# Patient Record
Sex: MALE | Race: WHITE | NOT HISPANIC OR LATINO | Employment: FULL TIME | URBAN - METROPOLITAN AREA
[De-identification: names, ages, dates, MRNs, and addresses within clinical notes are randomized per-mention and may not be internally consistent; named-entity substitution may affect disease eponyms.]

---

## 2023-03-17 LAB
PROSTATE SPECIFIC AG (NG/ML) IN SER/PLAS: <0.1 NG/ML (ref 0–4)
PROSTATE SPECIFIC AG FREE (NG/ML) IN SER/PLAS: <0.1 NG/ML
PROSTATE SPECIFIC AG FREE/PROSTATE SPECIFIC AG TOTAL IN SER/PLAS: NORMAL %

## 2023-03-20 LAB
TESTOSTERONE FREE (CHAN): 182.7 PG/ML (ref 30–135)
TESTOSTERONE,TOTAL,LC-MS/MS: 722 NG/DL (ref 250–1100)

## 2023-04-19 ENCOUNTER — APPOINTMENT (OUTPATIENT)
Dept: PRIMARY CARE | Facility: CLINIC | Age: 78
End: 2023-04-19
Payer: MEDICARE

## 2023-04-19 PROBLEM — E11.21 DIABETIC RENAL DISEASE (MULTI): Status: ACTIVE | Noted: 2023-04-19

## 2023-04-19 PROBLEM — C61 PROSTATE CANCER (MULTI): Status: ACTIVE | Noted: 2023-04-19

## 2023-04-19 PROBLEM — R60.0 LOCALIZED EDEMA: Status: ACTIVE | Noted: 2023-04-19

## 2023-04-19 PROBLEM — E29.1 HYPOGONADISM MALE: Status: ACTIVE | Noted: 2023-04-19

## 2023-04-19 PROBLEM — E78.00 ELEVATED LDL CHOLESTEROL LEVEL: Status: ACTIVE | Noted: 2023-04-19

## 2023-04-19 PROBLEM — D64.9 ANEMIA: Status: ACTIVE | Noted: 2023-04-19

## 2023-04-19 PROBLEM — E87.5 HYPERKALEMIA: Status: ACTIVE | Noted: 2023-04-19

## 2023-04-19 PROBLEM — M51.36 DEGENERATIVE DISC DISEASE, LUMBAR: Status: ACTIVE | Noted: 2023-04-19

## 2023-04-19 PROBLEM — R80.9 ALBUMINURIA: Status: ACTIVE | Noted: 2023-04-19

## 2023-04-19 PROBLEM — N18.4 BENIGN HYPERTENSION WITH CHRONIC KIDNEY DISEASE, STAGE IV (MULTI): Status: ACTIVE | Noted: 2023-04-19

## 2023-04-19 PROBLEM — M25.552 HIP PAIN, LEFT: Status: ACTIVE | Noted: 2023-04-19

## 2023-04-19 PROBLEM — M51.369 DEGENERATIVE DISC DISEASE, LUMBAR: Status: ACTIVE | Noted: 2023-04-19

## 2023-04-19 PROBLEM — I12.9 BENIGN HYPERTENSION WITH CHRONIC KIDNEY DISEASE, STAGE IV (MULTI): Status: ACTIVE | Noted: 2023-04-19

## 2023-04-19 PROBLEM — E29.1 TESTICULAR HYPOFUNCTION: Status: ACTIVE | Noted: 2023-04-19

## 2023-04-19 PROBLEM — D63.1 ANEMIA OF CHRONIC RENAL FAILURE: Status: ACTIVE | Noted: 2023-04-19

## 2023-04-19 PROBLEM — N18.9 ANEMIA OF CHRONIC RENAL FAILURE: Status: ACTIVE | Noted: 2023-04-19

## 2023-04-19 PROBLEM — Q98.4: Status: ACTIVE | Noted: 2023-04-19

## 2023-04-19 PROBLEM — N25.81 HYPERPARATHYROIDISM DUE TO RENAL INSUFFICIENCY (MULTI): Status: ACTIVE | Noted: 2023-04-19

## 2023-04-19 PROBLEM — E11.21 TYPE 2 DIABETES MELLITUS WITH NEPHROPATHY (MULTI): Status: ACTIVE | Noted: 2023-04-19

## 2023-04-19 PROBLEM — M25.569 KNEE PAIN: Status: ACTIVE | Noted: 2023-04-19

## 2023-04-19 PROBLEM — R60.9 DEPENDENT EDEMA: Status: ACTIVE | Noted: 2023-04-19

## 2023-04-19 PROBLEM — I87.2 VENOUS INSUFFICIENCY: Status: ACTIVE | Noted: 2023-04-19

## 2023-04-19 PROBLEM — N18.4 STAGE 4 CHRONIC KIDNEY DISEASE (MULTI): Status: ACTIVE | Noted: 2023-04-19

## 2023-04-19 PROBLEM — E11.9 DIABETES MELLITUS (MULTI): Status: ACTIVE | Noted: 2023-04-19

## 2023-04-19 PROBLEM — E55.9 VITAMIN D DEFICIENCY: Status: ACTIVE | Noted: 2023-04-19

## 2023-04-19 RX ORDER — TESTOSTERONE GEL, 1% 10 MG/G
GEL TRANSDERMAL
COMMUNITY
Start: 2023-04-03 | End: 2023-10-24 | Stop reason: WASHOUT

## 2023-04-19 RX ORDER — NAPROXEN SODIUM 220 MG/1
1 TABLET, FILM COATED ORAL DAILY
COMMUNITY

## 2023-04-19 RX ORDER — INSULIN LISPRO 100 [IU]/ML
6-16 INJECTION, SOLUTION INTRAVENOUS; SUBCUTANEOUS 3 TIMES DAILY
COMMUNITY
Start: 2022-06-01 | End: 2024-03-14 | Stop reason: SDUPTHER

## 2023-04-19 RX ORDER — TORSEMIDE 20 MG/1
1 TABLET ORAL DAILY
COMMUNITY

## 2023-04-19 RX ORDER — PRAVASTATIN SODIUM 20 MG/1
TABLET ORAL EVERY 24 HOURS
COMMUNITY
Start: 2014-01-31 | End: 2023-05-01 | Stop reason: SDUPTHER

## 2023-04-19 RX ORDER — INSULIN GLARGINE 100 [IU]/ML
65 INJECTION, SOLUTION SUBCUTANEOUS DAILY
COMMUNITY
Start: 2017-08-23 | End: 2023-10-24 | Stop reason: SDUPTHER

## 2023-04-19 RX ORDER — GLIPIZIDE 5 MG/1
TABLET ORAL EVERY 24 HOURS
COMMUNITY
Start: 2017-03-20 | End: 2023-09-07 | Stop reason: ALTCHOICE

## 2023-04-19 RX ORDER — METOPROLOL SUCCINATE 50 MG/1
TABLET, EXTENDED RELEASE ORAL EVERY 24 HOURS
COMMUNITY
Start: 2020-03-02 | End: 2023-05-05 | Stop reason: SDUPTHER

## 2023-04-19 RX ORDER — INSULIN LISPRO 100 [IU]/ML
INJECTION, SOLUTION INTRAVENOUS; SUBCUTANEOUS
COMMUNITY
Start: 2017-03-20

## 2023-04-19 RX ORDER — ACETAMINOPHEN 325 MG/1
TABLET ORAL
COMMUNITY

## 2023-04-19 RX ORDER — AMLODIPINE BESYLATE 10 MG/1
TABLET ORAL EVERY 24 HOURS
COMMUNITY
End: 2023-08-09 | Stop reason: SDUPTHER

## 2023-04-19 RX ORDER — BLOOD-GLUCOSE METER
KIT MISCELLANEOUS
COMMUNITY
Start: 2015-05-26 | End: 2023-10-24 | Stop reason: SDUPTHER

## 2023-04-19 RX ORDER — OMEGA-3 FATTY ACIDS/FISH OIL 340-1000MG
CAPSULE ORAL
COMMUNITY
End: 2023-10-24 | Stop reason: WASHOUT

## 2023-04-19 RX ORDER — ICOSAPENT ETHYL 1 G/1
CAPSULE ORAL
COMMUNITY
Start: 2021-09-26 | End: 2024-03-14 | Stop reason: ALTCHOICE

## 2023-04-19 RX ORDER — CALCITRIOL 0.25 UG/1
CAPSULE ORAL
COMMUNITY
Start: 2021-04-13 | End: 2024-03-14 | Stop reason: ALTCHOICE

## 2023-04-19 RX ORDER — DICLOFENAC SODIUM 10 MG/G
GEL TOPICAL
COMMUNITY
End: 2023-09-07 | Stop reason: ALTCHOICE

## 2023-05-01 ENCOUNTER — OFFICE VISIT (OUTPATIENT)
Dept: PRIMARY CARE | Facility: CLINIC | Age: 78
End: 2023-05-01
Payer: MEDICARE

## 2023-05-01 VITALS
SYSTOLIC BLOOD PRESSURE: 168 MMHG | WEIGHT: 247 LBS | BODY MASS INDEX: 40.48 KG/M2 | OXYGEN SATURATION: 93 % | HEART RATE: 68 BPM | DIASTOLIC BLOOD PRESSURE: 81 MMHG

## 2023-05-01 DIAGNOSIS — N18.4 STAGE 4 CHRONIC KIDNEY DISEASE (MULTI): ICD-10-CM

## 2023-05-01 DIAGNOSIS — C61 PROSTATE CANCER (MULTI): ICD-10-CM

## 2023-05-01 DIAGNOSIS — E78.00 ELEVATED LDL CHOLESTEROL LEVEL: ICD-10-CM

## 2023-05-01 DIAGNOSIS — Z23 NEED FOR TETANUS BOOSTER: ICD-10-CM

## 2023-05-01 DIAGNOSIS — N18.4 BENIGN HYPERTENSION WITH CHRONIC KIDNEY DISEASE, STAGE IV (MULTI): Primary | ICD-10-CM

## 2023-05-01 DIAGNOSIS — E11.21 TYPE 2 DIABETES MELLITUS WITH NEPHROPATHY (MULTI): ICD-10-CM

## 2023-05-01 DIAGNOSIS — Z00.00 ROUTINE GENERAL MEDICAL EXAMINATION AT HEALTH CARE FACILITY: ICD-10-CM

## 2023-05-01 DIAGNOSIS — N25.81 HYPERPARATHYROIDISM DUE TO RENAL INSUFFICIENCY (MULTI): ICD-10-CM

## 2023-05-01 DIAGNOSIS — E66.01 OBESITY, MORBID (MULTI): ICD-10-CM

## 2023-05-01 DIAGNOSIS — I12.9 BENIGN HYPERTENSION WITH CHRONIC KIDNEY DISEASE, STAGE IV (MULTI): Primary | ICD-10-CM

## 2023-05-01 PROCEDURE — 90471 IMMUNIZATION ADMIN: CPT | Performed by: INTERNAL MEDICINE

## 2023-05-01 PROCEDURE — 1159F MED LIST DOCD IN RCRD: CPT | Performed by: INTERNAL MEDICINE

## 2023-05-01 PROCEDURE — 99214 OFFICE O/P EST MOD 30 MIN: CPT | Performed by: INTERNAL MEDICINE

## 2023-05-01 PROCEDURE — 3079F DIAST BP 80-89 MM HG: CPT | Performed by: INTERNAL MEDICINE

## 2023-05-01 PROCEDURE — 1157F ADVNC CARE PLAN IN RCRD: CPT | Performed by: INTERNAL MEDICINE

## 2023-05-01 PROCEDURE — 1036F TOBACCO NON-USER: CPT | Performed by: INTERNAL MEDICINE

## 2023-05-01 PROCEDURE — 3077F SYST BP >= 140 MM HG: CPT | Performed by: INTERNAL MEDICINE

## 2023-05-01 PROCEDURE — 1160F RVW MEDS BY RX/DR IN RCRD: CPT | Performed by: INTERNAL MEDICINE

## 2023-05-01 PROCEDURE — 90715 TDAP VACCINE 7 YRS/> IM: CPT | Performed by: INTERNAL MEDICINE

## 2023-05-01 RX ORDER — PRAVASTATIN SODIUM 20 MG/1
20 TABLET ORAL EVERY 24 HOURS
Qty: 90 TABLET | Refills: 1 | Status: SHIPPED | OUTPATIENT
Start: 2023-05-01 | End: 2023-10-09

## 2023-05-01 RX ORDER — CALCITRIOL 0.25 UG/1
0.25 CAPSULE ORAL DAILY
Qty: 90 CAPSULE | Refills: 1 | Status: CANCELLED | OUTPATIENT
Start: 2023-05-01

## 2023-05-01 RX ORDER — HYDRALAZINE HYDROCHLORIDE 10 MG/1
10 TABLET, FILM COATED ORAL 3 TIMES DAILY
Qty: 270 TABLET | Refills: 1 | Status: SHIPPED | OUTPATIENT
Start: 2023-05-01 | End: 2023-09-07 | Stop reason: ALTCHOICE

## 2023-05-01 ASSESSMENT — PATIENT HEALTH QUESTIONNAIRE - PHQ9
2. FEELING DOWN, DEPRESSED OR HOPELESS: NOT AT ALL
1. LITTLE INTEREST OR PLEASURE IN DOING THINGS: NOT AT ALL
SUM OF ALL RESPONSES TO PHQ9 QUESTIONS 1 AND 2: 0

## 2023-05-01 NOTE — PATIENT INSTRUCTIONS
Start hydralazine three times daily , cont all other mes as is      [facility]: [phone]   [facility]: [phone]    Come back in 4 months

## 2023-05-01 NOTE — PROGRESS NOTES
Subjective   Reason for Visit: Michael Szymanski is an 77 y.o. male here for a Medicare Wellness visit.          Reviewed all medications by prescribing practitioner or clinical pharmacist (such as prescriptions, OTCs, herbal therapies and supplements) and documented in the medical record.    HPI    Patient Care Team:  Robinson Ackerman DO as PCP - General     Overall doing well   Bp is still high   Needs to have  colonoscopy completed   Needs Boy  paperwork completed     Review of Systems   All other systems reviewed and are negative.      Objective   Vitals:  /81   Pulse 68   Wt 112 kg (247 lb)   SpO2 93%   BMI 40.48 kg/m²       Physical Exam  Constitutional:       Appearance: Normal appearance.   HENT:      Head: Normocephalic and atraumatic.   Cardiovascular:      Rate and Rhythm: Normal rate and regular rhythm.      Heart sounds: Normal heart sounds. No murmur heard.     No gallop.   Pulmonary:      Effort: Pulmonary effort is normal. No respiratory distress.      Breath sounds: No wheezing or rales.   Skin:     General: Skin is warm and dry.      Findings: No rash.   Neurological:      Mental Status: He is alert and oriented to person, place, and time. Mental status is at baseline.   Psychiatric:         Mood and Affect: Mood normal.         Behavior: Behavior normal.         Assessment/Plan        #HTN  -High today, start hydralazine 10mg TID   -Cont amlodipine, metoprolol and torsemide     #HLD  -Cont pravastatin     #DM  -Follows with Dr. Brizuela. Cont glipizide, Lantus and SSI      #CKD stage 4  -Follows with Dr. Gonzalez. No ACE/ARB due to borderline hyperkalemia     #Klinefelter's syndrome, low testosterone, h/o prostate ca   -Follows with urology. cont testosterone supplementation      Colonoscopy: Ordered  PSA: 6/14/22     RTC 6 mo

## 2023-05-01 NOTE — PROGRESS NOTES
Subjective   Patient ID: Michael Szymanski is a 77 y.o. male who presents for follow up and forms sign    HPI     Review of Systems    Objective   /81   Pulse 68   Wt 112 kg (247 lb)   SpO2 93%   BMI 40.48 kg/m²     Physical Exam    Assessment/Plan

## 2023-05-05 DIAGNOSIS — I10 HYPERTENSION, UNSPECIFIED TYPE: ICD-10-CM

## 2023-05-05 RX ORDER — METOPROLOL SUCCINATE 50 MG/1
50 TABLET, EXTENDED RELEASE ORAL EVERY 24 HOURS
Qty: 180 TABLET | Refills: 1 | Status: SHIPPED | OUTPATIENT
Start: 2023-05-05 | End: 2024-05-09

## 2023-05-05 NOTE — TELEPHONE ENCOUNTER
Requested Prescriptions     Pending Prescriptions Disp Refills    metoprolol succinate XL (Toprol-XL) 50 mg 24 hr tablet 180 tablet 1     Sig: Take 1 tablet (50 mg) by mouth once every 24 hours.

## 2023-06-01 ENCOUNTER — APPOINTMENT (OUTPATIENT)
Dept: PRIMARY CARE | Facility: CLINIC | Age: 78
End: 2023-06-01
Payer: MEDICARE

## 2023-08-09 DIAGNOSIS — N18.4 BENIGN HYPERTENSION WITH CHRONIC KIDNEY DISEASE, STAGE IV (MULTI): ICD-10-CM

## 2023-08-09 DIAGNOSIS — I12.9 BENIGN HYPERTENSION WITH CHRONIC KIDNEY DISEASE, STAGE IV (MULTI): ICD-10-CM

## 2023-08-10 RX ORDER — AMLODIPINE BESYLATE 10 MG/1
10 TABLET ORAL EVERY 24 HOURS
Qty: 90 TABLET | Refills: 0 | Status: SHIPPED | OUTPATIENT
Start: 2023-08-10

## 2023-08-16 DIAGNOSIS — I12.9 BENIGN HYPERTENSION WITH CHRONIC KIDNEY DISEASE, STAGE IV (MULTI): ICD-10-CM

## 2023-08-16 DIAGNOSIS — N18.4 BENIGN HYPERTENSION WITH CHRONIC KIDNEY DISEASE, STAGE IV (MULTI): ICD-10-CM

## 2023-08-16 RX ORDER — AMLODIPINE BESYLATE 10 MG/1
10 TABLET ORAL EVERY 24 HOURS
Qty: 90 TABLET | Refills: 0 | Status: CANCELLED | OUTPATIENT
Start: 2023-08-16

## 2023-09-06 LAB
HEMATOCRIT (%) IN BLOOD BY AUTOMATED COUNT: 42 % (ref 41–52)
HEMOGLOBIN (G/DL) IN BLOOD: 12.9 G/DL (ref 13.5–17.5)

## 2023-09-06 NOTE — PROGRESS NOTES
Subjective   Patient ID: Michael Szymanski is a 77 y.o. male who presents for Medicare Annual Wellness Visit Subsequent.    HPI     Review of Systems    Objective   There were no vitals taken for this visit.    Physical Exam    Assessment/Plan        #HTN  -high today but improved. Cont amlodipine, metoprolol and torsemide  -he did not start hydralazine   -No ACE/ARB due to borderline hyperkalemia     #HLD  -Cont pravastatin, check lipids          #Anemia       -Likely due to CKD but will check iron, b12, folate and SPEP     #DM  -Follows with Dr. Brizuela. Cont glipizide, Lantus and SSI      #CKD stage 4  -Follows with Dr. Gonzalez. No ACE/ARB due to borderline hyperkalemia  -Encouraged to make follow up appt      #Klinefelter's syndrome, low testosterone, h/o prostate ca   -Follows with urology. cont testosterone supplementation      Colonoscopy: Ordered  PSA: 6/14/22     RTC 6 mo

## 2023-09-07 ENCOUNTER — OFFICE VISIT (OUTPATIENT)
Dept: PRIMARY CARE | Facility: CLINIC | Age: 78
End: 2023-09-07
Payer: MEDICARE

## 2023-09-07 VITALS
OXYGEN SATURATION: 93 % | BODY MASS INDEX: 38.09 KG/M2 | DIASTOLIC BLOOD PRESSURE: 78 MMHG | WEIGHT: 237 LBS | HEART RATE: 54 BPM | HEIGHT: 66 IN | SYSTOLIC BLOOD PRESSURE: 146 MMHG

## 2023-09-07 DIAGNOSIS — Z00.00 ROUTINE GENERAL MEDICAL EXAMINATION AT HEALTH CARE FACILITY: Primary | ICD-10-CM

## 2023-09-07 DIAGNOSIS — I12.9 BENIGN HYPERTENSION WITH CHRONIC KIDNEY DISEASE, STAGE IV (MULTI): ICD-10-CM

## 2023-09-07 DIAGNOSIS — E78.00 ELEVATED LDL CHOLESTEROL LEVEL: ICD-10-CM

## 2023-09-07 DIAGNOSIS — D64.9 ANEMIA, UNSPECIFIED TYPE: ICD-10-CM

## 2023-09-07 DIAGNOSIS — N18.4 BENIGN HYPERTENSION WITH CHRONIC KIDNEY DISEASE, STAGE IV (MULTI): ICD-10-CM

## 2023-09-07 LAB
PROSTATE SPECIFIC AG (NG/ML) IN SER/PLAS: <0.1 NG/ML (ref 0–4)
TESTOSTERONE (NG/DL) IN SER/PLAS: 1039 NG/DL (ref 240–1000)

## 2023-09-07 PROCEDURE — 1159F MED LIST DOCD IN RCRD: CPT | Performed by: INTERNAL MEDICINE

## 2023-09-07 PROCEDURE — 3077F SYST BP >= 140 MM HG: CPT | Performed by: INTERNAL MEDICINE

## 2023-09-07 PROCEDURE — G0439 PPPS, SUBSEQ VISIT: HCPCS | Performed by: INTERNAL MEDICINE

## 2023-09-07 PROCEDURE — 99214 OFFICE O/P EST MOD 30 MIN: CPT | Performed by: INTERNAL MEDICINE

## 2023-09-07 PROCEDURE — 1160F RVW MEDS BY RX/DR IN RCRD: CPT | Performed by: INTERNAL MEDICINE

## 2023-09-07 PROCEDURE — 3078F DIAST BP <80 MM HG: CPT | Performed by: INTERNAL MEDICINE

## 2023-09-07 PROCEDURE — 1036F TOBACCO NON-USER: CPT | Performed by: INTERNAL MEDICINE

## 2023-09-07 PROCEDURE — 1157F ADVNC CARE PLAN IN RCRD: CPT | Performed by: INTERNAL MEDICINE

## 2023-09-07 PROCEDURE — 1170F FXNL STATUS ASSESSED: CPT | Performed by: INTERNAL MEDICINE

## 2023-09-07 ASSESSMENT — ACTIVITIES OF DAILY LIVING (ADL)
GROCERY_SHOPPING: INDEPENDENT
MANAGING_FINANCES: INDEPENDENT
DRESSING: INDEPENDENT
TAKING_MEDICATION: INDEPENDENT
BATHING: INDEPENDENT
DOING_HOUSEWORK: INDEPENDENT

## 2023-09-07 ASSESSMENT — PATIENT HEALTH QUESTIONNAIRE - PHQ9
1. LITTLE INTEREST OR PLEASURE IN DOING THINGS: NOT AT ALL
2. FEELING DOWN, DEPRESSED OR HOPELESS: NOT AT ALL
SUM OF ALL RESPONSES TO PHQ9 QUESTIONS 1 AND 2: 0

## 2023-09-07 NOTE — PATIENT INSTRUCTIONS
Get fasting labs in the near future  Follow up with Dr Gonzalez   [phone]     [facility]: [phone]   [facility]: [phone]

## 2023-09-23 PROBLEM — E66.813 CLASS 3 SEVERE OBESITY WITH SERIOUS COMORBIDITY AND BODY MASS INDEX (BMI) OF 40.0 TO 44.9 IN ADULT: Status: ACTIVE | Noted: 2023-09-23

## 2023-09-23 PROBLEM — E66.01 CLASS 3 SEVERE OBESITY WITH SERIOUS COMORBIDITY AND BODY MASS INDEX (BMI) OF 40.0 TO 44.9 IN ADULT (MULTI): Status: ACTIVE | Noted: 2023-09-23

## 2023-09-23 PROBLEM — E66.813 CLASS 3 SEVERE OBESITY DUE TO EXCESS CALORIES WITH SERIOUS COMORBIDITY AND BODY MASS INDEX (BMI) OF 40.0 TO 44.9 IN ADULT: Status: ACTIVE | Noted: 2023-09-23

## 2023-09-23 PROBLEM — E66.01 CLASS 3 SEVERE OBESITY DUE TO EXCESS CALORIES WITH SERIOUS COMORBIDITY AND BODY MASS INDEX (BMI) OF 40.0 TO 44.9 IN ADULT (MULTI): Status: ACTIVE | Noted: 2023-09-23

## 2023-09-23 RX ORDER — AMLODIPINE BESYLATE 5 MG/1
5 TABLET ORAL DAILY
COMMUNITY
Start: 2023-05-09 | End: 2023-10-24 | Stop reason: WASHOUT

## 2023-09-23 RX ORDER — NAPROXEN SODIUM 220 MG/1
TABLET ORAL
COMMUNITY
Start: 2021-05-06

## 2023-09-23 RX ORDER — TESTOSTERONE GEL, 1% 10 MG/G
50 GEL TRANSDERMAL DAILY
COMMUNITY
End: 2024-02-28 | Stop reason: SDUPTHER

## 2023-09-23 RX ORDER — HYDRALAZINE HYDROCHLORIDE 10 MG/1
TABLET, FILM COATED ORAL
COMMUNITY
End: 2024-01-25 | Stop reason: WASHOUT

## 2023-10-08 DIAGNOSIS — E78.00 ELEVATED LDL CHOLESTEROL LEVEL: ICD-10-CM

## 2023-10-09 RX ORDER — PRAVASTATIN SODIUM 20 MG/1
TABLET ORAL
Qty: 90 TABLET | Refills: 2 | Status: SHIPPED | OUTPATIENT
Start: 2023-10-09 | End: 2024-06-06 | Stop reason: SDUPTHER

## 2023-10-09 NOTE — TELEPHONE ENCOUNTER
Requested Prescriptions     Pending Prescriptions Disp Refills    pravastatin (Pravachol) 20 mg tablet [Pharmacy Med Name: PRAVASTATIN SODIUM 20 MG TAB] 90 tablet 2     Sig: take 1 tablet by mouth ONCE every 24 hours

## 2023-10-24 ENCOUNTER — OFFICE VISIT (OUTPATIENT)
Dept: ENDOCRINOLOGY | Facility: CLINIC | Age: 78
End: 2023-10-24
Payer: MEDICARE

## 2023-10-24 VITALS — SYSTOLIC BLOOD PRESSURE: 137 MMHG | DIASTOLIC BLOOD PRESSURE: 64 MMHG | HEART RATE: 60 BPM

## 2023-10-24 DIAGNOSIS — E11.65 TYPE 2 DIABETES MELLITUS WITH HYPERGLYCEMIA, WITH LONG-TERM CURRENT USE OF INSULIN (MULTI): Primary | ICD-10-CM

## 2023-10-24 DIAGNOSIS — E11.21 TYPE 2 DIABETES MELLITUS WITH NEPHROPATHY (MULTI): ICD-10-CM

## 2023-10-24 DIAGNOSIS — Z79.4 TYPE 2 DIABETES MELLITUS WITH HYPERGLYCEMIA, WITH LONG-TERM CURRENT USE OF INSULIN (MULTI): Primary | ICD-10-CM

## 2023-10-24 LAB — POC HEMOGLOBIN A1C: 9.3 % (ref 4.2–6.5)

## 2023-10-24 PROCEDURE — 3075F SYST BP GE 130 - 139MM HG: CPT | Performed by: INTERNAL MEDICINE

## 2023-10-24 PROCEDURE — 1036F TOBACCO NON-USER: CPT | Performed by: INTERNAL MEDICINE

## 2023-10-24 PROCEDURE — 83036 HEMOGLOBIN GLYCOSYLATED A1C: CPT | Performed by: INTERNAL MEDICINE

## 2023-10-24 PROCEDURE — 99214 OFFICE O/P EST MOD 30 MIN: CPT | Performed by: INTERNAL MEDICINE

## 2023-10-24 PROCEDURE — 1160F RVW MEDS BY RX/DR IN RCRD: CPT | Performed by: INTERNAL MEDICINE

## 2023-10-24 PROCEDURE — 3078F DIAST BP <80 MM HG: CPT | Performed by: INTERNAL MEDICINE

## 2023-10-24 PROCEDURE — 1159F MED LIST DOCD IN RCRD: CPT | Performed by: INTERNAL MEDICINE

## 2023-10-24 RX ORDER — INSULIN GLARGINE 100 [IU]/ML
65 INJECTION, SOLUTION SUBCUTANEOUS NIGHTLY
Qty: 60 ML | Refills: 3 | Status: SHIPPED | OUTPATIENT
Start: 2023-10-24 | End: 2024-10-23

## 2023-10-24 RX ORDER — BLOOD-GLUCOSE METER
KIT MISCELLANEOUS
Qty: 300 STRIP | Refills: 3 | Status: SHIPPED | OUTPATIENT
Start: 2023-10-24

## 2023-10-24 ASSESSMENT — ENCOUNTER SYMPTOMS
ENDOCRINE COMMENTS: AS ABOVE
COUGH: 0
UNEXPECTED WEIGHT CHANGE: 0

## 2023-10-24 NOTE — LETTER
October 24, 2023     Robinson Ackerman DO  [address] [address]  [address]  [place] [address]    Patient: Michael Szymanski   YOB: 1945   Date of Visit: 10/24/2023       Dear Dr. Robinson Ackerman DO:    Thank you for referring Michael Szymanski to me for evaluation. Below are my notes for this consultation.  If you have questions, please do not hesitate to call me. I look forward to following your patient along with you.       Sincerely,     Tye Brizuela MD      CC: No Recipients  ______________________________________________________________________________________    History Of Present Illness  Michael Szymanski is a 78 y.o. male     Duration of type 2 diabetes mellitus:  25 years  Complications:  chronic kidney disease    Weight loss     Lantus 50-65 units at bedtime  Humalog 6 units before meals, add 2 units for every 50 over 101 mg/dl.       Patient is testing glucose 3 times daily  Records reviewed, on file    Last eye exam:      Past Medical History  He has a past medical history of Acute pansinusitis, unspecified (08/06/2019), Benign prostatic hyperplasia without lower urinary tract symptoms, Cellulitis of left lower limb (10/08/2021), Encounter for immunization (03/21/2017), Hyperlipidemia, unspecified (12/10/2015), Hypertensive chronic kidney disease with stage 1 through stage 4 chronic kidney disease, or unspecified chronic kidney disease (12/09/2021), Klinefelter syndrome, unspecified, Localized edema (09/29/2021), Low back pain, unspecified (04/25/2018), Morbid (severe) obesity due to excess calories (CMS/HCC) (05/14/2020), Morbid (severe) obesity due to excess calories (CMS/HCC) (12/11/2021), Obesity, unspecified (08/07/2019), Other abnormal glucose, Other conditions influencing health status (03/12/2022), Other conditions influencing health status (03/12/2022), Other specified abnormal findings of blood chemistry (02/13/2020), Other specified symptoms and signs involving the circulatory and respiratory  systems (12/31/2018), Other symptoms and signs involving the musculoskeletal system (04/25/2018), Other tear of medial meniscus, current injury, unspecified knee, initial encounter, Personal history of diseases of the skin and subcutaneous tissue (08/07/2019), Personal history of other diseases of male genital organs, Personal history of other diseases of the circulatory system, Personal history of other diseases of the musculoskeletal system and connective tissue, Personal history of other diseases of the musculoskeletal system and connective tissue, Personal history of other diseases of the nervous system and sense organs, Personal history of other diseases of the respiratory system (06/25/2018), Personal history of other diseases of the respiratory system, Personal history of other diseases of urinary system (04/19/2017), Personal history of other endocrine, nutritional and metabolic disease, Personal history of other endocrine, nutritional and metabolic disease, Personal history of other endocrine, nutritional and metabolic disease, Personal history of other medical treatment, Personal history of other specified conditions (05/14/2020), Personal history of other specified conditions (10/21/2021), Personal history of other specified conditions (06/19/2013), Personal history of urinary (tract) infections (01/13/2022), Proteinuria, unspecified, Spondylosis without myelopathy or radiculopathy, cervical region, Sprain of ribs, initial encounter (09/09/2021), Sprain of ribs, initial encounter (09/09/2021), Strain of muscle, fascia and tendon of lower back, subsequent encounter (09/10/2019), Type 2 diabetes mellitus with mild nonproliferative diabetic retinopathy without macular edema, bilateral (CMS/[facility]) (12/09/2021), and 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    Surgical History  He has a past surgical history that includes Tonsillectomy  (05/29/2013); Gallbladder surgery (06/19/2013); Other surgical history (06/19/2013); Cataract extraction (06/19/2013); Colonoscopy (02/06/2017); Knee surgery (02/06/2017); Esophagogastroduodenoscopy (02/06/2017); Cystoscopy (02/06/2017); Cholecystectomy (02/06/2017); and Knee surgery (02/06/2017).     Social History  He reports that he has never smoked. He has never used smokeless tobacco. No history on file for alcohol use and drug use.    Family History  Family History   Problem Relation Name Age of Onset   • Pancreatic cancer Mother     • Diabetes Mother     • Heart disease Mother     • Kidney disease Father     • Hearing loss Brother     • Other (elevated psa) Brother          serum   • Other (cardiac disorder) Maternal Grandmother     • Other (cardiac disorder) Maternal Grandfather     • Other (cardiac disorder) Paternal Grandmother     • Other (cardiac disorder) Paternal Grandfather         Allergies  Cortisone and Penicillins    Review of Systems   Constitutional:  Negative for unexpected weight change (weight loss).   Respiratory:  Negative for cough.    Endocrine:        As above         Last Recorded Vitals  Blood pressure 137/64, pulse 60.    Physical Exam  Constitutional:       General: He is not in acute distress.     Appearance: He is obese.   HENT:      Head: Normocephalic.   Eyes:      Extraocular Movements: Extraocular movements intact.   Neck:      Thyroid: No thyromegaly.   Cardiovascular:      Pulses:           Radial pulses are 2+ on the right side and 2+ on the left side.   Feet:      Comments: Compression stockings  Neurological:      Mental Status: He is alert.      Motor: No tremor.   Psychiatric:         Mood and Affect: Affect normal.            IMPRESSION  TYPE 2 DIABETES MELLITUS  LONG TERM CURRENT INSULIN USE  Rapid A1c 9.3%  Rising A1c  Fasting hyperglycemia      RECOMMENDATIONS  Lantus 65 units every night.    Follow up 3 months

## 2023-10-24 NOTE — PROGRESS NOTES
History Of Present Illness  Michael Szymanski is a 78 y.o. male     Duration of type 2 diabetes mellitus:  25 years  Complications:  chronic kidney disease    Weight loss     Lantus 50-65 units at bedtime  Humalog 6 units before meals, add 2 units for every 50 over 101 mg/dl.       Patient is testing glucose 3 times daily  Records reviewed, on file    Last eye exam:      Past Medical History  He has a past medical history of Acute pansinusitis, unspecified (08/06/2019), Benign prostatic hyperplasia without lower urinary tract symptoms, Cellulitis of left lower limb (10/08/2021), Encounter for immunization (03/21/2017), Hyperlipidemia, unspecified (12/10/2015), Hypertensive chronic kidney disease with stage 1 through stage 4 chronic kidney disease, or unspecified chronic kidney disease (12/09/2021), Klinefelter syndrome, unspecified, Localized edema (09/29/2021), Low back pain, unspecified (04/25/2018), Morbid (severe) obesity due to excess calories (CMS/[facility]) (05/14/2020), Morbid (severe) obesity due to excess calories (CMS/[facility]) (12/11/2021), Obesity, unspecified (08/07/2019), Other abnormal glucose, Other conditions influencing health status (03/12/2022), Other conditions influencing health status (03/12/2022), Other specified abnormal findings of blood chemistry (02/13/2020), Other specified symptoms and signs involving the circulatory and respiratory systems (12/31/2018), Other symptoms and signs involving the musculoskeletal system (04/25/2018), Other tear of medial meniscus, current injury, unspecified knee, initial encounter, Personal history of diseases of the skin and subcutaneous tissue (08/07/2019), Personal history of other diseases of male genital organs, Personal history of other diseases of the circulatory system, Personal history of other diseases of the musculoskeletal system and connective tissue, Personal history of other diseases of the musculoskeletal system and connective tissue, Personal history of  other diseases of the nervous system and sense organs, Personal history of other diseases of the respiratory system (06/25/2018), Personal history of other diseases of the respiratory system, Personal history of other diseases of urinary system (04/19/2017), Personal history of other endocrine, nutritional and metabolic disease, Personal history of other endocrine, nutritional and metabolic disease, Personal history of other endocrine, nutritional and metabolic disease, Personal history of other medical treatment, Personal history of other specified conditions (05/14/2020), Personal history of other specified conditions (10/21/2021), Personal history of other specified conditions (06/19/2013), Personal history of urinary (tract) infections (01/13/2022), Proteinuria, unspecified, Spondylosis without myelopathy or radiculopathy, cervical region, Sprain of ribs, initial encounter (09/09/2021), Sprain of ribs, initial encounter (09/09/2021), Strain of muscle, fascia and tendon of lower back, subsequent encounter (09/10/2019), Type 2 diabetes mellitus with mild nonproliferative diabetic retinopathy without macular edema, bilateral (CMS/[facility]) (12/09/2021), and 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    Surgical History  He has a past surgical history that includes Tonsillectomy (05/29/2013); Gallbladder surgery (06/19/2013); Other surgical history (06/19/2013); Cataract extraction (06/19/2013); Colonoscopy (02/06/2017); Knee surgery (02/06/2017); Esophagogastroduodenoscopy (02/06/2017); Cystoscopy (02/06/2017); Cholecystectomy (02/06/2017); and Knee surgery (02/06/2017).     Social History  He reports that he has never smoked. He has never used smokeless tobacco. No history on file for alcohol use and drug use.    Family History  Family History   Problem Relation Name Age of Onset    Pancreatic cancer Mother      Diabetes Mother      Heart disease Mother       Kidney disease Father      Hearing loss Brother      Other (elevated psa) Brother          serum    Other (cardiac disorder) Maternal Grandmother      Other (cardiac disorder) Maternal Grandfather      Other (cardiac disorder) Paternal Grandmother      Other (cardiac disorder) Paternal Grandfather         Allergies  Cortisone and Penicillins    Review of Systems   Constitutional:  Negative for unexpected weight change (weight loss).   Respiratory:  Negative for cough.    Endocrine:        As above         Last Recorded Vitals  Blood pressure 137/64, pulse 60.    Physical Exam  Constitutional:       General: He is not in acute distress.     Appearance: He is obese.   HENT:      Head: Normocephalic.   Eyes:      Extraocular Movements: Extraocular movements intact.   Neck:      Thyroid: No thyromegaly.   Cardiovascular:      Pulses:           Radial pulses are 2+ on the right side and 2+ on the left side.   Feet:      Comments: Compression stockings  Neurological:      Mental Status: He is alert.      Motor: No tremor.   Psychiatric:         Mood and Affect: Affect normal.            IMPRESSION  TYPE 2 DIABETES MELLITUS  LONG TERM CURRENT INSULIN USE  Rapid A1c 9.3%  Rising A1c  Fasting hyperglycemia      RECOMMENDATIONS  Lantus 65 units every night.    Follow up 3 months

## 2023-12-18 ENCOUNTER — LAB (OUTPATIENT)
Dept: LAB | Facility: LAB | Age: 78
End: 2023-12-18
Payer: MEDICARE

## 2023-12-18 DIAGNOSIS — N18.4 CHRONIC KIDNEY DISEASE, STAGE 4 (SEVERE) (MULTI): Primary | ICD-10-CM

## 2023-12-18 LAB
ALBUMIN SERPL BCP-MCNC: 3.7 G/DL (ref 3.4–5)
ANION GAP SERPL CALC-SCNC: 16 MMOL/L (ref 10–20)
BUN SERPL-MCNC: 38 MG/DL (ref 6–23)
CALCIUM SERPL-MCNC: 8.8 MG/DL (ref 8.6–10.3)
CHLORIDE SERPL-SCNC: 102 MMOL/L (ref 98–107)
CO2 SERPL-SCNC: 26 MMOL/L (ref 21–32)
CREAT SERPL-MCNC: 3.03 MG/DL (ref 0.5–1.3)
ERYTHROCYTE [DISTWIDTH] IN BLOOD BY AUTOMATED COUNT: 12.2 % (ref 11.5–14.5)
GFR SERPL CREATININE-BSD FRML MDRD: 21 ML/MIN/1.73M*2
GLUCOSE SERPL-MCNC: 160 MG/DL (ref 74–99)
HCT VFR BLD AUTO: 43.7 % (ref 41–52)
HGB BLD-MCNC: 13.7 G/DL (ref 13.5–17.5)
MCH RBC QN AUTO: 30.6 PG (ref 26–34)
MCHC RBC AUTO-ENTMCNC: 31.4 G/DL (ref 32–36)
MCV RBC AUTO: 98 FL (ref 80–100)
NRBC BLD-RTO: 0 /100 WBCS (ref 0–0)
PHOSPHATE SERPL-MCNC: 3.2 MG/DL (ref 2.5–4.9)
PLATELET # BLD AUTO: 224 X10*3/UL (ref 150–450)
POTASSIUM SERPL-SCNC: 4.8 MMOL/L (ref 3.5–5.3)
RBC # BLD AUTO: 4.48 X10*6/UL (ref 4.5–5.9)
SODIUM SERPL-SCNC: 139 MMOL/L (ref 136–145)
WBC # BLD AUTO: 6.8 X10*3/UL (ref 4.4–11.3)

## 2023-12-18 PROCEDURE — 36415 COLL VENOUS BLD VENIPUNCTURE: CPT

## 2023-12-18 PROCEDURE — 85027 COMPLETE CBC AUTOMATED: CPT

## 2023-12-18 PROCEDURE — 83970 ASSAY OF PARATHORMONE: CPT

## 2023-12-18 PROCEDURE — 80069 RENAL FUNCTION PANEL: CPT

## 2023-12-19 LAB — PTH-INTACT SERPL-MCNC: 100.6 PG/ML (ref 18.5–88)

## 2024-01-25 ENCOUNTER — OFFICE VISIT (OUTPATIENT)
Dept: ENDOCRINOLOGY | Facility: CLINIC | Age: 79
End: 2024-01-25
Payer: MEDICARE

## 2024-01-25 VITALS — DIASTOLIC BLOOD PRESSURE: 83 MMHG | SYSTOLIC BLOOD PRESSURE: 136 MMHG | HEART RATE: 56 BPM

## 2024-01-25 DIAGNOSIS — Z79.4 TYPE 2 DIABETES MELLITUS WITH HYPERGLYCEMIA, WITH LONG-TERM CURRENT USE OF INSULIN (MULTI): Primary | ICD-10-CM

## 2024-01-25 DIAGNOSIS — E11.65 TYPE 2 DIABETES MELLITUS WITH HYPERGLYCEMIA, WITH LONG-TERM CURRENT USE OF INSULIN (MULTI): Primary | ICD-10-CM

## 2024-01-25 LAB — POC HEMOGLOBIN A1C: 9.7 % (ref 4.2–6.5)

## 2024-01-25 PROCEDURE — 83036 HEMOGLOBIN GLYCOSYLATED A1C: CPT | Performed by: INTERNAL MEDICINE

## 2024-01-25 PROCEDURE — 1159F MED LIST DOCD IN RCRD: CPT | Performed by: INTERNAL MEDICINE

## 2024-01-25 PROCEDURE — 3079F DIAST BP 80-89 MM HG: CPT | Performed by: INTERNAL MEDICINE

## 2024-01-25 PROCEDURE — 99214 OFFICE O/P EST MOD 30 MIN: CPT | Performed by: INTERNAL MEDICINE

## 2024-01-25 PROCEDURE — 3075F SYST BP GE 130 - 139MM HG: CPT | Performed by: INTERNAL MEDICINE

## 2024-01-25 PROCEDURE — 1036F TOBACCO NON-USER: CPT | Performed by: INTERNAL MEDICINE

## 2024-01-25 PROCEDURE — 1160F RVW MEDS BY RX/DR IN RCRD: CPT | Performed by: INTERNAL MEDICINE

## 2024-01-25 PROCEDURE — 1157F ADVNC CARE PLAN IN RCRD: CPT | Performed by: INTERNAL MEDICINE

## 2024-01-25 RX ORDER — BLOOD-GLUCOSE,RECEIVER,CONT
EACH MISCELLANEOUS
Qty: 1 EACH | Refills: 0 | Status: SHIPPED | OUTPATIENT
Start: 2024-01-25

## 2024-01-25 RX ORDER — BLOOD-GLUCOSE SENSOR
EACH MISCELLANEOUS
Qty: 9 EACH | Refills: 3 | Status: SHIPPED | OUTPATIENT
Start: 2024-01-25

## 2024-01-25 NOTE — LETTER
February 4, 2024     Robinson Ackerman DO  [address] [address]  [address]  [place] [facility] [address]    Patient: Michael Szymanski   YOB: 1945   Date of Visit: 1/25/2024       Dear Dr. Robinson Ackerman DO:    Thank you for referring Michael Szymanski to me for evaluation. Below are my notes for this consultation.  If you have questions, please do not hesitate to call me. I look forward to following your patient along with you.       Sincerely,     Tye Brizuela MD      CC: No Recipients  ______________________________________________________________________________________    History Of Present Illness  Michael Szymanski is a 78 y.o. male     Duration of type 2 diabetes mellitus:  25 years  Complications:  chronic kidney disease     Weight stabilized, last weight 236 lbs at home    Lantus 50-65 units at bedtime  Humalog 6 units before meals, add 2 units for every 50 over 101 mg/dl.   Patient is testing glucose 2 times daily  Records reviewed, on file    Last eye exam:  2023    Past Medical History  He has a past medical history of Acute pansinusitis, unspecified (08/06/2019), Benign prostatic hyperplasia without lower urinary tract symptoms, Cellulitis of left lower limb (10/08/2021), Encounter for immunization (03/21/2017), Hyperlipidemia, unspecified (12/10/2015), Hypertensive chronic kidney disease with stage 1 through stage 4 chronic kidney disease, or unspecified chronic kidney disease (12/09/2021), Klinefelter syndrome, unspecified, Localized edema (09/29/2021), Low back pain, unspecified (04/25/2018), Morbid (severe) obesity due to excess calories (CMS/HCC) (05/14/2020), Morbid (severe) obesity due to excess calories (CMS/HCC) (12/11/2021), Obesity, unspecified (08/07/2019), Other abnormal glucose, Other conditions influencing health status (03/12/2022), Other conditions influencing health status (03/12/2022), Other specified abnormal findings of blood chemistry (02/13/2020), Other specified symptoms and signs involving  the circulatory and respiratory systems (12/31/2018), Other symptoms and signs involving the musculoskeletal system (04/25/2018), Other tear of medial meniscus, current injury, unspecified knee, initial encounter, Personal history of diseases of the skin and subcutaneous tissue (08/07/2019), Personal history of other diseases of male genital organs, Personal history of other diseases of the circulatory system, Personal history of other diseases of the musculoskeletal system and connective tissue, Personal history of other diseases of the musculoskeletal system and connective tissue, Personal history of other diseases of the nervous system and sense organs, Personal history of other diseases of the respiratory system (06/25/2018), Personal history of other diseases of the respiratory system, Personal history of other diseases of urinary system (04/19/2017), Personal history of other endocrine, nutritional and metabolic disease, Personal history of other endocrine, nutritional and metabolic disease, Personal history of other endocrine, nutritional and metabolic disease, Personal history of other medical treatment, Personal history of other specified conditions (05/14/2020), Personal history of other specified conditions (10/21/2021), Personal history of other specified conditions (06/19/2013), Personal history of urinary (tract) infections (01/13/2022), Proteinuria, unspecified, Spondylosis without myelopathy or radiculopathy, cervical region, Sprain of ribs, initial encounter (09/09/2021), Sprain of ribs, initial encounter (09/09/2021), Strain of muscle, fascia and tendon of lower back, subsequent encounter (09/10/2019), Type 2 diabetes mellitus with mild nonproliferative diabetic retinopathy without macular edema, bilateral (CMS/[facility]) (12/09/2021), and 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    Surgical History  He has a past surgical history that  includes Tonsillectomy (05/29/2013); Gallbladder surgery (06/19/2013); Other surgical history (06/19/2013); Cataract extraction (06/19/2013); Colonoscopy (02/06/2017); Knee surgery (02/06/2017); Esophagogastroduodenoscopy (02/06/2017); Cystoscopy (02/06/2017); Cholecystectomy (02/06/2017); and Knee surgery (02/06/2017).     Social History  He reports that he has never smoked. He has never used smokeless tobacco. No history on file for alcohol use and drug use.    Family History  Family History   Problem Relation Name Age of Onset   • Pancreatic cancer Mother     • Diabetes Mother     • Heart disease Mother     • Kidney disease Father     • Hearing loss Brother     • Other (elevated psa) Brother          serum   • Other (cardiac disorder) Maternal Grandmother     • Other (cardiac disorder) Maternal Grandfather     • Other (cardiac disorder) Paternal Grandmother     • Other (cardiac disorder) Paternal Grandfather         Medications  Current Outpatient Medications   Medication Instructions   • acetaminophen (Tylenol) 325 mg tablet 1 tablet as needed   • amLODIPine (NORVASC) 10 mg, oral, Every 24 hours   • aspirin 81 mg chewable tablet 1 tablet, oral, Daily   • calcitriol (Rocaltrol) 0.25 mcg capsule oral   • FreeStyle Lite Strips strip For glucose testing 3 times daily   • HumaLOG U-100 Insulin 100 unit/mL injection 0.06-0.16 mL (6-16 Units) 3 times a day.   • icosapent ethyL (Vascepa) 1 gram capsule oral   • insulin lispro (HumaLOG KwikPen Insulin) 100 unit/mL injection as directed   • Lantus U-100 Insulin 65 Units, subcutaneous, Nightly   • metoprolol succinate XL (TOPROL-XL) 50 mg, oral, Every 24 hours   • omega 3-dha-epa-fish oil (Fish OiL) 1,200 (144-216) mg capsule Fish Oil 1200 MG Oral Capsule  Refills: 0  Start: 6-May-2021   • pen needle, diabetic (NOVOFINE 32 MISC) miscellaneous, 2-3 times a day   • pravastatin (Pravachol) 20 mg tablet take 1 tablet by mouth ONCE every 24 hours   • testosterone (ANDROGEL) 50  mg, transdermal, Daily   • torsemide (Demadex) 20 mg tablet 1 tablet, oral, Daily       Allergies  Cortisone and Penicillins    Review of Systems   Constitutional:  Negative for unexpected weight change.   Respiratory:  Negative for cough.    Gastrointestinal:  Negative for diarrhea, nausea and vomiting.   Endocrine:        As above         Last Recorded Vitals  Blood pressure 136/83, pulse 56.    Physical Exam  Constitutional:       General: He is not in acute distress.     Appearance: He is obese.   HENT:      Head: Normocephalic.   Eyes:      Extraocular Movements: Extraocular movements intact.   Neck:      Thyroid: No thyromegaly.   Cardiovascular:      Pulses:           Radial pulses are 2+ on the right side and 2+ on the left side. Neurological:      Mental Status: He is alert.      Motor: No tremor.   Psychiatric:         Mood and Affect: Affect normal.         Relevant Results  Glucose (mg/dL)   Date Value   12/18/2023 160 (H)   01/03/2023 141 (H)   06/20/2022 140 (H)   12/14/2021 187 (H)     POC HEMOGLOBIN A1c (%)   Date Value   10/24/2023 9.3 (A)     Hemoglobin A1C (%)   Date Value   09/24/2021 8.5 (A)   07/08/2020 9.0     Bicarbonate (mmol/L)   Date Value   12/18/2023 26   01/03/2023 29   06/20/2022 21   12/14/2021 27     Urea Nitrogen (mg/dL)   Date Value   12/18/2023 38 (H)   01/03/2023 44 (H)   06/20/2022 67 (H)   12/14/2021 48 (H)     Creatinine (mg/dL)   Date Value   12/18/2023 3.03 (H)   01/03/2023 2.88 (H)   06/20/2022 3.11 (H)   12/14/2021 2.80 (H)     Lab Results   Component Value Date    CHOL 161 01/03/2023    CHOL 171 06/08/2021    CHOL 173 02/28/2020     Lab Results   Component Value Date    HDL 40.8 01/03/2023    HDL 34.9 (A) 06/08/2021    HDL 40.5 02/28/2020     Lab Results   Component Value Date    TRIG 160 (H) 01/03/2023    TRIG 315 (H) 06/08/2021    TRIG 241 (H) 02/28/2020     Lab Results   Component Value Date    TSH 1.25 05/20/2019       IMPRESSION  TYPE 2 DIABETES MELLITUS  LONG TERM  CURRENT INSULIN USE  Rapid A1c 9.7%  No improvement in A1c      RECOMMENDATIONS  Increase Lantus to 68 units at bedtime    Recommend DexCom G7  Bring DexCom  to all appointments.    Follow up 3 months  Labs as ordered by Cynthia Ackerman and Lisa.

## 2024-01-25 NOTE — PATIENT INSTRUCTIONS
RECOMMENDATIONS  Increase Lantus to 68 units at bedtime    Recommend DexCom G7  Bring DexCom  to all appointments.    Follow up 3 months  Labs as ordered by Cynthia Ackerman and Lisa.

## 2024-01-25 NOTE — PROGRESS NOTES
History Of Present Illness  Michael Szymanski is a 78 y.o. male     Duration of type 2 diabetes mellitus:  25 years  Complications:  chronic kidney disease     Weight stabilized, last weight 236 lbs at home    Lantus 50-65 units at bedtime  Humalog 6 units before meals, add 2 units for every 50 over 101 mg/dl.   Patient is testing glucose 2 times daily  Records reviewed, on file    Last eye exam:  2023    Past Medical History  He has a past medical history of Acute pansinusitis, unspecified (08/06/2019), Benign prostatic hyperplasia without lower urinary tract symptoms, Cellulitis of left lower limb (10/08/2021), Encounter for immunization (03/21/2017), Hyperlipidemia, unspecified (12/10/2015), Hypertensive chronic kidney disease with stage 1 through stage 4 chronic kidney disease, or unspecified chronic kidney disease (12/09/2021), Klinefelter syndrome, unspecified, Localized edema (09/29/2021), Low back pain, unspecified (04/25/2018), Morbid (severe) obesity due to excess calories (CMS/HCC) (05/14/2020), Morbid (severe) obesity due to excess calories (CMS/HCC) (12/11/2021), Obesity, unspecified (08/07/2019), Other abnormal glucose, Other conditions influencing health status (03/12/2022), Other conditions influencing health status (03/12/2022), Other specified abnormal findings of blood chemistry (02/13/2020), Other specified symptoms and signs involving the circulatory and respiratory systems (12/31/2018), Other symptoms and signs involving the musculoskeletal system (04/25/2018), Other tear of medial meniscus, current injury, unspecified knee, initial encounter, Personal history of diseases of the skin and subcutaneous tissue (08/07/2019), Personal history of other diseases of male genital organs, Personal history of other diseases of the circulatory system, Personal history of other diseases of the musculoskeletal system and connective tissue, Personal history of other diseases of the musculoskeletal system and  connective tissue, Personal history of other diseases of the nervous system and sense organs, Personal history of other diseases of the respiratory system (06/25/2018), Personal history of other diseases of the respiratory system, Personal history of other diseases of urinary system (04/19/2017), Personal history of other endocrine, nutritional and metabolic disease, Personal history of other endocrine, nutritional and metabolic disease, Personal history of other endocrine, nutritional and metabolic disease, Personal history of other medical treatment, Personal history of other specified conditions (05/14/2020), Personal history of other specified conditions (10/21/2021), Personal history of other specified conditions (06/19/2013), Personal history of urinary (tract) infections (01/13/2022), Proteinuria, unspecified, Spondylosis without myelopathy or radiculopathy, cervical region, Sprain of ribs, initial encounter (09/09/2021), Sprain of ribs, initial encounter (09/09/2021), Strain of muscle, fascia and tendon of lower back, subsequent encounter (09/10/2019), Type 2 diabetes mellitus with mild nonproliferative diabetic retinopathy without macular edema, bilateral (CMS/[facility]) (12/09/2021), and 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    Surgical History  He has a past surgical history that includes Tonsillectomy (05/29/2013); Gallbladder surgery (06/19/2013); Other surgical history (06/19/2013); Cataract extraction (06/19/2013); Colonoscopy (02/06/2017); Knee surgery (02/06/2017); Esophagogastroduodenoscopy (02/06/2017); Cystoscopy (02/06/2017); Cholecystectomy (02/06/2017); and Knee surgery (02/06/2017).     Social History  He reports that he has never smoked. He has never used smokeless tobacco. No history on file for alcohol use and drug use.    Family History  Family History   Problem Relation Name Age of Onset    Pancreatic cancer Mother       Diabetes Mother      Heart disease Mother      Kidney disease Father      Hearing loss Brother      Other (elevated psa) Brother          serum    Other (cardiac disorder) Maternal Grandmother      Other (cardiac disorder) Maternal Grandfather      Other (cardiac disorder) Paternal Grandmother      Other (cardiac disorder) Paternal Grandfather         Medications  Current Outpatient Medications   Medication Instructions    acetaminophen (Tylenol) 325 mg tablet 1 tablet as needed    amLODIPine (NORVASC) 10 mg, oral, Every 24 hours    aspirin 81 mg chewable tablet 1 tablet, oral, Daily    calcitriol (Rocaltrol) 0.25 mcg capsule oral    FreeStyle Lite Strips strip For glucose testing 3 times daily    HumaLOG U-100 Insulin 100 unit/mL injection 0.06-0.16 mL (6-16 Units) 3 times a day.    icosapent ethyL (Vascepa) 1 gram capsule oral    insulin lispro (HumaLOG KwikPen Insulin) 100 unit/mL injection as directed    Lantus U-100 Insulin 65 Units, subcutaneous, Nightly    metoprolol succinate XL (TOPROL-XL) 50 mg, oral, Every 24 hours    omega 3-dha-epa-fish oil (Fish OiL) 1,200 (144-216) mg capsule Fish Oil 1200 MG Oral Capsule  Refills: 0  Start: 6-May-2021    pen needle, diabetic (NOVOFINE 32 MISC) miscellaneous, 2-3 times a day    pravastatin (Pravachol) 20 mg tablet take 1 tablet by mouth ONCE every 24 hours    testosterone (ANDROGEL) 50 mg, transdermal, Daily    torsemide (Demadex) 20 mg tablet 1 tablet, oral, Daily       Allergies  Cortisone and Penicillins    Review of Systems   Constitutional:  Negative for unexpected weight change.   Respiratory:  Negative for cough.    Gastrointestinal:  Negative for diarrhea, nausea and vomiting.   Endocrine:        As above         Last Recorded Vitals  Blood pressure 136/83, pulse 56.    Physical Exam  Constitutional:       General: He is not in acute distress.     Appearance: He is obese.   HENT:      Head: Normocephalic.   Eyes:      Extraocular Movements: Extraocular  movements intact.   Neck:      Thyroid: No thyromegaly.   Cardiovascular:      Pulses:           Radial pulses are 2+ on the right side and 2+ on the left side. Neurological:      Mental Status: He is alert.      Motor: No tremor.   Psychiatric:         Mood and Affect: Affect normal.         Relevant Results  Glucose (mg/dL)   Date Value   12/18/2023 160 (H)   01/03/2023 141 (H)   06/20/2022 140 (H)   12/14/2021 187 (H)     POC HEMOGLOBIN A1c (%)   Date Value   10/24/2023 9.3 (A)     Hemoglobin A1C (%)   Date Value   09/24/2021 8.5 (A)   07/08/2020 9.0     Bicarbonate (mmol/L)   Date Value   12/18/2023 26   01/03/2023 29   06/20/2022 21   12/14/2021 27     Urea Nitrogen (mg/dL)   Date Value   12/18/2023 38 (H)   01/03/2023 44 (H)   06/20/2022 67 (H)   12/14/2021 48 (H)     Creatinine (mg/dL)   Date Value   12/18/2023 3.03 (H)   01/03/2023 2.88 (H)   06/20/2022 3.11 (H)   12/14/2021 2.80 (H)     Lab Results   Component Value Date    CHOL 161 01/03/2023    CHOL 171 06/08/2021    CHOL 173 02/28/2020     Lab Results   Component Value Date    HDL 40.8 01/03/2023    HDL 34.9 (A) 06/08/2021    HDL 40.5 02/28/2020     Lab Results   Component Value Date    TRIG 160 (H) 01/03/2023    TRIG 315 (H) 06/08/2021    TRIG 241 (H) 02/28/2020     Lab Results   Component Value Date    TSH 1.25 05/20/2019       IMPRESSION  TYPE 2 DIABETES MELLITUS  LONG TERM CURRENT INSULIN USE  Rapid A1c 9.7%  No improvement in A1c      RECOMMENDATIONS  Increase Lantus to 68 units at bedtime    Recommend DexCom G7  Bring DexCom  to all appointments.    Follow up 3 months  Labs as ordered by Cynthia Ackerman and Lisa.

## 2024-02-04 ASSESSMENT — ENCOUNTER SYMPTOMS
NAUSEA: 0
ENDOCRINE COMMENTS: AS ABOVE
VOMITING: 0
DIARRHEA: 0
COUGH: 0
UNEXPECTED WEIGHT CHANGE: 0

## 2024-02-26 ENCOUNTER — LAB (OUTPATIENT)
Dept: LAB | Facility: LAB | Age: 79
End: 2024-02-26
Payer: MEDICARE

## 2024-02-26 DIAGNOSIS — E29.1 HYPOGONADISM IN MALE: ICD-10-CM

## 2024-02-26 DIAGNOSIS — R80.9 ALBUMINURIA: ICD-10-CM

## 2024-02-26 DIAGNOSIS — C61 MALIGNANT NEOPLASM OF PROSTATE (MULTI): ICD-10-CM

## 2024-02-26 DIAGNOSIS — E29.1 HYPOGONADISM IN MALE: Primary | ICD-10-CM

## 2024-02-26 DIAGNOSIS — E78.00 ELEVATED LDL CHOLESTEROL LEVEL: ICD-10-CM

## 2024-02-26 DIAGNOSIS — N40.0 BENIGN PROSTATIC HYPERPLASIA, UNSPECIFIED WHETHER LOWER URINARY TRACT SYMPTOMS PRESENT: Primary | ICD-10-CM

## 2024-02-26 DIAGNOSIS — D64.9 ANEMIA, UNSPECIFIED TYPE: ICD-10-CM

## 2024-02-26 DIAGNOSIS — N40.0 BENIGN PROSTATIC HYPERPLASIA, UNSPECIFIED WHETHER LOWER URINARY TRACT SYMPTOMS PRESENT: ICD-10-CM

## 2024-02-26 LAB
ALBUMIN SERPL BCP-MCNC: 3.4 G/DL (ref 3.4–5)
ALP SERPL-CCNC: 83 U/L (ref 33–136)
ALT SERPL W P-5'-P-CCNC: 16 U/L (ref 10–52)
ANION GAP SERPL CALC-SCNC: 16 MMOL/L (ref 10–20)
AST SERPL W P-5'-P-CCNC: 15 U/L (ref 9–39)
BILIRUB SERPL-MCNC: 0.5 MG/DL (ref 0–1.2)
BUN SERPL-MCNC: 62 MG/DL (ref 6–23)
CALCIUM SERPL-MCNC: 8.8 MG/DL (ref 8.6–10.3)
CHLORIDE SERPL-SCNC: 105 MMOL/L (ref 98–107)
CHOLEST SERPL-MCNC: 88 MG/DL (ref 0–199)
CHOLESTEROL/HDL RATIO: 2.6
CO2 SERPL-SCNC: 23 MMOL/L (ref 21–32)
CREAT SERPL-MCNC: 2.9 MG/DL (ref 0.5–1.3)
EGFRCR SERPLBLD CKD-EPI 2021: 22 ML/MIN/1.73M*2
FERRITIN SERPL-MCNC: 194 NG/ML (ref 20–300)
GLUCOSE SERPL-MCNC: 140 MG/DL (ref 74–99)
HDLC SERPL-MCNC: 34.4 MG/DL
IRON SATN MFR SERPL: 21 % (ref 25–45)
IRON SERPL-MCNC: 50 UG/DL (ref 35–150)
LDLC SERPL CALC-MCNC: 28 MG/DL
NON HDL CHOLESTEROL: 54 MG/DL (ref 0–149)
POTASSIUM SERPL-SCNC: 4.7 MMOL/L (ref 3.5–5.3)
PROT SERPL-MCNC: 6.5 G/DL (ref 6.4–8.2)
SODIUM SERPL-SCNC: 139 MMOL/L (ref 136–145)
TIBC SERPL-MCNC: 242 UG/DL (ref 240–445)
TRIGL SERPL-MCNC: 126 MG/DL (ref 0–149)
UIBC SERPL-MCNC: 192 UG/DL (ref 110–370)
VLDL: 25 MG/DL (ref 0–40)

## 2024-02-26 PROCEDURE — 82746 ASSAY OF FOLIC ACID SERUM: CPT

## 2024-02-26 PROCEDURE — 84165 PROTEIN E-PHORESIS SERUM: CPT | Performed by: INTERNAL MEDICINE

## 2024-02-26 PROCEDURE — 82728 ASSAY OF FERRITIN: CPT

## 2024-02-26 PROCEDURE — 80053 COMPREHEN METABOLIC PANEL: CPT

## 2024-02-26 PROCEDURE — 80061 LIPID PANEL: CPT

## 2024-02-26 PROCEDURE — 84153 ASSAY OF PSA TOTAL: CPT

## 2024-02-26 PROCEDURE — 86320 SERUM IMMUNOELECTROPHORESIS: CPT | Performed by: INTERNAL MEDICINE

## 2024-02-26 PROCEDURE — 36415 COLL VENOUS BLD VENIPUNCTURE: CPT

## 2024-02-26 PROCEDURE — 86334 IMMUNOFIX E-PHORESIS SERUM: CPT

## 2024-02-26 PROCEDURE — 84155 ASSAY OF PROTEIN SERUM: CPT

## 2024-02-26 PROCEDURE — 82043 UR ALBUMIN QUANTITATIVE: CPT

## 2024-02-26 PROCEDURE — 84402 ASSAY OF FREE TESTOSTERONE: CPT

## 2024-02-26 PROCEDURE — 82607 VITAMIN B-12: CPT

## 2024-02-26 PROCEDURE — 84165 PROTEIN E-PHORESIS SERUM: CPT

## 2024-02-26 PROCEDURE — 82570 ASSAY OF URINE CREATININE: CPT

## 2024-02-26 PROCEDURE — 85018 HEMOGLOBIN: CPT

## 2024-02-26 PROCEDURE — 85014 HEMATOCRIT: CPT

## 2024-02-26 PROCEDURE — 83550 IRON BINDING TEST: CPT

## 2024-02-26 PROCEDURE — 83540 ASSAY OF IRON: CPT

## 2024-02-27 LAB
CREAT UR-MCNC: 139.3 MG/DL (ref 20–370)
FOLATE SERPL-MCNC: 14 NG/ML
HCT VFR BLD AUTO: 38.1 % (ref 41–52)
HGB BLD-MCNC: 11.7 G/DL (ref 13.5–17.5)
MICROALBUMIN UR-MCNC: 1565.5 MG/L
MICROALBUMIN/CREAT UR: 1123.8 UG/MG CREAT
PROT SERPL-MCNC: 6.3 G/DL (ref 6.4–8.2)
PSA SERPL-MCNC: <0.1 NG/ML
VIT B12 SERPL-MCNC: 288 PG/ML (ref 211–911)

## 2024-02-28 ENCOUNTER — OFFICE VISIT (OUTPATIENT)
Dept: UROLOGY | Facility: HOSPITAL | Age: 79
End: 2024-02-28
Payer: MEDICARE

## 2024-02-28 DIAGNOSIS — I12.9 BENIGN HYPERTENSION WITH CHRONIC KIDNEY DISEASE, STAGE IV (MULTI): ICD-10-CM

## 2024-02-28 DIAGNOSIS — C61 PROSTATE CANCER (MULTI): Primary | ICD-10-CM

## 2024-02-28 DIAGNOSIS — N18.4 BENIGN HYPERTENSION WITH CHRONIC KIDNEY DISEASE, STAGE IV (MULTI): ICD-10-CM

## 2024-02-28 DIAGNOSIS — E29.1 HYPOGONADISM IN MALE: ICD-10-CM

## 2024-02-28 PROCEDURE — 1160F RVW MEDS BY RX/DR IN RCRD: CPT | Performed by: STUDENT IN AN ORGANIZED HEALTH CARE EDUCATION/TRAINING PROGRAM

## 2024-02-28 PROCEDURE — 1036F TOBACCO NON-USER: CPT | Performed by: STUDENT IN AN ORGANIZED HEALTH CARE EDUCATION/TRAINING PROGRAM

## 2024-02-28 PROCEDURE — 99214 OFFICE O/P EST MOD 30 MIN: CPT | Performed by: STUDENT IN AN ORGANIZED HEALTH CARE EDUCATION/TRAINING PROGRAM

## 2024-02-28 PROCEDURE — 1157F ADVNC CARE PLAN IN RCRD: CPT | Performed by: STUDENT IN AN ORGANIZED HEALTH CARE EDUCATION/TRAINING PROGRAM

## 2024-02-28 PROCEDURE — 1159F MED LIST DOCD IN RCRD: CPT | Performed by: STUDENT IN AN ORGANIZED HEALTH CARE EDUCATION/TRAINING PROGRAM

## 2024-02-28 RX ORDER — TESTOSTERONE GEL, 1% 10 MG/G
50 GEL TRANSDERMAL DAILY
Qty: 30 PACKET | Refills: 5 | Status: SHIPPED | OUTPATIENT
Start: 2024-02-28 | End: 2024-06-06

## 2024-02-28 NOTE — PROGRESS NOTES
History of prostate cancer status post radical prostatectomy, hypogonadism on TRT     78-year-old very pleasant gentleman presenting with the above conditions. We had a very long and extensive discussion with the patient regarding the pathophysiology, differential diagnosis, risk factor, management, natural history, incidence and diagnostic work-up of the condition. PSA <0.10, testosterone 1036, hematocrit 42%, in June 2022, PSA <0.10, testosterone 32, hematocrit 38%. On 2/226/24 0.1, H&H 11.7 and 38.1. Test is still pending     Once again, We discussed at length the risk, benefits, potential complication, adverse event of TRT especially in his condition explained to him that therapy might put him at high risk of cardiac events, stroke and recurrence of his prostate cancer. He verbalized understanding elect to proceed. We discussed today his last testosterone level is extremely low at 32. We discussed injections vs increasing his AndroGel to 2 packs per day. Patient elects to proceed with increasing AndroGel to 2 packs per day.      Plan  Fu T levels and PSa done yesterday   AndroGel 2 pack per day   Follow-up in 6 months with another set of labs (CBC, testosterone, H&H)              Chief Complaint     6 month followup      History of Present Illness78-year-old very pleasant gentleman, case of testosterone deficiency on testosterone placement therapy in the form of testosterone 50 mg per 5 g 1% transdermal gel 2 packet daily. Most recent labs in September 2023 PSA <0.10, testosterone 1036, hematocrit 42%. Patient has history of prostate cancer status post radical prostatectomy 15 years prior to presentation while PSA was less than 0.1. As per patient margins were negative. Extent to him the TRT might put him at high risk of disease recurrence.   On 2/226/24 0.1, H&H 11.7 and 38.1. Test is still pending.     We had a long and extensive discussion with the patient regarding hypogonadism I explained to him that since  the patient had to low level below the reference range of testosterone that he might have hypogonadism. We had an extensive explanation about the pathophysiology, risk factor, differential diagnosis and management of hypogonadism. We discussed testosterone replacement therapy I explained at length to the patient and his wife the risks of testosterone replacement therapy. We discussed the correlation of testosterone replacement therapy with prostate cancer explained to him that he might be at high risk of detecting prostate cancer and this is need to follow-up closely his PSA and always make sure that is not increasing or having a high doubling time. Patient does not have any family history of prostate cancer. We also discussed cardiac events with TRT including MRIs, heart failure, PEs. Discussed with the patient that many studies so far reporting a small high risk of cardiac events with TRT. We discussed the black box warning of TRT. Also discussed the risks of polycythemia and the increased risk for stroke in case his hematocrit was above 52. Patient verbalized understanding of all the risk and benefit of this on physical therapy and he wants to proceed. We discussed the different formulation including testosterone gel, testosterone transdermal patches, IM injection and oral testosterone. Informed him that oral testosterone is a relatively new medication on the market. We discussed Jatenzo as an oral formulation of testosterone. Explained all the risk benefit potential complication of the medication and the black box warning of hypertension. However I explained to the patient that his result of total testosterone is low normal and not deficient. Explained to him that he needs to repeat his total testosterone level and observe the trend of the value. If the value is still trending downwards, he needs to continue being TRT. Patient verbalized understanding and wishes to proceed.         Review of Systems  All systems  were reviewed. Anything negative was noted in the HPI.      Active Problems  Problems    · Anemia (285.9) (D64.9)   · Benign hypertension with chronic kidney disease, stage IV (403.10,585.4) (I12.9,N18.4)   · Chronic kidney disease (CKD), stage IV (severe) (585.4) (N18.4)   · Chronic kidney disease (CKD), stage IV (severe)   · Class 3 severe obesity due to excess calories with serious comorbidity and body mass  index (BMI) of 40.0 to 44.9 in adult (278.01,V85.41) (E66.01,Z68.41)   · Class 3 severe obesity with serious comorbidity and body mass index (BMI) of 40.0 to  44.9 in adult, unspecified obesity type (278.01,V85.41) (E66.01,Z68.41)   · Class 3 severe obesity with serious comorbidity and body mass index (BMI) of 40.0 to      44.9 in adult, unspecified obesity type   · Degenerative disc disease, lumbar (722.52) (M51.36)   · Dependent edema (782.3) (R60.9)   · Diabetes mellitus (250.00) (E11.9)   · 1998   · Elevated LDL cholesterol level (272.0) (E78.00)   · Encounter for administration of vaccine (V05.9) (Z23)   · Hip pain, left (719.45) (M25.552)   · Hyperkalemia (276.7) (E87.5)   · Hypogonadism male (257.2) (E29.1)   · Klinefelter's syndrome (758.7) (Q98.4)   · Knee pain (719.46) (M25.569)   · Long term current use of insulin (V58.67) (Z79.4)   · Medicare annual wellness visit, subsequent (V70.0) (Z00.00)   · Prostate cancer (185) (C61)   · Screening for colon cancer (V76.51) (Z12.11)   · 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)   · Type 2 diabetes mellitus with nephropathy (250.40,583.81) (E11.21)   · Venous insufficiency (459.81) (I87.2)   · Vitamin D deficiency (268.9) (E55.9)     Past Medical History  Problems    · History of Acute non-recurrent pansinusitis (461.8) (J01.40)   · Resolved Date: 12 Mar 2022   · History of Benign hypertension with CKD (chronic kidney disease) stage III  (403.10,585.3) (I12.9,N18.30)   · Resolved Date: 12 Mar 2022   · History of BPH (benign prostatic hypertrophy) (600.00)  (N40.0)   · History of Cellulitis of left lower extremity (682.6) (L03.116)   · Resolved Date: 12 Mar 2022   · History of Degenerative joint disease of cervical spine (721.0) (M47.812)   · History of Edema of extremity (782.3) (R60.0)   · Resolved Date: 12 Mar 2022   · History of Encounter for administration of vaccine (V05.9) (Z23)   · Resolved Date: 30 Oct 2017   · History of Hemoglobin A1c greater than 9.0% (790.29) (R73.09)   · 10.6%   · History of acute bronchitis (V12.69) (Z87.09)   · Resolved Date: 12 Mar 2022   · History of bronchitis (V12.69) (Z87.09)   · 2005   · History of bursitis (V13.59) (Z87.39)   · 2005   · History of cataract (V12.49) (Z86.69)   · 6/2007   · History of diabetic retinopathy (V12.29) (Z86.39)   · History of edema (V13.89) (Z87.898)   · Resolved Date: 12 Mar 2022   · History of elevated prostate specific antigen (PSA) (V13.89) (Z87.898)   · Resolved Date: 05 Sep 2017   · History of fatigue (V13.89) (Z87.898)   · Resolved Date: 12 Mar 2022   · History of hematuria (V13.09) (Z87.448)   · Resolved Date: 12 Mar 2022   · History of hyperlipidemia (V12.29) (Z86.39)   · History of hypertension (V12.59) (Z86.79)   · 1997   · History of Klinefelter syndrome (V13.69) (Q98.4)   · History of obesity (V12.29) (Z86.39)   · History of osteoarthritis (V13.4) (Z87.39)   · 2003   · History of partial thickness sunburn (V13.3) (Z87.2)   · Resolved Date: 12 Mar 2022   · History of prostatitis (V13.89) (Z87.438)   · History of stress test (V15.89) (Z92.89)   · nuclear stress test   · History of urinary tract infection (V13.02) (Z87.440)   · Resolved Date: 12 Mar 2022   · History of Low back pain with radiation (724.2) (M54.50)   · Resolved Date: 12 Mar 2022   · History of Low vitamin D level (790.6) (R79.89)   · Resolved Date: 12 Mar 2022   · History of Lumbosacral strain, subsequent encounter (V58.89,846.0) (S39.012D)   · Resolved Date: 12 Mar 2022   · History of MMT (medial meniscus tear) (836.0)  (S83.249A)   · left and right knees   · History of Morbid obesity with BMI of 40.0-44.9, adult (278.01,V85.41) (E66.01,Z68.41)   · Resolved Date: 12 Mar 2022   · History of Obesity, Class II, BMI 35-39.9 (278.00) (E66.9)   · Resolved Date: 12 Mar 2022   · History of Obesity, Class III, BMI 40-49.9 (morbid obesity) (278.01) (E66.01)   · Resolved Date: 12 Mar 2022   · History of Prolonged capillary refill time (785.9) (R09.89)   · Resolved Date: 12 Mar 2022   · History of Proteinuria (791.0) (R80.9)   · 1997   · History of Sprain of costal cartilage, initial encounter (848.3) (S23.41XA)   · Resolved Date: 12 Mar 2022   · History of Sprain of ribs (848.3) (S23.41XA)   · Resolved Date: 12 Mar 2022   · History of Type 2 diabetes mellitus with mild nonproliferative diabetic retinopathy without  macular edema, bilateral (250.50,362.04) (E11.3293)   · Resolved Date: 12 Mar 2022   · History of Uncontrolled diabetes mellitus with diabetic nephropathy (250.42,583.81)   · Resolved Date: 22 Jun 2022   · Uncontrolled diabetes mellitus with diabetic nephropathy   · History of Venous stasis ulcer with varicose veins of lower extremity (454.0)  (I83.009,L97.909)   · Resolved Date: 12 Mar 2022   · History of Weakness of lower extremity (729.89) (R29.898)   · Resolved Date: 12 Mar 2022     Surgical History  Problems    · History of Cataract Surgery   · History of Cholecystectomy   · History of Colonoscopy   · polypectomy   · History of Diagnostic Cystoscopy   · History of Esophagogastroduodenoscopy With Biopsy   · History of Gallbladder Surgery   · History of Knee Surgery Left   · History of Knee Surgery Right   · 12/7/2015   · History of Prostatectomy Perineal Radical   · History of Tonsillectomy     Family History  Mother    · Family history of Carcinoma Of The Pancreas   · Family history of Diabetes Mellitus (V18.0)   · Family history of Heart Disease (V17.49)  Father    · Family history of Reported Family History Of Kidney  Disease  Brother    · Family history of Hearing Loss (V19.2)   · Family history of Serum Prostate-specific Antigen (PSA) Elevated  Maternal Grandmother    · Family history of cardiac disorder (V17.49) (Z82.49)  Paternal Grandmother    · Family history of cardiac disorder (V17.49) (Z82.49)  Maternal Grandfather    · Family history of cardiac disorder (V17.49) (Z82.49)  Paternal Grandfather    · Family history of cardiac disorder (V17.49) (Z82.49)     Social History  Problems    · Active advance directive (V49.89) (Z78.9)   · Caffeine use (V49.89) (Z78.9)   · Does not exercise (V69.0) (Z72.3)   · Does not use illicit drugs (V49.89) (Z78.9)   · Employed   · Former smoker (V15.82) (Z87.891)   · No alcohol use   · Single     Allergies  Medication    · cortisone   Recorded By: Liat Francisco; 8/12/2017 9:19:52 AM   · Penicillins   Recorded By: Layla Etienne; 5/29/2013 10:14:35 AM     Current Meds     Medication Name Instruction   amLODIPine Besylate 5 MG Oral Tablet take 1 tablet by mouth once daily   Calcitriol 0.25 MCG Oral Capsule     Fish Oil 1200 MG Oral Capsule     FreeStyle Lite Test In Vitro Strip TEST three times a day as directed   HumaLOG 100 UNIT/ML Injection Solution INJECT 6 to 16 UNITS SUBCUTANEOUSLY THREE TIMES A DAY   HumaLOG KwikPen 100 UNIT/ML Subcutaneous Solution Pen-injector INJECT 6 to 16 UNIT 3 times daily   hydrALAZINE HCl - 10 MG Oral Tablet     Lantus 100 UNIT/ML Subcutaneous Solution inject 65 units subcutaneously once daily as directed   Metoprolol Succinate ER 50 MG Oral Tablet Extended Release 24 Hour take 1 tablet by mouth once daily   NovoFine 32G X 6 MM MISC USE TWO TO THREE TIMES A DAY AS INSTRUCTED   Pravastatin Sodium 20 MG Oral Tablet take 1 tablet by mouth once daily   Testosterone 50 MG/5GM (1%) Transdermal Gel Apply 2 packets daily as directed   Testosterone 50 MG/5GM (1%) Transdermal Gel Apply 2 packets daily as directed   Torsemide 20 MG Oral Tablet Take 1 tablet daily       Physical Exam  General: Well developed, well nourished, alert and cooperative, appears in no acute distress  Eyes: Non-injected conjunctiva, sclera clear, no proptosis  Cardiac: Extremities are warm and well perfused. No edema, cyanosis or pallor  Lungs: Breathing is easy, non-labored. Speaking in clear and complete sentences. Normal diaphragmatic movement  MSK: Ambulatory with steady gait, unassisted  Neuro: Alert and oriented to person, place, and time  Psych: Demonstrates good judgment and reason, without hallucinations, abnormal affect or abnormal behaviors  Skin: No obvious lesions, no rashes     No CVA tenderness bilaterally   No suprapubic pain or discomfort

## 2024-02-29 LAB
ALBUMIN: 3.1 G/DL (ref 3.4–5)
ALPHA 1 GLOBULIN: 0.3 G/DL (ref 0.2–0.6)
ALPHA 2 GLOBULIN: 0.9 G/DL (ref 0.4–1.1)
BETA GLOBULIN: 0.8 G/DL (ref 0.5–1.2)
GAMMA GLOBULIN: 1.2 G/DL (ref 0.5–1.4)
PATH REVIEW-SERUM PROTEIN ELECTROPHORESIS: ABNORMAL
PROTEIN ELECTROPHORESIS COMMENT: ABNORMAL

## 2024-03-02 LAB
IMMUNOFIXATION COMMENT: NORMAL
PATH REVIEW - SERUM IMMUNOFIXATION: NORMAL
TESTOSTERONE FREE (CHAN): 160.5 PG/ML (ref 30–135)
TESTOSTERONE,TOTAL,LC-MS/MS: 689 NG/DL (ref 250–1100)

## 2024-03-10 NOTE — PROGRESS NOTES
"Subjective   Patient ID: Michael Szymanski is a 78 y.o. male who presents for Follow-up (6 month follow up).    HPI     Reports difficulty ambulating due to lower back pain and left hip pain.  Pain radiates down his right leg. Was seeing a chiropractor but has not seen him for a while. Also saw Dr. Sykes and was told he is not a surgical candidate. Has not done PT.     Patient's sister was very concerned about his \"consistently low glucose readings\" and how this may be affecting his cognition. Patient is now wearing a CGM but prior to this he was checking his sugar 3x daily. Was having glucose <70 about 1/3 time. Now he is wearing CGM monitor is showing only 1% low glucose readings. Currently taking Lantus 68 units daily, lispro SSI    MMSE 28/30        Review of Systems   All other systems reviewed and are negative.      Objective   There were no vitals taken for this visit.    Physical Exam  Constitutional:       Appearance: He is obese.   HENT:      Head: Normocephalic and atraumatic.   Cardiovascular:      Rate and Rhythm: Normal rate and regular rhythm.      Heart sounds: Normal heart sounds. No murmur heard.     No gallop.   Pulmonary:      Effort: Pulmonary effort is normal. No respiratory distress.      Breath sounds: No wheezing or rales.   Musculoskeletal:      Comments: Slow and cautious gait  Uses walker to ambulate    Skin:     General: Skin is warm and dry.      Findings: No rash.   Neurological:      Mental Status: He is alert and oriented to person, place, and time. Mental status is at baseline.   Psychiatric:         Mood and Affect: Mood normal.         Behavior: Behavior normal.         Assessment/Plan            #Low back pain, impaired mobility        -Evaluated at [facility] and was told he is not a surgical candidate        -CT lumbar 9/4/19: mod-severe OA       -Refer to PT and pain management      #HTN  -high today but improved. Cont amlodipine, metoprolol and torsemide  -He does not " want to start any new HTN meds   -No ACE/ARB due to borderline hyperkalemia     #HLD  -Cont pravastatin          #DM  -Follows with Dr. Brizuela. Cont glipizide, Lantus and SSI   -CGM showed avg sugar 180, ~A1c 7.8% per CGM     #CKD stage 4  -Follows with Dr. Gonzalez. No ACE/ARB due to borderline hyperkalemia     #Klinefelter's syndrome, low testosterone, h/o prostate ca   -Follows with urology. cont testosterone supplementation      Colonoscopy: Ordered  PSA: 6/14/22     RTC 6 mo x

## 2024-03-14 ENCOUNTER — OFFICE VISIT (OUTPATIENT)
Dept: PRIMARY CARE | Facility: CLINIC | Age: 79
End: 2024-03-14
Payer: MEDICARE

## 2024-03-14 VITALS
HEART RATE: 56 BPM | DIASTOLIC BLOOD PRESSURE: 78 MMHG | BODY MASS INDEX: 38.18 KG/M2 | WEIGHT: 233 LBS | OXYGEN SATURATION: 96 % | SYSTOLIC BLOOD PRESSURE: 157 MMHG

## 2024-03-14 DIAGNOSIS — G89.29 CHRONIC BILATERAL LOW BACK PAIN WITH LEFT-SIDED SCIATICA: ICD-10-CM

## 2024-03-14 DIAGNOSIS — M54.42 CHRONIC BILATERAL LOW BACK PAIN WITH LEFT-SIDED SCIATICA: ICD-10-CM

## 2024-03-14 DIAGNOSIS — E53.8 B12 DEFICIENCY: ICD-10-CM

## 2024-03-14 DIAGNOSIS — E11.21 TYPE 2 DIABETES MELLITUS WITH NEPHROPATHY (MULTI): Primary | ICD-10-CM

## 2024-03-14 PROCEDURE — 1036F TOBACCO NON-USER: CPT | Performed by: INTERNAL MEDICINE

## 2024-03-14 PROCEDURE — 1160F RVW MEDS BY RX/DR IN RCRD: CPT | Performed by: INTERNAL MEDICINE

## 2024-03-14 PROCEDURE — 99214 OFFICE O/P EST MOD 30 MIN: CPT | Performed by: INTERNAL MEDICINE

## 2024-03-14 PROCEDURE — 1157F ADVNC CARE PLAN IN RCRD: CPT | Performed by: INTERNAL MEDICINE

## 2024-03-14 PROCEDURE — 3078F DIAST BP <80 MM HG: CPT | Performed by: INTERNAL MEDICINE

## 2024-03-14 PROCEDURE — 1159F MED LIST DOCD IN RCRD: CPT | Performed by: INTERNAL MEDICINE

## 2024-03-14 PROCEDURE — 3077F SYST BP >= 140 MM HG: CPT | Performed by: INTERNAL MEDICINE

## 2024-03-14 RX ORDER — LANOLIN ALCOHOL/MO/W.PET/CERES
1000 CREAM (GRAM) TOPICAL DAILY
Qty: 90 TABLET | Refills: 0 | Status: SHIPPED | OUTPATIENT
Start: 2024-03-14 | End: 2025-03-14

## 2024-03-14 NOTE — PATIENT INSTRUCTIONS
Physical Therapy main number [phone]  [facility] ([facility]) [phone]    [facility] Pain management (Dr. Caicedo, Dr. Granda, Dr. Warner and Dr. Shukla)--[phone]    Take b12 1,000mcg daily    I will talk to your sister     3 months

## 2024-05-01 ENCOUNTER — OFFICE VISIT (OUTPATIENT)
Dept: ENDOCRINOLOGY | Facility: CLINIC | Age: 79
End: 2024-05-01
Payer: MEDICARE

## 2024-05-01 VITALS — SYSTOLIC BLOOD PRESSURE: 147 MMHG | HEART RATE: 69 BPM | DIASTOLIC BLOOD PRESSURE: 68 MMHG

## 2024-05-01 DIAGNOSIS — E11.65 TYPE 2 DIABETES MELLITUS WITH HYPERGLYCEMIA, WITH LONG-TERM CURRENT USE OF INSULIN (MULTI): Primary | ICD-10-CM

## 2024-05-01 DIAGNOSIS — Z79.4 TYPE 2 DIABETES MELLITUS WITH HYPERGLYCEMIA, WITH LONG-TERM CURRENT USE OF INSULIN (MULTI): Primary | ICD-10-CM

## 2024-05-01 PROCEDURE — 1159F MED LIST DOCD IN RCRD: CPT | Performed by: INTERNAL MEDICINE

## 2024-05-01 PROCEDURE — 3077F SYST BP >= 140 MM HG: CPT | Performed by: INTERNAL MEDICINE

## 2024-05-01 PROCEDURE — 1157F ADVNC CARE PLAN IN RCRD: CPT | Performed by: INTERNAL MEDICINE

## 2024-05-01 PROCEDURE — 1160F RVW MEDS BY RX/DR IN RCRD: CPT | Performed by: INTERNAL MEDICINE

## 2024-05-01 PROCEDURE — 99214 OFFICE O/P EST MOD 30 MIN: CPT | Performed by: INTERNAL MEDICINE

## 2024-05-01 PROCEDURE — 3078F DIAST BP <80 MM HG: CPT | Performed by: INTERNAL MEDICINE

## 2024-05-01 RX ORDER — BLOOD SUGAR DIAGNOSTIC
STRIP MISCELLANEOUS
Qty: 400 EACH | Refills: 0 | Status: SHIPPED | OUTPATIENT
Start: 2024-05-01

## 2024-05-01 NOTE — PATIENT INSTRUCTIONS
Rapid A1c 7.3%    RECOMMENDATIONS  Lantus 65 units at bedtime    Humalog BEFORE MEALS ONLY   None If glucose under 120 mg/dl  Take 6 units if glucose 120-150 mg/dl, add 1 unit for every 50 over glucose 101 mg/dl    Do not repeat Humalog overnight.     Will switch Humalog to vial and syringe at next renewal    Change alarms on DexCom  High 250 mg/dl  Low 80 mg/dl    Do not drive if DexCom glucose is under 90 mg/dl.     Follow up 3 months

## 2024-05-01 NOTE — PROGRESS NOTES
History Of Present Illness  Michael Szymanski is a 78 y.o. male     Duration of type 2 diabetes mellitus:  25 years  Complications:  chronic kidney disease     Weight stabilized, last weight 236 lbs at home     Lantus 68 units at bedtime  Humalog 6 units before meals, add 2 units for every 50 over 101 mg/dl.     He takes 8-9 units for hyperglycemia at 03-04    Patient is testing glucose 2 times daily  Records reviewed, on file     Last eye exam:  2023    Past Medical History  He has a past medical history of Acute pansinusitis, unspecified (08/06/2019), Benign prostatic hyperplasia without lower urinary tract symptoms, Cellulitis of left lower limb (10/08/2021), Encounter for immunization (03/21/2017), Hyperlipidemia, unspecified (12/10/2015), Hypertensive chronic kidney disease with stage 1 through stage 4 chronic kidney disease, or unspecified chronic kidney disease (12/09/2021), Klinefelter syndrome, unspecified ([facility]), Localized edema (09/29/2021), Low back pain, unspecified (04/25/2018), Morbid (severe) obesity due to excess calories (Multi) (05/14/2020), Morbid (severe) obesity due to excess calories (Multi) (12/11/2021), Obesity, unspecified (08/07/2019), Other abnormal glucose, Other conditions influencing health status (03/12/2022), Other conditions influencing health status (03/12/2022), Other specified abnormal findings of blood chemistry (02/13/2020), Other specified symptoms and signs involving the circulatory and respiratory systems (12/31/2018), Other symptoms and signs involving the musculoskeletal system (04/25/2018), Other tear of medial meniscus, current injury, unspecified knee, initial encounter, Personal history of diseases of the skin and subcutaneous tissue (08/07/2019), Personal history of other diseases of male genital organs, Personal history of other diseases of the circulatory system, Personal history of other diseases of the musculoskeletal system and connective tissue, Personal history  of other diseases of the musculoskeletal system and connective tissue, Personal history of other diseases of the nervous system and sense organs, Personal history of other diseases of the respiratory system (06/25/2018), Personal history of other diseases of the respiratory system, Personal history of other diseases of urinary system (04/19/2017), Personal history of other endocrine, nutritional and metabolic disease, Personal history of other endocrine, nutritional and metabolic disease, Personal history of other endocrine, nutritional and metabolic disease, Personal history of other medical treatment, Personal history of other specified conditions (05/14/2020), Personal history of other specified conditions (10/21/2021), Personal history of other specified conditions (06/19/2013), Personal history of urinary (tract) infections (01/13/2022), Proteinuria, unspecified, Spondylosis without myelopathy or radiculopathy, cervical region, Sprain of ribs, initial encounter (09/09/2021), Sprain of ribs, initial encounter (09/09/2021), Strain of muscle, fascia and tendon of lower back, subsequent encounter (09/10/2019), Type 2 diabetes mellitus with mild nonproliferative diabetic retinopathy without macular edema, bilateral (Multi) (12/09/2021), and 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    Surgical History  He has a past surgical history that includes Tonsillectomy (05/29/2013); Gallbladder surgery (06/19/2013); Other surgical history (06/19/2013); Cataract extraction (06/19/2013); Colonoscopy (02/06/2017); Knee surgery (02/06/2017); Esophagogastroduodenoscopy (02/06/2017); Cystoscopy (02/06/2017); Cholecystectomy (02/06/2017); and Knee surgery (02/06/2017).     Social History  He reports that he has never smoked. He has never used smokeless tobacco. No history on file for alcohol use and drug use.    Family History  Family History   Problem Relation Name  Age of Onset    Pancreatic cancer Mother      Diabetes Mother      Heart disease Mother      Kidney disease Father      Hearing loss Brother      Other (elevated psa) Brother          serum    Other (cardiac disorder) Maternal Grandmother      Other (cardiac disorder) Maternal Grandfather      Other (cardiac disorder) Paternal Grandmother      Other (cardiac disorder) Paternal Grandfather         Medications  Current Outpatient Medications   Medication Instructions    acetaminophen (Tylenol) 325 mg tablet 1 tablet as needed    amLODIPine (NORVASC) 10 mg, oral, Every 24 hours    aspirin 81 mg chewable tablet 1 tablet, oral, Daily    blood-glucose sensor (Dexcom G7 Sensor) device For continuous glucose monitoring.  Change every 10 days.    cyanocobalamin (VITAMIN B-12) 1,000 mcg, oral, Daily    Dexcom G4 platinum  (Dexcom G7 ) misc For continuous glucose monitoring.    FreeStyle Lite Strips strip For glucose testing 3 times daily    insulin lispro (HumaLOG KwikPen Insulin) 100 unit/mL injection as directed    Lantus U-100 Insulin 65 Units, subcutaneous, Nightly    metoprolol succinate XL (TOPROL-XL) 50 mg, oral, Every 24 hours    omega 3-dha-epa-fish oil (Fish OiL) 1,200 (144-216) mg capsule Fish Oil 1200 MG Oral Capsule  Refills: 0  Start: 6-May-2021    pen needle, diabetic (NOVOFINE 32 MISC) miscellaneous, 2-3 times a day    pravastatin (Pravachol) 20 mg tablet take 1 tablet by mouth ONCE every 24 hours    testosterone (ANDROGEL) 50 mg, transdermal, Daily    torsemide (Demadex) 20 mg tablet 1 tablet, oral, Daily       Allergies  Cortisone and Penicillins    Review of Systems   Constitutional:  Negative for appetite change and fever.   HENT:  Positive for tinnitus. Negative for sore throat.         Denies dry mouth   Eyes:  Negative for visual disturbance.   Respiratory:  Negative for cough and shortness of breath.    Cardiovascular:  Negative for chest pain.   Gastrointestinal:  Negative for  abdominal pain, constipation, diarrhea, nausea and vomiting.   Endocrine: Negative for polydipsia and polyuria.   Genitourinary:  Negative for frequency.   Musculoskeletal:  Negative for arthralgias.   Skin:  Negative for rash.   Neurological:  Negative for headaches.   Psychiatric/Behavioral:  The patient is not nervous/anxious.          Last Recorded Vitals  Blood pressure 147/68, pulse 69.    Physical Exam  Constitutional:       General: He is not in acute distress.  HENT:      Head: Normocephalic.   Eyes:      Extraocular Movements: Extraocular movements intact.   Neurological:      Mental Status: He is alert.   Psychiatric:         Mood and Affect: Affect normal.          Relevant Results  Glucose (mg/dL)   Date Value   02/26/2024 140 (H)   12/18/2023 160 (H)   01/03/2023 141 (H)   06/20/2022 140 (H)   12/14/2021 187 (H)     POC HEMOGLOBIN A1c (%)   Date Value   01/25/2024 9.7 (A)   10/24/2023 9.3 (A)     Hemoglobin A1C (%)   Date Value   09/24/2021 8.5 (A)   07/08/2020 9.0     Bicarbonate (mmol/L)   Date Value   02/26/2024 23   12/18/2023 26   01/03/2023 29   06/20/2022 21   12/14/2021 27     Urea Nitrogen (mg/dL)   Date Value   02/26/2024 62 (H)   12/18/2023 38 (H)   01/03/2023 44 (H)   06/20/2022 67 (H)   12/14/2021 48 (H)     Creatinine (mg/dL)   Date Value   02/26/2024 2.90 (H)   12/18/2023 3.03 (H)   01/03/2023 2.88 (H)   06/20/2022 3.11 (H)   12/14/2021 2.80 (H)     Lab Results   Component Value Date    CHOL 88 02/26/2024    CHOL 161 01/03/2023    CHOL 171 06/08/2021     Lab Results   Component Value Date    HDL 34.4 02/26/2024    HDL 40.8 01/03/2023    HDL 34.9 (A) 06/08/2021     Lab Results   Component Value Date    LDLCALC 28 02/26/2024     Lab Results   Component Value Date    TRIG 126 02/26/2024    TRIG 160 (H) 01/03/2023    TRIG 315 (H) 06/08/2021     Lab Results   Component Value Date    TSH 1.25 05/20/2019       IMPRESSION  TYPE 2 DIABETES MELLITUS   LONG TERM CURRENT INSULIN USE   Rapid A1c  7.3%  Some history of hypoglycemia      RECOMMENDATIONS  Lantus 65 units at bedtime    Humalog BEFORE MEALS ONLY   None If glucose under 120 mg/dl  Take 6 units if glucose 120-150 mg/dl, add 1 unit for every 50 over glucose 101 mg/dl    Do not repeat Humalog overnight.     Will switch Humalog to vial and syringe at next renewal    Change alarms on DexCom  High 250 mg/dl  Low 80 mg/dl    Do not drive if DexCom glucose is under 90 mg/dl.     Follow up 3 months

## 2024-05-07 DIAGNOSIS — I10 HYPERTENSION, UNSPECIFIED TYPE: ICD-10-CM

## 2024-05-09 RX ORDER — METOPROLOL SUCCINATE 50 MG/1
50 TABLET, EXTENDED RELEASE ORAL DAILY
Qty: 180 TABLET | Refills: 2 | Status: SHIPPED | OUTPATIENT
Start: 2024-05-09

## 2024-05-09 NOTE — TELEPHONE ENCOUNTER
Requested Prescriptions     Pending Prescriptions Disp Refills    metoprolol succinate XL (Toprol-XL) 50 mg 24 hr tablet [Pharmacy Med Name: Metoprolol Succinate ER Oral Tablet Extended Release 24 Hour 50 MG] 180 tablet 2     Sig: TAKE 1 TABLET BY MOUTH ONCE DAILY

## 2024-05-14 ENCOUNTER — LAB (OUTPATIENT)
Dept: LAB | Facility: LAB | Age: 79
End: 2024-05-14
Payer: MEDICARE

## 2024-05-14 DIAGNOSIS — N18.4 CHRONIC KIDNEY DISEASE, STAGE 4 (SEVERE) (MULTI): Primary | ICD-10-CM

## 2024-05-14 DIAGNOSIS — N25.81 SECONDARY HYPERPARATHYROIDISM OF RENAL ORIGIN (MULTI): ICD-10-CM

## 2024-05-14 LAB
ALBUMIN SERPL BCP-MCNC: 3.4 G/DL (ref 3.4–5)
ANION GAP SERPL CALC-SCNC: 15 MMOL/L (ref 10–20)
BUN SERPL-MCNC: 48 MG/DL (ref 6–23)
CALCIUM SERPL-MCNC: 8.4 MG/DL (ref 8.6–10.3)
CHLORIDE SERPL-SCNC: 104 MMOL/L (ref 98–107)
CO2 SERPL-SCNC: 25 MMOL/L (ref 21–32)
CREAT SERPL-MCNC: 2.84 MG/DL (ref 0.5–1.3)
EGFRCR SERPLBLD CKD-EPI 2021: 22 ML/MIN/1.73M*2
ERYTHROCYTE [DISTWIDTH] IN BLOOD BY AUTOMATED COUNT: 13.2 % (ref 11.5–14.5)
GLUCOSE SERPL-MCNC: 159 MG/DL (ref 74–99)
HCT VFR BLD AUTO: 38.1 % (ref 41–52)
HGB BLD-MCNC: 12.1 G/DL (ref 13.5–17.5)
MCH RBC QN AUTO: 30.3 PG (ref 26–34)
MCHC RBC AUTO-ENTMCNC: 31.8 G/DL (ref 32–36)
MCV RBC AUTO: 95 FL (ref 80–100)
NRBC BLD-RTO: 0 /100 WBCS (ref 0–0)
PHOSPHATE SERPL-MCNC: 4.2 MG/DL (ref 2.5–4.9)
PLATELET # BLD AUTO: 227 X10*3/UL (ref 150–450)
POTASSIUM SERPL-SCNC: 5.2 MMOL/L (ref 3.5–5.3)
RBC # BLD AUTO: 4 X10*6/UL (ref 4.5–5.9)
SODIUM SERPL-SCNC: 139 MMOL/L (ref 136–145)
WBC # BLD AUTO: 5.9 X10*3/UL (ref 4.4–11.3)

## 2024-05-14 PROCEDURE — 85027 COMPLETE CBC AUTOMATED: CPT

## 2024-05-14 PROCEDURE — 80069 RENAL FUNCTION PANEL: CPT

## 2024-05-14 PROCEDURE — 83970 ASSAY OF PARATHORMONE: CPT

## 2024-05-14 PROCEDURE — 36415 COLL VENOUS BLD VENIPUNCTURE: CPT

## 2024-05-15 LAB — PTH-INTACT SERPL-MCNC: 192.7 PG/ML (ref 18.5–88)

## 2024-06-06 ENCOUNTER — OFFICE VISIT (OUTPATIENT)
Dept: PRIMARY CARE | Facility: CLINIC | Age: 79
End: 2024-06-06
Payer: MEDICARE

## 2024-06-06 VITALS
SYSTOLIC BLOOD PRESSURE: 147 MMHG | DIASTOLIC BLOOD PRESSURE: 80 MMHG | HEIGHT: 66 IN | WEIGHT: 229 LBS | HEART RATE: 55 BPM | BODY MASS INDEX: 36.8 KG/M2

## 2024-06-06 DIAGNOSIS — Z12.12 ENCOUNTER FOR SCREENING FOR COLORECTAL CANCER IN HIGH RISK PATIENT: ICD-10-CM

## 2024-06-06 DIAGNOSIS — Z12.11 ENCOUNTER FOR SCREENING FOR COLORECTAL CANCER IN HIGH RISK PATIENT: ICD-10-CM

## 2024-06-06 DIAGNOSIS — Z91.89 ENCOUNTER FOR SCREENING FOR COLORECTAL CANCER IN HIGH RISK PATIENT: ICD-10-CM

## 2024-06-06 DIAGNOSIS — E78.00 ELEVATED LDL CHOLESTEROL LEVEL: Primary | ICD-10-CM

## 2024-06-06 PROCEDURE — 99213 OFFICE O/P EST LOW 20 MIN: CPT | Performed by: INTERNAL MEDICINE

## 2024-06-06 PROCEDURE — 3079F DIAST BP 80-89 MM HG: CPT | Performed by: INTERNAL MEDICINE

## 2024-06-06 PROCEDURE — 1160F RVW MEDS BY RX/DR IN RCRD: CPT | Performed by: INTERNAL MEDICINE

## 2024-06-06 PROCEDURE — 1159F MED LIST DOCD IN RCRD: CPT | Performed by: INTERNAL MEDICINE

## 2024-06-06 PROCEDURE — 1158F ADVNC CARE PLAN TLK DOCD: CPT | Performed by: INTERNAL MEDICINE

## 2024-06-06 PROCEDURE — 1157F ADVNC CARE PLAN IN RCRD: CPT | Performed by: INTERNAL MEDICINE

## 2024-06-06 PROCEDURE — 3077F SYST BP >= 140 MM HG: CPT | Performed by: INTERNAL MEDICINE

## 2024-06-06 PROCEDURE — 1036F TOBACCO NON-USER: CPT | Performed by: INTERNAL MEDICINE

## 2024-06-06 PROCEDURE — 1123F ACP DISCUSS/DSCN MKR DOCD: CPT | Performed by: INTERNAL MEDICINE

## 2024-06-06 RX ORDER — CALCITRIOL 0.5 UG/1
1 CAPSULE ORAL EVERY 24 HOURS
COMMUNITY

## 2024-06-06 RX ORDER — PRAVASTATIN SODIUM 20 MG/1
TABLET ORAL
Qty: 90 TABLET | Refills: 2 | Status: SHIPPED | OUTPATIENT
Start: 2024-06-06

## 2024-06-06 ASSESSMENT — PATIENT HEALTH QUESTIONNAIRE - PHQ9
2. FEELING DOWN, DEPRESSED OR HOPELESS: NOT AT ALL
SUM OF ALL RESPONSES TO PHQ9 QUESTIONS 1 AND 2: 0
1. LITTLE INTEREST OR PLEASURE IN DOING THINGS: NOT AT ALL

## 2024-06-06 NOTE — PROGRESS NOTES
"Subjective   Patient ID: Michael Szymanski is a 78 y.o. male who presents for Diabetes.    HPI   Did not get in to see PT or pain management since last visit. 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 Have had to adjust schedules to cover for them and he has been busy.  Still having left sided hip pain and pain in both shoulders.    Saw Dr. Brizuela last month. Average 30 day of 195 mg/dL, 47% in range.    Scheduled his colonoscopy but had to cancel because he didn't have a ride.    Does not check BP at home.    Review of Systems   All other systems reviewed and are negative.      Objective   /80   Pulse 55   Ht 1.664 m (5' 5.5\")   Wt 104 kg (229 lb)   BMI 37.53 kg/m²     Physical Exam  Constitutional:       Appearance: He is obese.   HENT:      Head: Normocephalic and atraumatic.   Cardiovascular:      Rate and Rhythm: Normal rate and regular rhythm.      Heart sounds: Normal heart sounds. No murmur heard.     No gallop.   Pulmonary:      Effort: Pulmonary effort is normal. No respiratory distress.      Breath sounds: No wheezing or rales.   Musculoskeletal:      Right lower leg: Edema present.      Left lower leg: Edema present.      Comments: Slow and cautious gait.  Uses walker to ambulate.  1+ pitting edema in LE bilaterally, wearing compression socks.   Skin:     General: Skin is warm and dry.      Findings: No rash.   Neurological:      Mental Status: He is alert and oriented to person, place, and time. Mental status is at baseline.   Psychiatric:         Mood and Affect: Mood normal.         Behavior: Behavior normal.         Assessment/Plan          #Low back pain, impaired mobility        -Evaluated at [facility] and was told he is not a surgical candidate        -CT lumbar 9/4/19: mod-severe OA       -ordered PT and pain management referrals at previous visit, gave patient numbers to schedule     #HTN  -high today but similar to May and improved from March. Cont amlodipine, " metoprolol and torsemide  -He does not want to start any new HTN meds   -No ACE/ARB due to borderline hyperkalemia     #HLD  -Cont pravastatin          #DM  -Follows with Dr. Brizuela. Cont glipizide, Lantus and SSI      #CKD stage 4  -Follows with Dr. Gonzalez. No ACE/ARB due to borderline hyperkalemia     #Klinefelter's syndrome, low testosterone, h/o prostate ca   -Follows with urology. cont testosterone supplementation      Colonoscopy: reordered  PSA: 2/26/24     RTC 6 mo       Lionel Alfaro, DO  Internal Medicine PGY-1

## 2024-06-06 NOTE — PATIENT INSTRUCTIONS
Call [phone] to schedule an appointment with ortho.     Physical Therapy main number [phone]  [facility] ([facility]) [phone]    [facility] Pain management (Dr. Caicedo, Dr. Granda, Dr. Warner and Dr. Shukla)--[phone]    Return to clinic in 6 months.

## 2024-07-29 ASSESSMENT — ENCOUNTER SYMPTOMS
COUGH: 0
DIARRHEA: 0
FREQUENCY: 0
APPETITE CHANGE: 0
SHORTNESS OF BREATH: 0
POLYDIPSIA: 0
ABDOMINAL PAIN: 0
NAUSEA: 0
SORE THROAT: 0
VOMITING: 0
ARTHRALGIAS: 0
HEADACHES: 0
CONSTIPATION: 0
NERVOUS/ANXIOUS: 0
FEVER: 0

## 2024-08-07 ENCOUNTER — APPOINTMENT (OUTPATIENT)
Dept: ENDOCRINOLOGY | Facility: CLINIC | Age: 79
End: 2024-08-07
Payer: MEDICARE

## 2024-08-15 ENCOUNTER — TELEPHONE (OUTPATIENT)
Dept: ENDOCRINOLOGY | Facility: CLINIC | Age: 79
End: 2024-08-15
Payer: MEDICARE
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
## 2024-08-22 ENCOUNTER — APPOINTMENT (OUTPATIENT)
Dept: ENDOCRINOLOGY | Facility: CLINIC | Age: 79
End: 2024-08-22
Payer: MEDICARE

## 2024-08-22 VITALS
SYSTOLIC BLOOD PRESSURE: 127 MMHG | WEIGHT: 234 LBS | HEART RATE: 58 BPM | HEIGHT: 68 IN | DIASTOLIC BLOOD PRESSURE: 71 MMHG | BODY MASS INDEX: 35.46 KG/M2

## 2024-08-22 DIAGNOSIS — E11.65 TYPE 2 DIABETES MELLITUS WITH HYPERGLYCEMIA, WITH LONG-TERM CURRENT USE OF INSULIN (MULTI): ICD-10-CM

## 2024-08-22 DIAGNOSIS — Z79.4 TYPE 2 DIABETES MELLITUS WITH HYPERGLYCEMIA, WITH LONG-TERM CURRENT USE OF INSULIN (MULTI): ICD-10-CM

## 2024-08-22 LAB — POC HEMOGLOBIN A1C: 8 % (ref 4.2–6.5)

## 2024-08-22 PROCEDURE — 99214 OFFICE O/P EST MOD 30 MIN: CPT | Performed by: INTERNAL MEDICINE

## 2024-08-22 PROCEDURE — 1126F AMNT PAIN NOTED NONE PRSNT: CPT | Performed by: INTERNAL MEDICINE

## 2024-08-22 PROCEDURE — 1159F MED LIST DOCD IN RCRD: CPT | Performed by: INTERNAL MEDICINE

## 2024-08-22 PROCEDURE — 3074F SYST BP LT 130 MM HG: CPT | Performed by: INTERNAL MEDICINE

## 2024-08-22 PROCEDURE — 3078F DIAST BP <80 MM HG: CPT | Performed by: INTERNAL MEDICINE

## 2024-08-22 PROCEDURE — 1036F TOBACCO NON-USER: CPT | Performed by: INTERNAL MEDICINE

## 2024-08-22 PROCEDURE — 1157F ADVNC CARE PLAN IN RCRD: CPT | Performed by: INTERNAL MEDICINE

## 2024-08-22 PROCEDURE — 1160F RVW MEDS BY RX/DR IN RCRD: CPT | Performed by: INTERNAL MEDICINE

## 2024-08-22 PROCEDURE — 83036 HEMOGLOBIN GLYCOSYLATED A1C: CPT | Performed by: INTERNAL MEDICINE

## 2024-08-22 RX ORDER — OMEPRAZOLE 20 MG/1
TABLET, ORALLY DISINTEGRATING, DELAYED RELEASE ORAL
COMMUNITY

## 2024-08-22 ASSESSMENT — ENCOUNTER SYMPTOMS
DEPRESSION: 0
LOSS OF SENSATION IN FEET: 0
CONSTIPATION: 0
FEVER: 0
DIARRHEA: 0
VOMITING: 0
FREQUENCY: 0
SORE THROAT: 0
HEADACHES: 0
COUGH: 0
NAUSEA: 0
NERVOUS/ANXIOUS: 1
SHORTNESS OF BREATH: 0
APPETITE CHANGE: 0
ABDOMINAL PAIN: 0
ARTHRALGIAS: 1
OCCASIONAL FEELINGS OF UNSTEADINESS: 0
MYALGIAS: 1
POLYDIPSIA: 0

## 2024-08-22 ASSESSMENT — PAIN SCALES - GENERAL: PAINLEVEL: 0-NO PAIN

## 2024-08-22 NOTE — LETTER
August 22, 2024     Robinson Ackerman DO  [id number] [facility] [address]  [address]  [place] [address]    Patient: Michael Szymanski   YOB: 1945   Date of Visit: 8/22/2024       Dear Dr. Robinson Ackerman DO:    Thank you for referring Michael Szymanski to me for evaluation. Below are my notes for this consultation.  If you have questions, please do not hesitate to call me. I look forward to following your patient along with you.       Sincerely,     Tye Brizuela MD      CC: No Recipients  ______________________________________________________________________________________    History Of Present Illness  Michael Szymanski is a 78 y.o. male     Duration of type 2 diabetes mellitus:  25 years  Complications:  chronic kidney disease     Weight stabilized     Lantus 65 units at bedtime  Humalog 8-10 units after meals when hyperglycemia alarm goes off  He does not know how do dose before meals.      He takes 8-9 units for hyperglycemia at 03-04     DexCom G67  Patient is testing glucose 1440 times daily  Records reviewed, on file     Last eye exam:  8/6/24    Past Medical History  He has a past medical history of Acute pansinusitis, unspecified (08/06/2019), Benign prostatic hyperplasia without lower urinary tract symptoms, Cellulitis of left lower limb (10/08/2021), Encounter for immunization (03/21/2017), Hyperlipidemia, unspecified (12/10/2015), Hypertensive chronic kidney disease with stage 1 through stage 4 chronic kidney disease, or unspecified chronic kidney disease (12/09/2021), Klinefelter syndrome, unspecified ([facility]), Localized edema (09/29/2021), Low back pain, unspecified (04/25/2018), Morbid (severe) obesity due to excess calories (Multi) (05/14/2020), Morbid (severe) obesity due to excess calories (Multi) (12/11/2021), Obesity, unspecified (08/07/2019), Other abnormal glucose, Other conditions influencing health status (03/12/2022), Other conditions influencing health status (03/12/2022), Other specified abnormal  findings of blood chemistry (02/13/2020), Other specified symptoms and signs involving the circulatory and respiratory systems (12/31/2018), Other symptoms and signs involving the musculoskeletal system (04/25/2018), Other tear of medial meniscus, current injury, unspecified knee, initial encounter, Personal history of diseases of the skin and subcutaneous tissue (08/07/2019), Personal history of other diseases of male genital organs, Personal history of other diseases of the circulatory system, Personal history of other diseases of the musculoskeletal system and connective tissue, Personal history of other diseases of the musculoskeletal system and connective tissue, Personal history of other diseases of the nervous system and sense organs, Personal history of other diseases of the respiratory system (06/25/2018), Personal history of other diseases of the respiratory system, Personal history of other diseases of urinary system (04/19/2017), Personal history of other endocrine, nutritional and metabolic disease, Personal history of other endocrine, nutritional and metabolic disease, Personal history of other endocrine, nutritional and metabolic disease, Personal history of other medical treatment, Personal history of other specified conditions (05/14/2020), Personal history of other specified conditions (10/21/2021), Personal history of other specified conditions (06/19/2013), Personal history of urinary (tract) infections (01/13/2022), Proteinuria, unspecified, Spondylosis without myelopathy or radiculopathy, cervical region, Sprain of ribs, initial encounter (09/09/2021), Sprain of ribs, initial encounter (09/09/2021), Strain of muscle, fascia and tendon of lower back, subsequent encounter (09/10/2019), Type 2 diabetes mellitus with mild nonproliferative diabetic retinopathy without macular edema, bilateral (Multi) (12/09/2021), and 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    Surgical History  He has a past surgical history that includes Tonsillectomy (05/29/2013); Gallbladder surgery (06/19/2013); Other surgical history (06/19/2013); Cataract extraction (06/19/2013); Colonoscopy (02/06/2017); Knee surgery (02/06/2017); Esophagogastroduodenoscopy (02/06/2017); Cystoscopy (02/06/2017); Cholecystectomy (02/06/2017); and Knee surgery (02/06/2017).     Social History  He reports that he has never smoked. He has never used smokeless tobacco. No history on file for alcohol use and drug use.    Family History  Family History   Problem Relation Name Age of Onset   • Pancreatic cancer Mother     • Diabetes Mother     • Heart disease Mother     • Kidney disease Father     • Hearing loss Brother     • Other (elevated psa) Brother          serum   • Other (cardiac disorder) Maternal Grandmother     • Other (cardiac disorder) Maternal Grandfather     • Other (cardiac disorder) Paternal Grandmother     • Other (cardiac disorder) Paternal Grandfather         Medications  Current Outpatient Medications   Medication Instructions   • acetaminophen (Tylenol) 325 mg tablet 1 tablet as needed   • amLODIPine (NORVASC) 10 mg, oral, Every 24 hours   • aspirin 81 mg chewable tablet 1 tablet, oral, Daily   • blood-glucose sensor (Dexcom G7 Sensor) device For continuous glucose monitoring.  Change every 10 days.   • calcitriol (Rocaltrol) 0.5 mcg capsule 1 capsule, Every 24 hours   • Dexcom G4 platinum  (Dexcom G7 ) misc For continuous glucose monitoring.   • FreeStyle Lite Strips strip For glucose testing 3 times daily   • insulin lispro (HumaLOG KwikPen Insulin) 100 unit/mL injection as directed   • Lantus U-100 Insulin 65 Units, subcutaneous, Nightly   • metoprolol succinate XL (TOPROL-XL) 50 mg, oral, Daily   • omega 3-dha-epa-fish oil (Fish OiL) 1,200 (144-216) mg capsule Fish Oil 1200 MG Oral Capsule  Refills: 0  Start: 6-May-2021   • omeprazole 20 mg  "tablet,disintegrat, delay rel oral   • pen needle, diabetic (Novofine 32) 32 gauge x 1/4\" needle 3-4 times daily   • pravastatin (Pravachol) 20 mg tablet take 1 tablet by mouth ONCE every 24 hours   • testosterone (ANDROGEL) 50 mg, transdermal, Daily   • torsemide (Demadex) 20 mg tablet 1 tablet, oral, Daily       Allergies  Cortisone and Penicillins    Review of Systems   Constitutional:  Negative for appetite change and fever.   HENT:  Negative for sore throat.         Denies dry mouth   Eyes:  Negative for visual disturbance.   Respiratory:  Negative for cough and shortness of breath.    Cardiovascular:  Negative for chest pain.   Gastrointestinal:  Negative for abdominal pain, constipation, diarrhea, nausea and vomiting.   Endocrine: Negative for polydipsia and polyuria.   Genitourinary:  Negative for frequency.   Musculoskeletal:  Positive for arthralgias and myalgias.   Skin:  Negative for rash.   Neurological:  Negative for headaches.   Psychiatric/Behavioral:  The patient is nervous/anxious.          Last Recorded Vitals  Blood pressure 127/71, pulse 58, height 1.727 m (5' 8\"), weight 106 kg (234 lb).    Physical Exam  Constitutional:       General: He is not in acute distress.  HENT:      Head: Normocephalic.   Eyes:      Extraocular Movements: Extraocular movements intact.   Cardiovascular:      Pulses:           Radial pulses are 2+ on the right side and 2+ on the left side.   Neurological:      Mental Status: He is alert.      Motor: No tremor.   Psychiatric:         Mood and Affect: Affect normal.         Cognition and Memory: Cognition normal.          Relevant Results  Glucose (mg/dL)   Date Value   05/14/2024 159 (H)   02/26/2024 140 (H)   12/18/2023 160 (H)     POC HEMOGLOBIN A1c (%)   Date Value   08/22/2024 8.0 (A)   01/25/2024 9.7 (A)   10/24/2023 9.3 (A)     Bicarbonate (mmol/L)   Date Value   05/14/2024 25   02/26/2024 23   12/18/2023 26     Urea Nitrogen (mg/dL)   Date Value   05/14/2024 48 " (H)   02/26/2024 62 (H)   12/18/2023 38 (H)     Creatinine (mg/dL)   Date Value   05/14/2024 2.84 (H)   02/26/2024 2.90 (H)   12/18/2023 3.03 (H)     Lab Results   Component Value Date    CHOL 88 02/26/2024    CHOL 161 01/03/2023    CHOL 171 06/08/2021     Lab Results   Component Value Date    HDL 34.4 02/26/2024    HDL 40.8 01/03/2023    HDL 34.9 (A) 06/08/2021     Lab Results   Component Value Date    LDLCALC 28 02/26/2024     Lab Results   Component Value Date    TRIG 126 02/26/2024    TRIG 160 (H) 01/03/2023    TRIG 315 (H) 06/08/2021     Lab Results   Component Value Date    TSH 1.25 05/20/2019       IMPRESSION  TYPE 2 DIABETES MELLITUS WITH HYPERGLYCEMIA  LONG TERM CURRENT INSULIN USE   Postprandial hyperglycemia  He is not taking Humalog prior to meals, rather taking it in reaction to postprandial hyperglycemia  Less frequent hypoglycemia    Patient is adhering to and benefitting from continuous glucose monitoring.       RECOMMENDATIONS  Lantus 62 units at bedtime     Humalog BEFORE MEALS ONLY   None If glucose under 100 mg/dl  Take 4 units if glucose 100-150 mg/dl, add 1 unit for every 50 over glucose 101 mg/dl      FOR COLONOSCOPY  Take Lantus 35 units the night before the prep and the night before procedure  Hold Humalog during day of prep.        Follow up 3 months

## 2024-08-22 NOTE — PROGRESS NOTES
History Of Present Illness  Michael Szymanski is a 78 y.o. male     Duration of type 2 diabetes mellitus:  25 years  Complications:  chronic kidney disease     Weight stabilized     Lantus 65 units at bedtime  Humalog 8-10 units after meals when hyperglycemia alarm goes off  He does not know how do dose before meals.      He takes 8-9 units for hyperglycemia at 03-04     DexCom G67  Patient is testing glucose 1440 times daily  Records reviewed, on file     Last eye exam:  8/6/24    Past Medical History  He has a past medical history of Acute pansinusitis, unspecified (08/06/2019), Benign prostatic hyperplasia without lower urinary tract symptoms, Cellulitis of left lower limb (10/08/2021), Encounter for immunization (03/21/2017), Hyperlipidemia, unspecified (12/10/2015), Hypertensive chronic kidney disease with stage 1 through stage 4 chronic kidney disease, or unspecified chronic kidney disease (12/09/2021), Klinefelter syndrome, unspecified ([facility]), Localized edema (09/29/2021), Low back pain, unspecified (04/25/2018), Morbid (severe) obesity due to excess calories (Multi) (05/14/2020), Morbid (severe) obesity due to excess calories (Multi) (12/11/2021), Obesity, unspecified (08/07/2019), Other abnormal glucose, Other conditions influencing health status (03/12/2022), Other conditions influencing health status (03/12/2022), Other specified abnormal findings of blood chemistry (02/13/2020), Other specified symptoms and signs involving the circulatory and respiratory systems (12/31/2018), Other symptoms and signs involving the musculoskeletal system (04/25/2018), Other tear of medial meniscus, current injury, unspecified knee, initial encounter, Personal history of diseases of the skin and subcutaneous tissue (08/07/2019), Personal history of other diseases of male genital organs, Personal history of other diseases of the circulatory system, Personal history of other diseases of the musculoskeletal system and  connective tissue, Personal history of other diseases of the musculoskeletal system and connective tissue, Personal history of other diseases of the nervous system and sense organs, Personal history of other diseases of the respiratory system (06/25/2018), Personal history of other diseases of the respiratory system, Personal history of other diseases of urinary system (04/19/2017), Personal history of other endocrine, nutritional and metabolic disease, Personal history of other endocrine, nutritional and metabolic disease, Personal history of other endocrine, nutritional and metabolic disease, Personal history of other medical treatment, Personal history of other specified conditions (05/14/2020), Personal history of other specified conditions (10/21/2021), Personal history of other specified conditions (06/19/2013), Personal history of urinary (tract) infections (01/13/2022), Proteinuria, unspecified, Spondylosis without myelopathy or radiculopathy, cervical region, Sprain of ribs, initial encounter (09/09/2021), Sprain of ribs, initial encounter (09/09/2021), Strain of muscle, fascia and tendon of lower back, subsequent encounter (09/10/2019), Type 2 diabetes mellitus with mild nonproliferative diabetic retinopathy without macular edema, bilateral (Multi) (12/09/2021), and 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    Surgical History  He has a past surgical history that includes Tonsillectomy (05/29/2013); Gallbladder surgery (06/19/2013); Other surgical history (06/19/2013); Cataract extraction (06/19/2013); Colonoscopy (02/06/2017); Knee surgery (02/06/2017); Esophagogastroduodenoscopy (02/06/2017); Cystoscopy (02/06/2017); Cholecystectomy (02/06/2017); and Knee surgery (02/06/2017).     Social History  He reports that he has never smoked. He has never used smokeless tobacco. No history on file for alcohol use and drug use.    Family  "History  Family History   Problem Relation Name Age of Onset    Pancreatic cancer Mother      Diabetes Mother      Heart disease Mother      Kidney disease Father      Hearing loss Brother      Other (elevated psa) Brother          serum    Other (cardiac disorder) Maternal Grandmother      Other (cardiac disorder) Maternal Grandfather      Other (cardiac disorder) Paternal Grandmother      Other (cardiac disorder) Paternal Grandfather         Medications  Current Outpatient Medications   Medication Instructions    acetaminophen (Tylenol) 325 mg tablet 1 tablet as needed    amLODIPine (NORVASC) 10 mg, oral, Every 24 hours    aspirin 81 mg chewable tablet 1 tablet, oral, Daily    blood-glucose sensor (Dexcom G7 Sensor) device For continuous glucose monitoring.  Change every 10 days.    calcitriol (Rocaltrol) 0.5 mcg capsule 1 capsule, Every 24 hours    Dexcom G4 platinum  (Dexcom G7 ) misc For continuous glucose monitoring.    FreeStyle Lite Strips strip For glucose testing 3 times daily    insulin lispro (HumaLOG KwikPen Insulin) 100 unit/mL injection as directed    Lantus U-100 Insulin 65 Units, subcutaneous, Nightly    metoprolol succinate XL (TOPROL-XL) 50 mg, oral, Daily    omega 3-dha-epa-fish oil (Fish OiL) 1,200 (144-216) mg capsule Fish Oil 1200 MG Oral Capsule  Refills: 0  Start: 6-May-2021    omeprazole 20 mg tablet,disintegrat, delay rel oral    pen needle, diabetic (Novofine 32) 32 gauge x 1/4\" needle 3-4 times daily    pravastatin (Pravachol) 20 mg tablet take 1 tablet by mouth ONCE every 24 hours    testosterone (ANDROGEL) 50 mg, transdermal, Daily    torsemide (Demadex) 20 mg tablet 1 tablet, oral, Daily       Allergies  Cortisone and Penicillins    Review of Systems   Constitutional:  Negative for appetite change and fever.   HENT:  Negative for sore throat.         Denies dry mouth   Eyes:  Negative for visual disturbance.   Respiratory:  Negative for cough and shortness of breath.  " "  Cardiovascular:  Negative for chest pain.   Gastrointestinal:  Negative for abdominal pain, constipation, diarrhea, nausea and vomiting.   Endocrine: Negative for polydipsia and polyuria.   Genitourinary:  Negative for frequency.   Musculoskeletal:  Positive for arthralgias and myalgias.   Skin:  Negative for rash.   Neurological:  Negative for headaches.   Psychiatric/Behavioral:  The patient is nervous/anxious.          Last Recorded Vitals  Blood pressure 127/71, pulse 58, height 1.727 m (5' 8\"), weight 106 kg (234 lb).    Physical Exam  Constitutional:       General: He is not in acute distress.  HENT:      Head: Normocephalic.   Eyes:      Extraocular Movements: Extraocular movements intact.   Cardiovascular:      Pulses:           Radial pulses are 2+ on the right side and 2+ on the left side.   Neurological:      Mental Status: He is alert.      Motor: No tremor.   Psychiatric:         Mood and Affect: Affect normal.         Cognition and Memory: Cognition normal.          Relevant Results  Glucose (mg/dL)   Date Value   05/14/2024 159 (H)   02/26/2024 140 (H)   12/18/2023 160 (H)     POC HEMOGLOBIN A1c (%)   Date Value   08/22/2024 8.0 (A)   01/25/2024 9.7 (A)   10/24/2023 9.3 (A)     Bicarbonate (mmol/L)   Date Value   05/14/2024 25   02/26/2024 23   12/18/2023 26     Urea Nitrogen (mg/dL)   Date Value   05/14/2024 48 (H)   02/26/2024 62 (H)   12/18/2023 38 (H)     Creatinine (mg/dL)   Date Value   05/14/2024 2.84 (H)   02/26/2024 2.90 (H)   12/18/2023 3.03 (H)     Lab Results   Component Value Date    CHOL 88 02/26/2024    CHOL 161 01/03/2023    CHOL 171 06/08/2021     Lab Results   Component Value Date    HDL 34.4 02/26/2024    HDL 40.8 01/03/2023    HDL 34.9 (A) 06/08/2021     Lab Results   Component Value Date    LDLCALC 28 02/26/2024     Lab Results   Component Value Date    TRIG 126 02/26/2024    TRIG 160 (H) 01/03/2023    TRIG 315 (H) 06/08/2021     Lab Results   Component Value Date    TSH 1.25 " 05/20/2019       IMPRESSION  TYPE 2 DIABETES MELLITUS WITH HYPERGLYCEMIA  LONG TERM CURRENT INSULIN USE   Postprandial hyperglycemia  He is not taking Humalog prior to meals, rather taking it in reaction to postprandial hyperglycemia  Less frequent hypoglycemia    Patient is adhering to and benefitting from continuous glucose monitoring.       RECOMMENDATIONS  Lantus 62 units at bedtime     Humalog BEFORE MEALS ONLY   None If glucose under 100 mg/dl  Take 4 units if glucose 100-150 mg/dl, add 1 unit for every 50 over glucose 101 mg/dl      FOR COLONOSCOPY  Take Lantus 35 units the night before the prep and the night before procedure  Hold Humalog during day of prep.        Follow up 3 months

## 2024-08-22 NOTE — PATIENT INSTRUCTIONS
RECOMMENDATIONS  Lantus 62 units at bedtime     Humalog BEFORE MEALS ONLY   None If glucose under 100 mg/dl  Take 4 units if glucose 100-150 mg/dl, add 1 unit for every 50 over glucose 101 mg/dl      FOR COLONOSCOPY  Take Lantus 35 units the night before the prep and the night before procedure  Hold Humalog during day of prep.        Follow up 3 months

## 2024-09-04 DIAGNOSIS — E29.1 HYPOGONADISM IN MALE: Primary | ICD-10-CM

## 2024-09-04 RX ORDER — TESTOSTERONE GEL, 1% 10 MG/G
50 GEL TRANSDERMAL DAILY
Qty: 30 PACKET | Refills: 5 | Status: SHIPPED | OUTPATIENT
Start: 2024-09-04 | End: 2024-10-04

## 2024-09-12 ENCOUNTER — APPOINTMENT (OUTPATIENT)
Dept: OPERATING ROOM | Facility: HOSPITAL | Age: 79
End: 2024-09-12
Payer: MEDICARE

## 2024-09-19 ENCOUNTER — TELEPHONE (OUTPATIENT)
Dept: ENDOCRINOLOGY | Facility: CLINIC | Age: 79
End: 2024-09-19
Payer: MEDICARE

## 2024-09-19 DIAGNOSIS — E11.65 TYPE 2 DIABETES MELLITUS WITH HYPERGLYCEMIA, WITH LONG-TERM CURRENT USE OF INSULIN: ICD-10-CM

## 2024-09-19 DIAGNOSIS — Z79.4 TYPE 2 DIABETES MELLITUS WITH HYPERGLYCEMIA, WITH LONG-TERM CURRENT USE OF INSULIN: ICD-10-CM

## 2024-09-19 RX ORDER — INSULIN GLARGINE 100 [IU]/ML
62-65 INJECTION, SOLUTION SUBCUTANEOUS NIGHTLY
Qty: 60 ML | Refills: 3 | Status: SHIPPED | OUTPATIENT
Start: 2024-09-19

## 2024-09-19 NOTE — TELEPHONE ENCOUNTER
Called and LM with the below details and office contact details     ----- Message from Tye Brizuela sent at 9/19/2024  2:13 PM EDT -----  Regarding: RE: Medication request/question  Rx sent.  He should not miss any days.  ----- Message -----  From: Gissell Pink MA  Sent: 9/19/2024  12:55 PM EDT  To: Tye Brizuela MD  Subject: Medication request/question                      Pt is requesting Lantus from [facility] in [place] or asking if he can miss several dosages of his Lantus. He stated he runs out tonight but the pharmacy can not fill his current script until next week on Monday and told him this is due to his insurance.   # [phone]    Thank you,   Gissell

## 2024-09-19 NOTE — TELEPHONE ENCOUNTER
Pt is requesting Lantus from [facility] in [place] or asking if he can miss several dosages of his Lantus. He stated he runs out tonight but the pharmacy can not fill his current script until next week on Monday and told him this is due to his insurance.   # [phone]

## 2024-10-04 ENCOUNTER — OFFICE VISIT (OUTPATIENT)
Dept: PRIMARY CARE | Facility: CLINIC | Age: 79
End: 2024-10-04
Payer: MEDICARE

## 2024-10-04 ENCOUNTER — HOSPITAL ENCOUNTER (OUTPATIENT)
Dept: VASCULAR MEDICINE | Facility: HOSPITAL | Age: 79
Discharge: HOME | End: 2024-10-04
Payer: MEDICARE

## 2024-10-04 VITALS — OXYGEN SATURATION: 97 % | SYSTOLIC BLOOD PRESSURE: 159 MMHG | HEART RATE: 72 BPM | DIASTOLIC BLOOD PRESSURE: 85 MMHG

## 2024-10-04 DIAGNOSIS — M79.89 RIGHT LEG SWELLING: ICD-10-CM

## 2024-10-04 DIAGNOSIS — E11.65 TYPE 2 DIABETES MELLITUS WITH HYPERGLYCEMIA, WITH LONG-TERM CURRENT USE OF INSULIN: Primary | ICD-10-CM

## 2024-10-04 DIAGNOSIS — Z79.4 TYPE 2 DIABETES MELLITUS WITH HYPERGLYCEMIA, WITH LONG-TERM CURRENT USE OF INSULIN: Primary | ICD-10-CM

## 2024-10-04 DIAGNOSIS — L03.115 CELLULITIS OF RIGHT LOWER EXTREMITY: Primary | ICD-10-CM

## 2024-10-04 PROCEDURE — 93971 EXTREMITY STUDY: CPT

## 2024-10-04 PROCEDURE — 99214 OFFICE O/P EST MOD 30 MIN: CPT | Performed by: INTERNAL MEDICINE

## 2024-10-04 PROCEDURE — 3077F SYST BP >= 140 MM HG: CPT | Performed by: INTERNAL MEDICINE

## 2024-10-04 PROCEDURE — 1159F MED LIST DOCD IN RCRD: CPT | Performed by: INTERNAL MEDICINE

## 2024-10-04 PROCEDURE — 93971 EXTREMITY STUDY: CPT | Performed by: INTERNAL MEDICINE

## 2024-10-04 PROCEDURE — 1157F ADVNC CARE PLAN IN RCRD: CPT | Performed by: INTERNAL MEDICINE

## 2024-10-04 PROCEDURE — 3079F DIAST BP 80-89 MM HG: CPT | Performed by: INTERNAL MEDICINE

## 2024-10-04 RX ORDER — DOXYCYCLINE 100 MG/1
100 CAPSULE ORAL 2 TIMES DAILY
Qty: 14 CAPSULE | Refills: 0 | Status: SHIPPED | OUTPATIENT
Start: 2024-10-04 | End: 2024-10-11

## 2024-10-04 RX ORDER — INSULIN LISPRO 100 [IU]/ML
4-10 INJECTION, SOLUTION INTRAVENOUS; SUBCUTANEOUS
Qty: 30 ML | Refills: 3 | Status: SHIPPED | OUTPATIENT
Start: 2024-10-04

## 2024-10-04 NOTE — PROGRESS NOTES
"Subjective   Patient ID: Michael Szymanski is a 78 y.o. male with pmhx of T2DM on insulin, who presents for Leg Swelling (Foot/ankle swelling, pain, weeping wound ).    HPI   A \"week or so ago\" patient started noticing some blood and pus on the outside of his lower right leg. Over the last few days, his leg has become more red and his toes have swollen more. This morning the top of his foot has swollen. Describes his leg as \"uncomfortable\" but less painful than his back.    Does not remember any trauma to his leg. Has had cellulitis of the left leg in the past.    Denies fevers, chills, night sweats.  Denies CP or SOB.    Review of Systems   All other systems reviewed and are negative.    Objective   /85   Pulse 72   SpO2 97%     Physical Exam  Vitals reviewed.   Constitutional:       General: He is not in acute distress.     Appearance: He is not ill-appearing.   Cardiovascular:      Rate and Rhythm: Normal rate and regular rhythm.      Pulses: Normal pulses.      Heart sounds: Normal heart sounds.   Pulmonary:      Effort: Pulmonary effort is normal.      Breath sounds: Normal breath sounds.   Musculoskeletal:      Comments: Moving digits of right foot. Severely reduced ROM of right ankle.   Skin:     Comments: Lesion consisting of sloughed skin weeping minute amount of serosanginuous fluid over lateral aspect of distal right lower extremity. Pitting edema and erythema of foot, ankle and right lower leg. Not acutely tender to palpation.   Neurological:      Mental Status: He is alert.   Psychiatric:         Mood and Affect: Mood normal.         Behavior: Behavior normal.       Assessment/Plan   #RLE cellulitis  #RLE swelling  -Rx doxycycline for 7 days  -STAT Rt LE venous duplex  -wound care clinic referral       Lionel Alfaro DO  Internal Medicine PGY-2  I saw and evaluated the patient. I personally obtained the key and critical portions of the history and physical exam or was physically present for key and " critical portions performed by the resident/fellow. I reviewed the resident/fellow's documentation and discussed the patient with the resident/fellow. I agree with the resident/fellow's medical decision making as documented in the note.    Robinson Ackerman, DO

## 2024-10-04 NOTE — PATIENT INSTRUCTIONS
Take your doxycycline (antibiotic) for 7 days.    Call if your leg gets worse or does not starting to improve by the time you complete your antibiotics.    We will call wound care clinic to get you an appointment.    Go to the [facility] now to get your leg ultrasound.

## 2024-10-07 ENCOUNTER — APPOINTMENT (OUTPATIENT)
Dept: DERMATOLOGY | Facility: CLINIC | Age: 79
End: 2024-10-07
Payer: MEDICARE

## 2024-10-15 ENCOUNTER — OFFICE VISIT (OUTPATIENT)
Dept: WOUND CARE | Facility: HOSPITAL | Age: 79
End: 2024-10-15
Payer: MEDICARE

## 2024-10-15 DIAGNOSIS — L97.919 VENOUS ULCER OF RIGHT LEG (MULTI): Primary | ICD-10-CM

## 2024-10-15 DIAGNOSIS — I83.019 VENOUS ULCER OF RIGHT LEG (MULTI): Primary | ICD-10-CM

## 2024-10-15 DIAGNOSIS — L03.115 CELLULITIS OF RIGHT LOWER EXTREMITY: ICD-10-CM

## 2024-10-15 PROCEDURE — 87205 SMEAR GRAM STAIN: CPT | Mod: GEALAB | Performed by: STUDENT IN AN ORGANIZED HEALTH CARE EDUCATION/TRAINING PROGRAM

## 2024-10-15 PROCEDURE — 11045 DBRDMT SUBQ TISS EACH ADDL: CPT

## 2024-10-15 PROCEDURE — 11042 DBRDMT SUBQ TIS 1ST 20SQCM/<: CPT

## 2024-10-15 PROCEDURE — 99213 OFFICE O/P EST LOW 20 MIN: CPT | Mod: 25

## 2024-10-15 PROCEDURE — 87077 CULTURE AEROBIC IDENTIFY: CPT | Mod: GEALAB | Performed by: STUDENT IN AN ORGANIZED HEALTH CARE EDUCATION/TRAINING PROGRAM

## 2024-10-15 RX ORDER — DOXYCYCLINE 100 MG/1
100 CAPSULE ORAL 2 TIMES DAILY
Qty: 20 CAPSULE | Refills: 0 | Status: SHIPPED | OUTPATIENT
Start: 2024-10-15 | End: 2024-10-25

## 2024-10-19 LAB
BACTERIA SPEC CULT: ABNORMAL
BACTERIA SPEC CULT: ABNORMAL
GRAM STN SPEC: ABNORMAL
GRAM STN SPEC: ABNORMAL

## 2024-10-22 ENCOUNTER — OFFICE VISIT (OUTPATIENT)
Dept: WOUND CARE | Facility: HOSPITAL | Age: 79
End: 2024-10-22
Payer: MEDICARE

## 2024-10-22 DIAGNOSIS — L03.115 CELLULITIS OF RIGHT LEG: Primary | ICD-10-CM

## 2024-10-22 PROCEDURE — 11042 DBRDMT SUBQ TIS 1ST 20SQCM/<: CPT

## 2024-10-22 RX ORDER — DOXYCYCLINE 100 MG/1
100 CAPSULE ORAL 2 TIMES DAILY
Qty: 20 CAPSULE | Refills: 0 | Status: SHIPPED | OUTPATIENT
Start: 2024-10-22 | End: 2024-11-01

## 2024-10-25 ENCOUNTER — TELEPHONE (OUTPATIENT)
Dept: PRIMARY CARE | Facility: CLINIC | Age: 79
End: 2024-10-25
Payer: MEDICARE

## 2024-10-25 ENCOUNTER — TELEPHONE (OUTPATIENT)
Dept: ENDOCRINOLOGY | Facility: CLINIC | Age: 79
End: 2024-10-25
Payer: MEDICARE

## 2024-10-25 NOTE — TELEPHONE ENCOUNTER
----- Message from Robinson Akcerman sent at 10/24/2024  3:25 PM EDT -----  Needs to decrease his Lantus by 50%he night prior to scope. No short acting the morning of colonoscpy. He can resume meds to normal the evneing of scope.  ----- Message -----  From: Sharon Britton RN  Sent: 10/24/2024   3:17 PM EDT  To: Robinson Ackerman,     Patient has questions about his diabetic medication and upcoming colonoscopy . He is worried about sugars

## 2024-10-25 NOTE — TELEPHONE ENCOUNTER
Pt called and LM requesting return call to review the prep for his colonoscopy next week.     Returned pt call and provided the details as stated in the office note from 8/22/24: Take Lantus 35 units the night before the prep and the night before procedure. Hold Humalog during day of prep.   Information provided 3 times and reminder of written instructions in last visit summary 8/22/24. Provided office contact information.

## 2024-10-29 ENCOUNTER — APPOINTMENT (OUTPATIENT)
Dept: OPERATING ROOM | Facility: HOSPITAL | Age: 79
End: 2024-10-29
Payer: MEDICARE

## 2024-11-05 ENCOUNTER — OFFICE VISIT (OUTPATIENT)
Dept: WOUND CARE | Facility: HOSPITAL | Age: 79
End: 2024-11-05
Payer: MEDICARE

## 2024-11-05 DIAGNOSIS — I73.9 PERIPHERAL VASCULAR DISEASE (CMS-HCC): Primary | ICD-10-CM

## 2024-11-05 PROCEDURE — 99212 OFFICE O/P EST SF 10 MIN: CPT

## 2024-11-12 DIAGNOSIS — N18.4 CHRONIC KIDNEY DISEASE, STAGE 4 (SEVERE) (MULTI): Primary | ICD-10-CM

## 2024-11-12 DIAGNOSIS — N25.81 SECONDARY HYPERPARATHYROIDISM OF RENAL ORIGIN (MULTI): ICD-10-CM

## 2024-11-19 ENCOUNTER — LAB (OUTPATIENT)
Dept: LAB | Facility: LAB | Age: 79
End: 2024-11-19
Payer: MEDICARE

## 2024-11-19 DIAGNOSIS — N40.0 BENIGN PROSTATIC HYPERPLASIA, UNSPECIFIED WHETHER LOWER URINARY TRACT SYMPTOMS PRESENT: ICD-10-CM

## 2024-11-19 DIAGNOSIS — C61 PROSTATE CANCER (MULTI): ICD-10-CM

## 2024-11-19 DIAGNOSIS — N18.4 CHRONIC KIDNEY DISEASE, STAGE 4 (SEVERE) (MULTI): ICD-10-CM

## 2024-11-19 DIAGNOSIS — R80.9 ALBUMINURIA: ICD-10-CM

## 2024-11-19 DIAGNOSIS — E29.1 HYPOGONADISM IN MALE: ICD-10-CM

## 2024-11-19 DIAGNOSIS — N25.81 SECONDARY HYPERPARATHYROIDISM OF RENAL ORIGIN (MULTI): ICD-10-CM

## 2024-11-19 LAB
ALBUMIN SERPL BCP-MCNC: 3.7 G/DL (ref 3.4–5)
ANION GAP SERPL CALC-SCNC: 21 MMOL/L (ref 10–20)
BUN SERPL-MCNC: 43 MG/DL (ref 6–23)
CALCIUM SERPL-MCNC: 9.6 MG/DL (ref 8.6–10.3)
CHLORIDE SERPL-SCNC: 102 MMOL/L (ref 98–107)
CO2 SERPL-SCNC: 23 MMOL/L (ref 21–32)
CREAT SERPL-MCNC: 3.36 MG/DL (ref 0.5–1.3)
EGFRCR SERPLBLD CKD-EPI 2021: 18 ML/MIN/1.73M*2
ERYTHROCYTE [DISTWIDTH] IN BLOOD BY AUTOMATED COUNT: 13.8 % (ref 11.5–14.5)
GLUCOSE SERPL-MCNC: 79 MG/DL (ref 74–99)
HCT VFR BLD AUTO: 46.3 % (ref 41–52)
HGB BLD-MCNC: 14 G/DL (ref 13.5–17.5)
MCH RBC QN AUTO: 30.5 PG (ref 26–34)
MCHC RBC AUTO-ENTMCNC: 30.2 G/DL (ref 32–36)
MCV RBC AUTO: 101 FL (ref 80–100)
NRBC BLD-RTO: 0 /100 WBCS (ref 0–0)
PHOSPHATE SERPL-MCNC: 4.6 MG/DL (ref 2.5–4.9)
PLATELET # BLD AUTO: 294 X10*3/UL (ref 150–450)
POTASSIUM SERPL-SCNC: 5 MMOL/L (ref 3.5–5.3)
RBC # BLD AUTO: 4.59 X10*6/UL (ref 4.5–5.9)
SODIUM SERPL-SCNC: 141 MMOL/L (ref 136–145)
WBC # BLD AUTO: 7.5 X10*3/UL (ref 4.4–11.3)

## 2024-11-19 PROCEDURE — 83970 ASSAY OF PARATHORMONE: CPT

## 2024-11-19 PROCEDURE — 84402 ASSAY OF FREE TESTOSTERONE: CPT

## 2024-11-19 PROCEDURE — 85027 COMPLETE CBC AUTOMATED: CPT

## 2024-11-19 PROCEDURE — 84153 ASSAY OF PSA TOTAL: CPT

## 2024-11-19 PROCEDURE — 80069 RENAL FUNCTION PANEL: CPT

## 2024-11-19 PROCEDURE — 36415 COLL VENOUS BLD VENIPUNCTURE: CPT

## 2024-11-20 LAB
PSA SERPL-MCNC: <0.1 NG/ML
PTH-INTACT SERPL-MCNC: 14.4 PG/ML (ref 18.5–88)

## 2024-11-24 LAB
TESTOSTERONE FREE (CHAN): 76.3 PG/ML (ref 30–135)
TESTOSTERONE,TOTAL,LC-MS/MS: 502 NG/DL (ref 250–1100)

## 2024-12-04 ENCOUNTER — APPOINTMENT (OUTPATIENT)
Dept: ENDOCRINOLOGY | Facility: CLINIC | Age: 79
End: 2024-12-04
Payer: MEDICARE

## 2024-12-04 VITALS
WEIGHT: 232.2 LBS | HEART RATE: 61 BPM | BODY MASS INDEX: 35.31 KG/M2 | SYSTOLIC BLOOD PRESSURE: 151 MMHG | DIASTOLIC BLOOD PRESSURE: 79 MMHG

## 2024-12-04 DIAGNOSIS — Z79.4 TYPE 2 DIABETES MELLITUS WITH HYPERGLYCEMIA, WITH LONG-TERM CURRENT USE OF INSULIN: ICD-10-CM

## 2024-12-04 DIAGNOSIS — E11.65 TYPE 2 DIABETES MELLITUS WITH HYPERGLYCEMIA, WITH LONG-TERM CURRENT USE OF INSULIN: ICD-10-CM

## 2024-12-04 LAB — POC HEMOGLOBIN A1C: 7.9 % (ref 4.2–6.5)

## 2024-12-04 PROCEDURE — 83036 HEMOGLOBIN GLYCOSYLATED A1C: CPT | Performed by: INTERNAL MEDICINE

## 2024-12-04 PROCEDURE — G2211 COMPLEX E/M VISIT ADD ON: HCPCS | Performed by: INTERNAL MEDICINE

## 2024-12-04 PROCEDURE — 1160F RVW MEDS BY RX/DR IN RCRD: CPT | Performed by: INTERNAL MEDICINE

## 2024-12-04 PROCEDURE — 1159F MED LIST DOCD IN RCRD: CPT | Performed by: INTERNAL MEDICINE

## 2024-12-04 PROCEDURE — 3078F DIAST BP <80 MM HG: CPT | Performed by: INTERNAL MEDICINE

## 2024-12-04 PROCEDURE — 3077F SYST BP >= 140 MM HG: CPT | Performed by: INTERNAL MEDICINE

## 2024-12-04 PROCEDURE — 99214 OFFICE O/P EST MOD 30 MIN: CPT | Performed by: INTERNAL MEDICINE

## 2024-12-04 PROCEDURE — 1036F TOBACCO NON-USER: CPT | Performed by: INTERNAL MEDICINE

## 2024-12-04 PROCEDURE — 1125F AMNT PAIN NOTED PAIN PRSNT: CPT | Performed by: INTERNAL MEDICINE

## 2024-12-04 PROCEDURE — 1157F ADVNC CARE PLAN IN RCRD: CPT | Performed by: INTERNAL MEDICINE

## 2024-12-04 ASSESSMENT — ENCOUNTER SYMPTOMS
DEPRESSION: 1
APPETITE CHANGE: 0
FREQUENCY: 0
VOMITING: 0
POLYDIPSIA: 0
BACK PAIN: 1
SHORTNESS OF BREATH: 0
NAUSEA: 0
ARTHRALGIAS: 1
CONSTIPATION: 0
NERVOUS/ANXIOUS: 1
HEADACHES: 0
COUGH: 0
SORE THROAT: 0
DIARRHEA: 0
DYSPHORIC MOOD: 1
ABDOMINAL PAIN: 0
FEVER: 0

## 2024-12-04 ASSESSMENT — PATIENT HEALTH QUESTIONNAIRE - PHQ9
10. IF YOU CHECKED OFF ANY PROBLEMS, HOW DIFFICULT HAVE THESE PROBLEMS MADE IT FOR YOU TO DO YOUR WORK, TAKE CARE OF THINGS AT HOME, OR GET ALONG WITH OTHER PEOPLE: SOMEWHAT DIFFICULT
SUM OF ALL RESPONSES TO PHQ9 QUESTIONS 1 AND 2: 2
1. LITTLE INTEREST OR PLEASURE IN DOING THINGS: SEVERAL DAYS
2. FEELING DOWN, DEPRESSED OR HOPELESS: SEVERAL DAYS

## 2024-12-04 ASSESSMENT — PAIN SCALES - GENERAL: PAINLEVEL_OUTOF10: 2

## 2024-12-04 NOTE — PATIENT INSTRUCTIONS
A1c 7.9%    RECOMMENDATIONS  Continue Lantus 62 units at bedtime     Humalog BEFORE MEALS ONLY   None If glucose under 100 mg/dl  Take 4 units if glucose 100-150 mg/dl, add 1 unit for every 50 over glucose 101 mg/dl        FOR COLONOSCOPY  Take Lantus 35 units the night before the prep and the night before procedure  Hold Humalog during day of prep.     Follow up 3 months

## 2024-12-04 NOTE — PROGRESS NOTES
History Of Present Illness  Michael Szymanski is a 79 y.o. male     Duration of type 2 diabetes mellitus:  25 years  Complications:  chronic kidney disease    Right leg wound, discharged from wound care clinic.  He no longer requires wraps.    Lantus 62 units at bedtime     Humalog still taking after meals, 6-8 units  He has difficulty choosing a dose before eating    DexCom G7  Patient is testing glucose 1440 times daily  Records reviewed, on file  Stated some AM hypoglycemia     Last eye exam:  8/6/24    Advised I received communication from his sister who was concerned about his self care at home.  Patient denies any need for assistance with home care.     Past Medical History  He has a past medical history of Acute pansinusitis, unspecified (08/06/2019), Benign prostatic hyperplasia without lower urinary tract symptoms, Cellulitis of left lower limb (10/08/2021), Encounter for immunization (03/21/2017), Hyperlipidemia, unspecified (12/10/2015), Hypertensive chronic kidney disease with stage 1 through stage 4 chronic kidney disease, or unspecified chronic kidney disease (12/09/2021), Klinefelter syndrome, unspecified ([facility]), Localized edema (09/29/2021), Low back pain, unspecified (04/25/2018), Morbid (severe) obesity due to excess calories (Multi) (05/14/2020), Morbid (severe) obesity due to excess calories (Multi) (12/11/2021), Obesity, unspecified (08/07/2019), Other abnormal glucose, Other conditions influencing health status (03/12/2022), Other conditions influencing health status (03/12/2022), Other specified abnormal findings of blood chemistry (02/13/2020), Other specified symptoms and signs involving the circulatory and respiratory systems (12/31/2018), Other symptoms and signs involving the musculoskeletal system (04/25/2018), Other tear of medial meniscus, current injury, unspecified knee, initial encounter, Personal history of diseases of the skin and subcutaneous tissue (08/07/2019), Personal history  of other diseases of male genital organs, Personal history of other diseases of the circulatory system, Personal history of other diseases of the musculoskeletal system and connective tissue, Personal history of other diseases of the musculoskeletal system and connective tissue, Personal history of other diseases of the nervous system and sense organs, Personal history of other diseases of the respiratory system (06/25/2018), Personal history of other diseases of the respiratory system, Personal history of other diseases of urinary system (04/19/2017), Personal history of other endocrine, nutritional and metabolic disease, Personal history of other endocrine, nutritional and metabolic disease, Personal history of other endocrine, nutritional and metabolic disease, Personal history of other medical treatment, Personal history of other specified conditions (05/14/2020), Personal history of other specified conditions (10/21/2021), Personal history of other specified conditions (06/19/2013), Personal history of urinary (tract) infections (01/13/2022), Proteinuria, unspecified, Spondylosis without myelopathy or radiculopathy, cervical region, Sprain of ribs, initial encounter (09/09/2021), Sprain of ribs, initial encounter (09/09/2021), Strain of muscle, fascia and tendon of lower back, subsequent encounter (09/10/2019), Type 2 diabetes mellitus with mild nonproliferative diabetic retinopathy without macular edema, bilateral (12/09/2021), and 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    Surgical History  He has a past surgical history that includes Tonsillectomy (05/29/2013); Gallbladder surgery (06/19/2013); Other surgical history (06/19/2013); Cataract extraction (06/19/2013); Colonoscopy (02/06/2017); Knee surgery (02/06/2017); Esophagogastroduodenoscopy (02/06/2017); Cystoscopy (02/06/2017); Cholecystectomy (02/06/2017); and Knee surgery (02/06/2017).     Social  "History  He reports that he has never smoked. He has never been exposed to tobacco smoke. He has never used smokeless tobacco. Alcohol use questions deferred to the physician. Drug use questions deferred to the physician.    Family History  Family History   Problem Relation Name Age of Onset    Pancreatic cancer Mother      Diabetes Mother      Heart disease Mother      Kidney disease Father      Hearing loss Brother      Other (elevated psa) Brother          serum    Other (cardiac disorder) Maternal Grandmother      Other (cardiac disorder) Maternal Grandfather      Other (cardiac disorder) Paternal Grandmother      Other (cardiac disorder) Paternal Grandfather         Medications  Current Outpatient Medications   Medication Instructions    acetaminophen (Tylenol) 325 mg tablet 1 tablet as needed    amLODIPine (NORVASC) 10 mg, oral, Every 24 hours    aspirin 81 mg chewable tablet 1 tablet, Daily    blood-glucose sensor (Dexcom G7 Sensor) device For continuous glucose monitoring.  Change every 10 days.    calcitriol (Rocaltrol) 0.5 mcg capsule 1 capsule, Every 24 hours    Dexcom G4 platinum  (Dexcom G7 ) misc For continuous glucose monitoring.    FreeStyle Lite Strips strip For glucose testing 3 times daily    insulin lispro (HUMALOG KWIKPEN INSULIN) 4-10 Units, subcutaneous, 3 times daily before meals, Take as directed per insulin instructions.    Lantus U-100 Insulin 62-65 Units, subcutaneous, Nightly    metoprolol succinate XL (TOPROL-XL) 50 mg, oral, Daily    omega 3-dha-epa-fish oil (Fish OiL) 1,200 (144-216) mg capsule Fish Oil 1200 MG Oral Capsule  Refills: 0  Start: 6-May-2021    omeprazole 20 mg tablet,disintegrat, delay rel Take by mouth.    pen needle, diabetic (Novofine 32) 32 gauge x 1/4\" needle 3-4 times daily    pravastatin (Pravachol) 20 mg tablet take 1 tablet by mouth ONCE every 24 hours    testosterone (ANDROGEL) 50 mg, transdermal, Daily    torsemide (Demadex) 20 mg tablet 1 " tablet, Daily       Allergies  Cortisone and Penicillins    Review of Systems   Constitutional:  Negative for appetite change and fever.   HENT:  Negative for sore throat.         Denies dry mouth   Eyes:  Negative for visual disturbance.   Respiratory:  Negative for cough and shortness of breath.    Cardiovascular:  Negative for chest pain.   Gastrointestinal:  Negative for abdominal pain, constipation, diarrhea, nausea and vomiting.   Endocrine: Negative for polydipsia and polyuria.   Genitourinary:  Negative for frequency.   Musculoskeletal:  Positive for arthralgias and back pain.   Skin:  Negative for rash.   Neurological:  Negative for headaches.   Psychiatric/Behavioral:  Positive for dysphoric mood. The patient is nervous/anxious.          Last Recorded Vitals  Blood pressure 151/79, pulse 61, weight 105 kg (232 lb 3.2 oz).    Physical Exam  Constitutional:       General: He is not in acute distress.     Appearance: He is obese.      Comments: Walking with cane   HENT:      Head: Normocephalic.      Mouth/Throat:      Mouth: Mucous membranes are moist.   Eyes:      Extraocular Movements: Extraocular movements intact.   Neck:      Thyroid: No thyroid mass or thyromegaly.   Cardiovascular:      Pulses:           Radial pulses are 2+ on the right side and 2+ on the left side.   Lymphadenopathy:      Cervical: No cervical adenopathy.   Neurological:      Mental Status: He is alert.      Motor: No tremor.   Psychiatric:         Mood and Affect: Affect is flat.          Relevant Results  Glucose (mg/dL)   Date Value   11/19/2024 79   05/14/2024 159 (H)   02/26/2024 140 (H)     POC HEMOGLOBIN A1c (%)   Date Value   08/22/2024 8.0 (A)   01/25/2024 9.7 (A)   10/24/2023 9.3 (A)     Bicarbonate (mmol/L)   Date Value   11/19/2024 23   05/14/2024 25   02/26/2024 23     Urea Nitrogen (mg/dL)   Date Value   11/19/2024 43 (H)   05/14/2024 48 (H)   02/26/2024 62 (H)     Creatinine (mg/dL)   Date Value   11/19/2024 3.36 (H)    05/14/2024 2.84 (H)   02/26/2024 2.90 (H)     Lab Results   Component Value Date    CHOL 88 02/26/2024    CHOL 161 01/03/2023    CHOL 171 06/08/2021     Lab Results   Component Value Date    HDL 34.4 02/26/2024    HDL 40.8 01/03/2023    HDL 34.9 (A) 06/08/2021     Lab Results   Component Value Date    LDLCALC 28 02/26/2024     Lab Results   Component Value Date    TRIG 126 02/26/2024    TRIG 160 (H) 01/03/2023    TRIG 315 (H) 06/08/2021     Lab Results   Component Value Date    TSH 1.25 05/20/2019       IMPRESSION  TYPE 2 DIABETES MELLITUS  LONG TERM CURRENT INSULIN USE   Rapid A1c 7.9%  A1c stable from last appointment, improved over the past year  Hyperglycemic overnight, but he states some AM hypoglycemia.  None in the past 2 weeks on DexCom  Patient is adhering to and benefitting from continuous glucose monitoring.       RECOMMENDATIONS  Continue Lantus 62 units at bedtime     Humalog BEFORE MEALS ONLY   None If glucose under 100 mg/dl  Take 4 units if glucose 100-150 mg/dl, add 1 unit for every 50 over glucose 101 mg/dl        FOR COLONOSCOPY  Take Lantus 35 units the night before the prep and the night before procedure  Hold Humalog during day of prep.     Follow up 3 months

## 2024-12-04 NOTE — LETTER
December 4, 2024     Robinson Ackerman DO  [id number] [facility]  [address]  [facility] [address]    Patient: Michael Szymanski   YOB: 1945   Date of Visit: 12/4/2024       Dear Dr. Robinson Ackerman DO:    Thank you for referring Michael Szymanski to me for evaluation. Below are my notes for this consultation.  If you have questions, please do not hesitate to call me. I look forward to following your patient along with you.       Sincerely,     Tye Brizuela MD      CC: No Recipients  ______________________________________________________________________________________    History Of Present Illness  Michael Szymanski is a 79 y.o. male     Duration of type 2 diabetes mellitus:  25 years  Complications:  chronic kidney disease    Right leg wound, discharged from wound care clinic.  He no longer requires wraps.    Lantus 62 units at bedtime     Humalog still taking after meals, 6-8 units  He has difficulty choosing a dose before eating    DexCom G7  Patient is testing glucose 1440 times daily  Records reviewed, on file  Stated some AM hypoglycemia     Last eye exam:  8/6/24    Advised I received communication from his sister who was concerned about his self care at home.  Patient denies any need for assistance with home care.     Past Medical History  He has a past medical history of Acute pansinusitis, unspecified (08/06/2019), Benign prostatic hyperplasia without lower urinary tract symptoms, Cellulitis of left lower limb (10/08/2021), Encounter for immunization (03/21/2017), Hyperlipidemia, unspecified (12/10/2015), Hypertensive chronic kidney disease with stage 1 through stage 4 chronic kidney disease, or unspecified chronic kidney disease (12/09/2021), Klinefelter syndrome, unspecified (HHS-HCC), Localized edema (09/29/2021), Low back pain, unspecified (04/25/2018), Morbid (severe) obesity due to excess calories (Multi) (05/14/2020), Morbid (severe) obesity due to excess calories (Multi) (12/11/2021), Obesity, unspecified  (08/07/2019), Other abnormal glucose, Other conditions influencing health status (03/12/2022), Other conditions influencing health status (03/12/2022), Other specified abnormal findings of blood chemistry (02/13/2020), Other specified symptoms and signs involving the circulatory and respiratory systems (12/31/2018), Other symptoms and signs involving the musculoskeletal system (04/25/2018), Other tear of medial meniscus, current injury, unspecified knee, initial encounter, Personal history of diseases of the skin and subcutaneous tissue (08/07/2019), Personal history of other diseases of male genital organs, Personal history of other diseases of the circulatory system, Personal history of other diseases of the musculoskeletal system and connective tissue, Personal history of other diseases of the musculoskeletal system and connective tissue, Personal history of other diseases of the nervous system and sense organs, Personal history of other diseases of the respiratory system (06/25/2018), Personal history of other diseases of the respiratory system, Personal history of other diseases of urinary system (04/19/2017), Personal history of other endocrine, nutritional and metabolic disease, Personal history of other endocrine, nutritional and metabolic disease, Personal history of other endocrine, nutritional and metabolic disease, Personal history of other medical treatment, Personal history of other specified conditions (05/14/2020), Personal history of other specified conditions (10/21/2021), Personal history of other specified conditions (06/19/2013), Personal history of urinary (tract) infections (01/13/2022), Proteinuria, unspecified, Spondylosis without myelopathy or radiculopathy, cervical region, Sprain of ribs, initial encounter (09/09/2021), Sprain of ribs, initial encounter (09/09/2021), Strain of muscle, fascia and tendon of lower back, subsequent encounter (09/10/2019), Type 2 diabetes mellitus with mild  nonproliferative diabetic retinopathy without macular edema, bilateral (12/09/2021), and 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    Surgical History  He has a past surgical history that includes Tonsillectomy (05/29/2013); Gallbladder surgery (06/19/2013); Other surgical history (06/19/2013); Cataract extraction (06/19/2013); Colonoscopy (02/06/2017); Knee surgery (02/06/2017); Esophagogastroduodenoscopy (02/06/2017); Cystoscopy (02/06/2017); Cholecystectomy (02/06/2017); and Knee surgery (02/06/2017).     Social History  He reports that he has never smoked. He has never been exposed to tobacco smoke. He has never used smokeless tobacco. Alcohol use questions deferred to the physician. Drug use questions deferred to the physician.    Family History  Family History   Problem Relation Name Age of Onset   • Pancreatic cancer Mother     • Diabetes Mother     • Heart disease Mother     • Kidney disease Father     • Hearing loss Brother     • Other (elevated psa) Brother          serum   • Other (cardiac disorder) Maternal Grandmother     • Other (cardiac disorder) Maternal Grandfather     • Other (cardiac disorder) Paternal Grandmother     • Other (cardiac disorder) Paternal Grandfather         Medications  Current Outpatient Medications   Medication Instructions   • acetaminophen (Tylenol) 325 mg tablet 1 tablet as needed   • amLODIPine (NORVASC) 10 mg, oral, Every 24 hours   • aspirin 81 mg chewable tablet 1 tablet, Daily   • blood-glucose sensor (Dexcom G7 Sensor) device For continuous glucose monitoring.  Change every 10 days.   • calcitriol (Rocaltrol) 0.5 mcg capsule 1 capsule, Every 24 hours   • Dexcom G4 platinum  (Dexcom G7 ) misc For continuous glucose monitoring.   • FreeStyle Lite Strips strip For glucose testing 3 times daily   • insulin lispro (HUMALOG KWIKPEN INSULIN) 4-10 Units, subcutaneous, 3 times daily before meals, Take as  "directed per insulin instructions.   • Lantus U-100 Insulin 62-65 Units, subcutaneous, Nightly   • metoprolol succinate XL (TOPROL-XL) 50 mg, oral, Daily   • omega 3-dha-epa-fish oil (Fish OiL) 1,200 (144-216) mg capsule Fish Oil 1200 MG Oral Capsule  Refills: 0  Start: 6-May-2021   • omeprazole 20 mg tablet,disintegrat, delay rel Take by mouth.   • pen needle, diabetic (Novofine 32) 32 gauge x 1/4\" needle 3-4 times daily   • pravastatin (Pravachol) 20 mg tablet take 1 tablet by mouth ONCE every 24 hours   • testosterone (ANDROGEL) 50 mg, transdermal, Daily   • torsemide (Demadex) 20 mg tablet 1 tablet, Daily       Allergies  Cortisone and Penicillins    Review of Systems   Constitutional:  Negative for appetite change and fever.   HENT:  Negative for sore throat.         Denies dry mouth   Eyes:  Negative for visual disturbance.   Respiratory:  Negative for cough and shortness of breath.    Cardiovascular:  Negative for chest pain.   Gastrointestinal:  Negative for abdominal pain, constipation, diarrhea, nausea and vomiting.   Endocrine: Negative for polydipsia and polyuria.   Genitourinary:  Negative for frequency.   Musculoskeletal:  Positive for arthralgias and back pain.   Skin:  Negative for rash.   Neurological:  Negative for headaches.   Psychiatric/Behavioral:  Positive for dysphoric mood. The patient is nervous/anxious.          Last Recorded Vitals  Blood pressure 151/79, pulse 61, weight 105 kg (232 lb 3.2 oz).    Physical Exam  Constitutional:       General: He is not in acute distress.     Appearance: He is obese.      Comments: Walking with cane   HENT:      Head: Normocephalic.      Mouth/Throat:      Mouth: Mucous membranes are moist.   Eyes:      Extraocular Movements: Extraocular movements intact.   Neck:      Thyroid: No thyroid mass or thyromegaly.   Cardiovascular:      Pulses:           Radial pulses are 2+ on the right side and 2+ on the left side.   Lymphadenopathy:      Cervical: No " cervical adenopathy.   Neurological:      Mental Status: He is alert.      Motor: No tremor.   Psychiatric:         Mood and Affect: Affect is flat.          Relevant Results  Glucose (mg/dL)   Date Value   11/19/2024 79   05/14/2024 159 (H)   02/26/2024 140 (H)     POC HEMOGLOBIN A1c (%)   Date Value   08/22/2024 8.0 (A)   01/25/2024 9.7 (A)   10/24/2023 9.3 (A)     Bicarbonate (mmol/L)   Date Value   11/19/2024 23   05/14/2024 25   02/26/2024 23     Urea Nitrogen (mg/dL)   Date Value   11/19/2024 43 (H)   05/14/2024 48 (H)   02/26/2024 62 (H)     Creatinine (mg/dL)   Date Value   11/19/2024 3.36 (H)   05/14/2024 2.84 (H)   02/26/2024 2.90 (H)     Lab Results   Component Value Date    CHOL 88 02/26/2024    CHOL 161 01/03/2023    CHOL 171 06/08/2021     Lab Results   Component Value Date    HDL 34.4 02/26/2024    HDL 40.8 01/03/2023    HDL 34.9 (A) 06/08/2021     Lab Results   Component Value Date    LDLCALC 28 02/26/2024     Lab Results   Component Value Date    TRIG 126 02/26/2024    TRIG 160 (H) 01/03/2023    TRIG 315 (H) 06/08/2021     Lab Results   Component Value Date    TSH 1.25 05/20/2019       IMPRESSION  TYPE 2 DIABETES MELLITUS  LONG TERM CURRENT INSULIN USE   Rapid A1c 7.9%  A1c stable from last appointment, improved over the past year  Hyperglycemic overnight, but he states some AM hypoglycemia.  None in the past 2 weeks on DexCom  Patient is adhering to and benefitting from continuous glucose monitoring.       RECOMMENDATIONS  Continue Lantus 62 units at bedtime     Humalog BEFORE MEALS ONLY   None If glucose under 100 mg/dl  Take 4 units if glucose 100-150 mg/dl, add 1 unit for every 50 over glucose 101 mg/dl        FOR COLONOSCOPY  Take Lantus 35 units the night before the prep and the night before procedure  Hold Humalog during day of prep.     Follow up 3 months

## 2024-12-06 ENCOUNTER — APPOINTMENT (OUTPATIENT)
Dept: PRIMARY CARE | Facility: CLINIC | Age: 79
End: 2024-12-06
Payer: MEDICARE

## 2024-12-12 ENCOUNTER — APPOINTMENT (OUTPATIENT)
Dept: PRIMARY CARE | Facility: CLINIC | Age: 79
End: 2024-12-12
Payer: MEDICARE

## 2024-12-12 ENCOUNTER — OFFICE VISIT (OUTPATIENT)
Dept: VASCULAR SURGERY | Facility: CLINIC | Age: 79
End: 2024-12-12
Payer: MEDICARE

## 2024-12-12 VITALS
WEIGHT: 232 LBS | OXYGEN SATURATION: 94 % | DIASTOLIC BLOOD PRESSURE: 73 MMHG | SYSTOLIC BLOOD PRESSURE: 138 MMHG | HEIGHT: 68 IN | HEART RATE: 70 BPM | BODY MASS INDEX: 35.16 KG/M2

## 2024-12-12 VITALS — BODY MASS INDEX: 35.28 KG/M2 | WEIGHT: 232 LBS

## 2024-12-12 DIAGNOSIS — I87.332: ICD-10-CM

## 2024-12-12 DIAGNOSIS — M54.42 CHRONIC BILATERAL LOW BACK PAIN WITH LEFT-SIDED SCIATICA: ICD-10-CM

## 2024-12-12 DIAGNOSIS — N18.4 STAGE 4 CHRONIC KIDNEY DISEASE (MULTI): ICD-10-CM

## 2024-12-12 DIAGNOSIS — M79.645 BILATERAL THUMB PAIN: Primary | ICD-10-CM

## 2024-12-12 DIAGNOSIS — M79.644 BILATERAL THUMB PAIN: Primary | ICD-10-CM

## 2024-12-12 DIAGNOSIS — G89.29 CHRONIC BILATERAL LOW BACK PAIN WITH LEFT-SIDED SCIATICA: ICD-10-CM

## 2024-12-12 DIAGNOSIS — I73.9 PERIPHERAL VASCULAR DISEASE (CMS-HCC): ICD-10-CM

## 2024-12-12 PROBLEM — E66.01 CLASS 3 SEVERE OBESITY DUE TO EXCESS CALORIES WITH SERIOUS COMORBIDITY AND BODY MASS INDEX (BMI) OF 40.0 TO 44.9 IN ADULT: Status: RESOLVED | Noted: 2023-09-23 | Resolved: 2024-12-12

## 2024-12-12 PROBLEM — E66.01 CLASS 3 SEVERE OBESITY WITH SERIOUS COMORBIDITY AND BODY MASS INDEX (BMI) OF 40.0 TO 44.9 IN ADULT: Status: RESOLVED | Noted: 2023-09-23 | Resolved: 2024-12-12

## 2024-12-12 PROBLEM — E66.813 CLASS 3 SEVERE OBESITY DUE TO EXCESS CALORIES WITH SERIOUS COMORBIDITY AND BODY MASS INDEX (BMI) OF 40.0 TO 44.9 IN ADULT: Status: RESOLVED | Noted: 2023-09-23 | Resolved: 2024-12-12

## 2024-12-12 PROBLEM — E66.813 CLASS 3 SEVERE OBESITY WITH SERIOUS COMORBIDITY AND BODY MASS INDEX (BMI) OF 40.0 TO 44.9 IN ADULT: Status: RESOLVED | Noted: 2023-09-23 | Resolved: 2024-12-12

## 2024-12-12 PROBLEM — E66.01 OBESITY, MORBID (MULTI): Status: RESOLVED | Noted: 2023-05-01 | Resolved: 2024-12-12

## 2024-12-12 PROCEDURE — 99204 OFFICE O/P NEW MOD 45 MIN: CPT | Performed by: SURGERY

## 2024-12-12 PROCEDURE — 1160F RVW MEDS BY RX/DR IN RCRD: CPT | Performed by: INTERNAL MEDICINE

## 2024-12-12 PROCEDURE — 1160F RVW MEDS BY RX/DR IN RCRD: CPT | Performed by: SURGERY

## 2024-12-12 PROCEDURE — 99214 OFFICE O/P EST MOD 30 MIN: CPT | Performed by: SURGERY

## 2024-12-12 PROCEDURE — 3078F DIAST BP <80 MM HG: CPT | Performed by: SURGERY

## 2024-12-12 PROCEDURE — 1157F ADVNC CARE PLAN IN RCRD: CPT | Performed by: SURGERY

## 2024-12-12 PROCEDURE — 1126F AMNT PAIN NOTED NONE PRSNT: CPT | Performed by: SURGERY

## 2024-12-12 PROCEDURE — 1036F TOBACCO NON-USER: CPT | Performed by: SURGERY

## 2024-12-12 PROCEDURE — 3075F SYST BP GE 130 - 139MM HG: CPT | Performed by: SURGERY

## 2024-12-12 PROCEDURE — 1157F ADVNC CARE PLAN IN RCRD: CPT | Performed by: INTERNAL MEDICINE

## 2024-12-12 PROCEDURE — 1036F TOBACCO NON-USER: CPT | Performed by: INTERNAL MEDICINE

## 2024-12-12 PROCEDURE — 1159F MED LIST DOCD IN RCRD: CPT | Performed by: INTERNAL MEDICINE

## 2024-12-12 PROCEDURE — 99214 OFFICE O/P EST MOD 30 MIN: CPT | Performed by: INTERNAL MEDICINE

## 2024-12-12 PROCEDURE — 1159F MED LIST DOCD IN RCRD: CPT | Performed by: SURGERY

## 2024-12-12 ASSESSMENT — PAIN SCALES - GENERAL: PAINLEVEL_OUTOF10: 0-NO PAIN

## 2024-12-12 NOTE — PATIENT INSTRUCTIONS
Dr. Sepulveda [phone]    [facility] (Dr. Caicedo, Dr. Granda, Dr. Warner and Dr. Shukla)--[phone]     Physical Therapy main number [phone]  [facility] ([facility]) [phone]    Come back in 6 mo

## 2024-12-12 NOTE — PATIENT INSTRUCTIONS
- Venous stasis ulcers, currently healed but history of right leg blistering. Continue compression stockings  - Venous ultrasound to evaluate insufficiency and reflux  - Referral to [facility] or Dr. Styles for vein center further management  - Call office [phone] with any questions or concerns

## 2024-12-12 NOTE — PROGRESS NOTES
Subjective   Patient ID: Michael Szymanski is a 79 y.o. male who presents for Follow-up.    HPI     Reports b/l hand pain and swelling earlier this week. Started with the right hand then the left. Has been using Voltaren gel and move his hands more which has been helpful. Pain the worst at the base in of b/l thumbs     Saw Vascular surgery today for right leg nerous stasis ulcer. Was referred to vein center. No longer seeing wound center. He is wearing compression stockings     Sugars are improved. Recent A1c 7.9%     Has questions regarding dialysis. GFR 18, worsening. Although, no clear indication for HD as of yet. Follows with Dr. Gonzalez         Review of Systems   All other systems reviewed and are negative.      Objective   Wt 105 kg (232 lb)   BMI 35.28 kg/m²     Physical Exam    Assessment/Plan       #B/l hand pain   -Suspect [facility] OA. Refer to ortho-hand     #Low back pain, impaired mobility   -Evaluated at [facility] and was told he is not a surgical candidate   -CT lumbar 9/4/19: mod-severe OA   -ordered PT and pain management referrals at previous visit, I have reordered these referrals today       #HTN  -Well controlled. Today . Cont amlodipine, metoprolol and torsemide  -No ACE/ARB due to borderline hyperkalemia     #HLD  -Cont pravastatin    #DM  -Follows with Dr. Brizuela. Cont Lantus and SSI      #CKD stage 4  -Follows with Dr. Gonzalez. No ACE/ARB due to borderline hyperkalemia  -Cont calcitriol     #Klinefelter's syndrome, low testosterone, h/o prostate ca   -Follows with urology. cont testosterone supplementation      Colonoscopy: Scheduled for 1/2025  PSA: 11/2024     RTC 6 mo

## 2024-12-16 NOTE — PROGRESS NOTES
Vascular Surgery Clinic Note    CC: RLE venous stasis ulcers    HPI:  Michael Szymanski is 79 y.o. male with history of RLE venous stasis wounds being treated with compression and wound care. He states the ulcerations were larger previously but have improved, though not resolved completely. No drainage from the wounds. He is ambulatory. He denies any history of vascular procedures or interventions in the past.  He has not undergone VI studies. No claudication, rest pain.    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 90 tablet 0    aspirin 81 mg chewable tablet Chew 1 tablet (81 mg) once daily.      blood-glucose sensor (Dexcom G7 Sensor) device For continuous glucose monitoring.  Change every 10 days. 9 each 3    calcitriol (Rocaltrol) 0.5 mcg capsule 1 capsule (0.5 mcg) once every 24 hours.      Dexcom G4 platinum  (Dexcom G7 ) misc For continuous glucose monitoring. 1 each 0    FreeStyle Lite Strips strip For glucose testing 3 times  "daily 300 strip 3    insulin lispro (HumaLOG KwikPen Insulin) 100 unit/mL injection Inject 4-10 Units under the skin 3 times a day before meals. Take as directed per insulin instructions. 30 mL 3    Lantus U-100 Insulin 100 unit/mL injection Inject 62-65 Units under the skin once daily at bedtime. 60 mL 3    metoprolol succinate XL (Toprol-XL) 50 mg 24 hr tablet TAKE 1 TABLET BY MOUTH ONCE DAILY 180 tablet 2    omega 3-dha-epa-fish oil (Fish OiL) 1,200 (144-216) mg capsule Fish Oil 1200 MG Oral Capsule  Refills: 0  Start: 6-May-2021      omeprazole 20 mg tablet,disintegrat, delay rel Take by mouth.      pen needle, diabetic (Novofine 32) 32 gauge x 1/4\" needle 3-4 times daily 400 each 0    pravastatin (Pravachol) 20 mg tablet take 1 tablet by mouth ONCE every 24 hours 90 tablet 2    torsemide (Demadex) 20 mg tablet Take 1 tablet (20 mg) by mouth once daily.      testosterone 50 mg/5 gram (1 %) gel Place 1 packet (50 mg) on the skin once daily. 30 packet 5     No current facility-administered medications on file prior to visit.        Allergies:   Allergies   Allergen Reactions    Cortisone Unknown    Penicillins Rash and Unknown       SH:    Social Drivers of Health     Tobacco Use: Low Risk  (12/15/2024)    Patient History     Smoking Tobacco Use: Never     Smokeless Tobacco Use: Never     Passive Exposure: Never   Alcohol Use: Not on file   Financial Resource Strain: Not on file   Food Insecurity: Not on file   Transportation Needs: Not on file   Physical Activity: Not on file   Stress: Not on file   Social Connections: Not on file   Intimate Partner Violence: Not on file   Depression: Not at risk (12/12/2024)    PHQ-2     PHQ-2 Score: 0   Housing Stability: Not on file   Utilities: Not on file   Digital Equity: Not on file   Health Literacy: Not on file        FH:  Family History   Problem Relation Name Age of Onset    Pancreatic cancer Mother      Diabetes Mother      Heart disease Mother      Kidney disease " Father      Hearing loss Brother      Other (elevated psa) Brother          serum    Other (cardiac disorder) Maternal Grandmother      Other (cardiac disorder) Maternal Grandfather      Other (cardiac disorder) Paternal Grandmother      Other (cardiac disorder) Paternal Grandfather          ROS:  All systems were reviewed and are negative except as per HPI.    Objective:  Vitals:  Vitals:    12/12/24 1510   BP: 138/73   Pulse: 70   SpO2:         Exam:  Constitutional: normal, well appearing  HEENT: normocephalic  CV: regular rate  RESP: symmetric expansion, unlabored breathing  GI: soft, nontender, nondistended  MSK: normal ROM  INT: no lesions  PSYCH: appropriate mood  NEURO: no deficits  VASC: Palpable DP, right lower leg with lipodermatosclerosis and healed venous ulceration    Assessment & Plan:  Michael Szymanski is 79 y.o. male with RLE venous stasis ulcer    - Venous stasis ulcers, currently healed but history of right leg blistering. Continue compression stockings  - Venous ultrasound to evaluate insufficiency and reflux  - Referral to [facility] or Dr. Styles for vein center further management      Meds      Screening/Surveillance      Next Followup      Rula Mariano MD

## 2025-01-02 ENCOUNTER — APPOINTMENT (OUTPATIENT)
Dept: VASCULAR MEDICINE | Facility: CLINIC | Age: 80
End: 2025-01-02
Payer: MEDICARE

## 2025-01-07 ENCOUNTER — TELEPHONE (OUTPATIENT)
Dept: PREADMISSION TESTING | Facility: HOSPITAL | Age: 80
End: 2025-01-07

## 2025-01-09 ENCOUNTER — APPOINTMENT (OUTPATIENT)
Dept: OPERATING ROOM | Facility: HOSPITAL | Age: 80
End: 2025-01-09
Payer: MEDICARE

## 2025-01-22 ENCOUNTER — APPOINTMENT (OUTPATIENT)
Dept: VASCULAR SURGERY | Facility: CLINIC | Age: 80
End: 2025-01-22
Payer: MEDICARE

## 2025-01-27 ENCOUNTER — TELEPHONE (OUTPATIENT)
Dept: ENDOCRINOLOGY | Facility: CLINIC | Age: 80
End: 2025-01-27
Payer: MEDICARE

## 2025-01-27 NOTE — TELEPHONE ENCOUNTER
Pt called and LM on 1/27/25 at 10:28 am-  stated he need the supply company name and phone number. Provided # [phone]     Returned pt call and LM with [employer] name and phone # [phone] . Provided office contact details.

## 2025-01-28 ENCOUNTER — APPOINTMENT (OUTPATIENT)
Dept: PAIN MEDICINE | Facility: CLINIC | Age: 80
End: 2025-01-28
Payer: MEDICARE

## 2025-01-29 ENCOUNTER — APPOINTMENT (OUTPATIENT)
Dept: VASCULAR MEDICINE | Facility: CLINIC | Age: 80
End: 2025-01-29
Payer: MEDICARE

## 2025-02-06 ENCOUNTER — ANCILLARY PROCEDURE (OUTPATIENT)
Dept: VASCULAR MEDICINE | Facility: CLINIC | Age: 80
End: 2025-02-06
Payer: MEDICARE

## 2025-02-06 ENCOUNTER — APPOINTMENT (OUTPATIENT)
Dept: PAIN MEDICINE | Facility: CLINIC | Age: 80
End: 2025-02-06
Payer: MEDICARE

## 2025-02-06 DIAGNOSIS — I87.332: ICD-10-CM

## 2025-02-06 DIAGNOSIS — I87.323 CHRONIC VENOUS HYPERTENSION (IDIOPATHIC) WITH INFLAMMATION OF BILATERAL LOWER EXTREMITY: ICD-10-CM

## 2025-02-06 DIAGNOSIS — I73.9 PERIPHERAL VASCULAR DISEASE (CMS-HCC): ICD-10-CM

## 2025-02-06 PROCEDURE — 93970 EXTREMITY STUDY: CPT | Performed by: INTERNAL MEDICINE

## 2025-02-06 PROCEDURE — 93970 EXTREMITY STUDY: CPT

## 2025-02-06 NOTE — PROGRESS NOTES
"[facility] Pain Management  New Patient Office Visit Note 2025    Patient Information: Michael Szymanski, MRN: [id number], : 1945   Primary Care/Referring Physician: Robinson Ackerman [place], [id number] [address] [address]     Chief Complaint: ***  History of Pain: Mr. Michael Szymanski is a 79 y.o. male with a PMHx of HTN, HLD, T2DM, CKD, Klinefelter's syndrome who presents for ***.    CT lumbar spine from 2019. Reportedly seen at [facility] orthopedics with no plan for surgery    Current Pain Medications:  Previously Tried Pain Medications:    Relevant Surgeries: ***  Injections: ***  Physical/Occupational Therapy: ***The patient does not wish to pursue PT/OT at this time.    Medications:   Current Outpatient Medications   Medication Instructions    acetaminophen (Tylenol) 325 mg tablet 1 tablet as needed    amLODIPine (NORVASC) 10 mg, oral, Every 24 hours    aspirin 81 mg chewable tablet 1 tablet, Daily    blood-glucose sensor (Dexcom G7 Sensor) device For continuous glucose monitoring.  Change every 10 days.    calcitriol (Rocaltrol) 0.5 mcg capsule 1 capsule, Every 24 hours    Dexcom G4 platinum  (Dexcom G7 ) misc For continuous glucose monitoring.    FreeStyle Lite Strips strip For glucose testing 3 times daily    insulin lispro (HUMALOG KWIKPEN INSULIN) 4-10 Units, subcutaneous, 3 times daily before meals, Take as directed per insulin instructions.    Lantus U-100 Insulin 62-65 Units, subcutaneous, Nightly    metoprolol succinate XL (TOPROL-XL) 50 mg, oral, Daily    omega 3-dha-epa-fish oil (Fish OiL) 1,200 (144-216) mg capsule Fish Oil 1200 MG Oral Capsule  Refills: 0  Start: -May-2021    omeprazole 20 mg tablet,disintegrat, delay rel Take by mouth.    pen needle, diabetic (Novofine 32) 32 gauge x 1/4\" needle 3-4 times daily    pravastatin (Pravachol) 20 mg tablet take 1 tablet by mouth ONCE every 24 hours    testosterone (ANDROGEL) 50 mg, transdermal, Daily    torsemide (Demadex) 20 mg " tablet 1 tablet, Daily      Allergies:   Allergies   Allergen Reactions    Cortisone Unknown    Penicillins Rash and Unknown       Past Medical & Surgical History:  Past Medical History:   Diagnosis Date    Acute pansinusitis, unspecified 08/06/2019    Acute non-recurrent pansinusitis    Benign prostatic hyperplasia without lower urinary tract symptoms     BPH (benign prostatic hypertrophy)    Cellulitis of left lower limb 10/08/2021    Cellulitis of left lower extremity    Encounter for immunization 03/21/2017    Encounter for administration of vaccine    Hyperlipidemia, unspecified 12/10/2015    Hypertensive chronic kidney disease with stage 1 through stage 4 chronic kidney disease, or unspecified chronic kidney disease 12/09/2021    Benign hypertension with CKD (chronic kidney disease) stage III    Klinefelter syndrome, unspecified ([facility])     History of Klinefelter syndrome    Localized edema 09/29/2021    Edema of extremity    Low back pain, unspecified 04/25/2018    Low back pain with radiation    Morbid (severe) obesity due to excess calories (Multi) 05/14/2020    Obesity, Class III, BMI 40-49.9 (morbid obesity)    Morbid (severe) obesity due to excess calories (Multi) 12/11/2021    Morbid obesity with BMI of 40.0-44.9, adult    Obesity, unspecified 08/07/2019    Obesity, Class II, BMI 35-39.9    Other abnormal glucose     Hemoglobin A1c greater than 9.0%    Other conditions influencing health status 03/12/2022    Uncontrolled diabetes mellitus with diabetic nephropathy    Other conditions influencing health status 03/12/2022    Uncontrolled diabetes mellitus with diabetic nephropathy    Other specified abnormal findings of blood chemistry 02/13/2020    Low vitamin D level    Other specified symptoms and signs involving the circulatory and respiratory systems 12/31/2018    Prolonged capillary refill time    Other symptoms and signs involving the musculoskeletal system 04/25/2018    Weakness of lower  extremity    Other tear of medial meniscus, current injury, unspecified knee, initial encounter     MMT (medial meniscus tear)    Personal history of diseases of the skin and subcutaneous tissue 08/07/2019    History of partial thickness sunburn    Personal history of other diseases of male genital organs     History of prostatitis    Personal history of other diseases of the circulatory system     History of hypertension    Personal history of other diseases of the musculoskeletal system and connective tissue     History of osteoarthritis    Personal history of other diseases of the musculoskeletal system and connective tissue     History of bursitis    Personal history of other diseases of the nervous system and sense organs     History of cataract    Personal history of other diseases of the respiratory system 06/25/2018    History of acute bronchitis    Personal history of other diseases of the respiratory system     History of bronchitis    Personal history of other diseases of urinary system 04/19/2017    History of hematuria    Personal history of other endocrine, nutritional and metabolic disease     History of hyperlipidemia    Personal history of other endocrine, nutritional and metabolic disease     History of obesity    Personal history of other endocrine, nutritional and metabolic disease     History of diabetic retinopathy    Personal history of other medical treatment     History of stress test    Personal history of other specified conditions 05/14/2020    History of edema    Personal history of other specified conditions 10/21/2021    History of fatigue    Personal history of other specified conditions 06/19/2013    History of elevated prostate specific antigen (PSA)    Personal history of urinary (tract) infections 01/13/2022    History of urinary tract infection    Proteinuria, unspecified     Proteinuria    Spondylosis without myelopathy or radiculopathy, cervical region     Degenerative joint  disease of cervical spine    Sprain of ribs, initial encounter 09/09/2021    Sprain of ribs    Sprain of ribs, initial encounter 09/09/2021    Sprain of costal cartilage, initial encounter    Strain of muscle, fascia and tendon of lower back, subsequent encounter 09/10/2019    Lumbosacral strain, subsequent encounter    Type 2 diabetes mellitus with mild nonproliferative diabetic retinopathy without macular edema, bilateral 12/09/2021    Type 2 diabetes mellitus with mild nonproliferative diabetic retinopathy without macular edema, bilateral    Varicose veins of unspecified lower extremity with ulcer of unspecified site (CODE) 10/08/2021    Venous stasis ulcer with varicose veins of lower extremity      Past Surgical History:   Procedure Laterality Date    CATARACT EXTRACTION  06/19/2013    Cataract Surgery    CHOLECYSTECTOMY  02/06/2017    Cholecystectomy    COLONOSCOPY  02/06/2017    Colonoscopy    CYSTOSCOPY  02/06/2017    Diagnostic Cystoscopy    ESOPHAGOGASTRODUODENOSCOPY  02/06/2017    Esophagogastroduodenoscopy With Biopsy    GALLBLADDER SURGERY  06/19/2013    Gallbladder Surgery    KNEE SURGERY  02/06/2017    Knee Surgery Left    KNEE SURGERY  02/06/2017    Knee Surgery Right    OTHER SURGICAL HISTORY  06/19/2013    Prostatectomy Perineal Radical    TONSILLECTOMY  05/29/2013    Tonsillectomy       Family History   Problem Relation Name Age of Onset    Pancreatic cancer Mother      Diabetes Mother      Heart disease Mother      Kidney disease Father      Hearing loss Brother      Other (elevated psa) Brother          serum    Other (cardiac disorder) Maternal Grandmother      Other (cardiac disorder) Maternal Grandfather      Other (cardiac disorder) Paternal Grandmother      Other (cardiac disorder) Paternal Grandfather       Social History     Socioeconomic History    Marital status: Single     Spouse name: Not on file    Number of children: Not on file    Years of education: Not on file    Highest  education level: Not on file   Occupational History    Not on file   Tobacco Use    Smoking status: Never     Passive exposure: Never    Smokeless tobacco: Never   Vaping Use    Vaping status: Never Used   Substance and Sexual Activity    Alcohol use: Defer    Drug use: Defer    Sexual activity: Defer   Other Topics Concern    Not on file   Social History Narrative    Not on file     Social Drivers of Health     Financial Resource Strain: Not on file   Food Insecurity: Not on file   Transportation Needs: Not on file   Physical Activity: Not on file   Stress: Not on file   Social Connections: Not on file   Intimate Partner Violence: Not on file   Housing Stability: Not on file       Problems, Past medical history, past surgical history, Medications, allergies, social and family history reviewed and as per the electronic medical record from today's encounter    Review of Systems:  CONST: No fever, chills, fatigue, weight changes  EYES: No loss of vision  ENT: No hearing loss, tinnitus  CV: No chest pain, palpitations  RESP: No dyspnea, shortness of breath, cough  GI: No stool incontinence, nausea, vomiting  : No urinary incontinence  MSK: No joint swelling  SKIN: No rash, no hives  NEURO: No headache, dizziness, weakness, paresthesias  PSYCH: No anxiety, depression or suicidal ideation  HEM/LYMPH: No easy bruising or bleeding  All other systems reviewed are negative     Physical Exam:  Vitals: There were no vitals taken for this visit.  General: No apparent distress. Alert, appropriate, oriented x 3. Mood and affect normal. Speaking in full sentences.  HENT: Normocephalic, atraumatic. Hearing intact.  Eyes: Extraocular movements grossly intact. Pupils equal and round.   Neck: Supple, trachea midline.  Lungs: Symmetric respiratory excursion on visual exam, nonlabored breathing.  Extremities: No clubbing, cyanosis, or edema noted in arms or legs.  Skin: No rashes, lesions, alopecia noted on back or extremities.   MSK:  ***  Neck: Flexion, extension, rotation and lateral bending does not exacerbate pain. No tenderness over the spinous processes, cervical paraspinal muscles, or bilateral trapezius muscles.  Back: Flexion, extension, rotation and lateral bending does*** exacerbate pain. No tenderness over the spinous processes, lumbar facets, SIJ, or GTB bilaterally. ASHER test and straight leg raise test negative bilaterally. No spasm noted over musculature. No misalignment or asymmetry noted.  Neuro: Alert and appropriate. Motor strength 5/5 throughout bilateral upper and lower extremities. Sensory intact to light touch bilateral upper and lower extremities. Gait within normal limits. Bulk and tone within normal limits.    Laboratory Data:  The following laboratory data were reviewed during this visit:   Lab Results   Component Value Date    WBC 7.5 11/19/2024    RBC 4.59 11/19/2024    HGB 14.0 11/19/2024    HCT 46.3 11/19/2024     11/19/2024      Lab Results   Component Value Date    INR 1.0 09/23/2021     Lab Results   Component Value Date    CREATININE 3.36 (H) 11/19/2024    HGBA1C 7.9 (A) 12/04/2024       Imaging:  The following imaging impressions were reviewed by me during this visit:    No results found for this or any previous visit from the past 180 days.       I also personally reviewed the images from the above studies myself. These images and my interpretation of them contributed to the management and decision making of the patient's medical plan.    ASSESSMENT:  Mr. Michael Szymanski is a 79 y.o. male with *** pain that is consistent with:    No diagnosis found.    PLAN:    Diagnostics: No further diagnostics are indicated at this time.    Physical Therapy and Rehabilitation:     - ***    Psychologically:  - ***    Medications  - ***  Duration  - ***    Interventions:  - ***        Sincerely,  Anthony Shukla MD  [facility] [place]

## 2025-02-12 ENCOUNTER — APPOINTMENT (OUTPATIENT)
Dept: VASCULAR SURGERY | Facility: CLINIC | Age: 80
End: 2025-02-12
Payer: MEDICARE

## 2025-02-19 NOTE — PROGRESS NOTES
[facility] Pain [facility]  New Patient Office Visit Note 2025    Patient Information: Michael Szymanski, MRN: [id number], : 1945   Primary Care/Referring Physician: Robinson Ackerman DO, [id number] [facility] [address] / [facility] [address]     Chief Complaint: Low back and left hip pain  History of Pain: Mr. Michael Szymanski is a 79 y.o. male with a PMHx of HTN, HLD, T2DM (A1c 7.9%), CKD, Klinefelter's syndrome who presents for left hip and intermittent low back pain.    He reports intermittent issues with left hip pain since his knee replacement approximately 8 years ago. He describes primarily left posterolateral buttock pain. He notices pain when trying to get out of bed in the morning, along with prolonged sitting. He denies any worsening of pain with walking. He denies any pain radiating further down his legs. He denies any numbness or tingling in his legs. He walks with hunched over posture. He was reportedly evaluated at [facility] orthopedics with no plan for surgery, although the details of this are unclear.     Current Pain Medications: OTC Tylenol - occasional mild relief  Previously Tried Pain Medications: States he had PO steroids as a teenager and it caused severe depression    Relevant Surgeries: Denies lumbar spine or hip surgery. Has bilateral TKA  Injections: Denies  Physical/Occupational Therapy: Is scheduled to start PT in a few weeks    Medications:   Current Outpatient Medications   Medication Instructions    acetaminophen (Tylenol) 325 mg tablet 1 tablet as needed    amLODIPine (NORVASC) 10 mg, oral, Every 24 hours    aspirin 81 mg chewable tablet 1 tablet, Daily    blood-glucose sensor (Dexcom G7 Sensor) device For continuous glucose monitoring.  Change every 10 days.    calcitriol (Rocaltrol) 0.5 mcg capsule 1 capsule, Every 24 hours    Dexcom G4 platinum  (Dexcom G7 ) misc For continuous glucose monitoring.    FreeStyle Lite Strips strip For glucose testing 3 times daily    gabapentin  "(NEURONTIN) 100 mg, oral, Nightly    insulin lispro (HUMALOG KWIKPEN INSULIN) 4-10 Units, subcutaneous, 3 times daily before meals, Take as directed per insulin instructions.    Lantus U-100 Insulin 62-65 Units, subcutaneous, Nightly    metoprolol succinate XL (TOPROL-XL) 50 mg, oral, Daily    omega 3-dha-epa-fish oil (Fish OiL) 1,200 (144-216) mg capsule Fish Oil 1200 MG Oral Capsule  Refills: 0  Start: 6-May-2021    omeprazole 20 mg tablet,disintegrat, delay rel Take by mouth.    pen needle, diabetic (Novofine 32) 32 gauge x 1/4\" needle 3-4 times daily    pravastatin (Pravachol) 20 mg tablet take 1 tablet by mouth ONCE every 24 hours    testosterone (ANDROGEL) 50 mg, transdermal, Daily    torsemide (Demadex) 20 mg tablet 1 tablet, Daily      Allergies:   Allergies   Allergen Reactions    Cortisone Unknown    Penicillins Rash and Unknown       Past Medical & Surgical History:  Past Medical History:   Diagnosis Date    Acute pansinusitis, unspecified 08/06/2019    Acute non-recurrent pansinusitis    Benign prostatic hyperplasia without lower urinary tract symptoms     BPH (benign prostatic hypertrophy)    Cellulitis of left lower limb 10/08/2021    Cellulitis of left lower extremity    Encounter for immunization 03/21/2017    Encounter for administration of vaccine    Hyperlipidemia, unspecified 12/10/2015    Hypertensive chronic kidney disease with stage 1 through stage 4 chronic kidney disease, or unspecified chronic kidney disease 12/09/2021    Benign hypertension with CKD (chronic kidney disease) stage III    Klinefelter syndrome, unspecified ([facility])     History of Klinefelter syndrome    Localized edema 09/29/2021    Edema of extremity    Low back pain, unspecified 04/25/2018    Low back pain with radiation    Morbid (severe) obesity due to excess calories (Multi) 05/14/2020    Obesity, Class III, BMI 40-49.9 (morbid obesity)    Morbid (severe) obesity due to excess calories (Multi) 12/11/2021    Morbid " obesity with BMI of 40.0-44.9, adult    Obesity, unspecified 08/07/2019    Obesity, Class II, BMI 35-39.9    Other abnormal glucose     Hemoglobin A1c greater than 9.0%    Other conditions influencing health status 03/12/2022    Uncontrolled diabetes mellitus with diabetic nephropathy    Other conditions influencing health status 03/12/2022    Uncontrolled diabetes mellitus with diabetic nephropathy    Other specified abnormal findings of blood chemistry 02/13/2020    Low vitamin D level    Other specified symptoms and signs involving the circulatory and respiratory systems 12/31/2018    Prolonged capillary refill time    Other symptoms and signs involving the musculoskeletal system 04/25/2018    Weakness of lower extremity    Other tear of medial meniscus, current injury, unspecified knee, initial encounter     MMT (medial meniscus tear)    Personal history of diseases of the skin and subcutaneous tissue 08/07/2019    History of partial thickness sunburn    Personal history of other diseases of male genital organs     History of prostatitis    Personal history of other diseases of the circulatory system     History of hypertension    Personal history of other diseases of the musculoskeletal system and connective tissue     History of osteoarthritis    Personal history of other diseases of the musculoskeletal system and connective tissue     History of bursitis    Personal history of other diseases of the nervous system and sense organs     History of cataract    Personal history of other diseases of the respiratory system 06/25/2018    History of acute bronchitis    Personal history of other diseases of the respiratory system     History of bronchitis    Personal history of other diseases of urinary system 04/19/2017    History of hematuria    Personal history of other endocrine, nutritional and metabolic disease     History of hyperlipidemia    Personal history of other endocrine, nutritional and metabolic disease      History of obesity    Personal history of other endocrine, nutritional and metabolic disease     History of diabetic retinopathy    Personal history of other medical treatment     History of stress test    Personal history of other specified conditions 05/14/2020    History of edema    Personal history of other specified conditions 10/21/2021    History of fatigue    Personal history of other specified conditions 06/19/2013    History of elevated prostate specific antigen (PSA)    Personal history of urinary (tract) infections 01/13/2022    History of urinary tract infection    Proteinuria, unspecified     Proteinuria    Spondylosis without myelopathy or radiculopathy, cervical region     Degenerative joint disease of cervical spine    Sprain of ribs, initial encounter 09/09/2021    Sprain of ribs    Sprain of ribs, initial encounter 09/09/2021    Sprain of costal cartilage, initial encounter    Strain of muscle, fascia and tendon of lower back, subsequent encounter 09/10/2019    Lumbosacral strain, subsequent encounter    Type 2 diabetes mellitus with mild nonproliferative diabetic retinopathy without macular edema, bilateral 12/09/2021    Type 2 diabetes mellitus with mild nonproliferative diabetic retinopathy without macular edema, bilateral    Varicose veins of unspecified lower extremity with ulcer of unspecified site (CODE) 10/08/2021    Venous stasis ulcer with varicose veins of lower extremity      Past Surgical History:   Procedure Laterality Date    CATARACT EXTRACTION  06/19/2013    Cataract Surgery    CHOLECYSTECTOMY  02/06/2017    Cholecystectomy    COLONOSCOPY  02/06/2017    Colonoscopy    CYSTOSCOPY  02/06/2017    Diagnostic Cystoscopy    ESOPHAGOGASTRODUODENOSCOPY  02/06/2017    Esophagogastroduodenoscopy With Biopsy    GALLBLADDER SURGERY  06/19/2013    Gallbladder Surgery    KNEE SURGERY  02/06/2017    Knee Surgery Left    KNEE SURGERY  02/06/2017    Knee Surgery Right    OTHER SURGICAL HISTORY   06/19/2013    Prostatectomy Perineal Radical    TONSILLECTOMY  05/29/2013    Tonsillectomy       Family History   Problem Relation Name Age of Onset    Pancreatic cancer Mother      Diabetes Mother      Heart disease Mother      Kidney disease Father      Hearing loss Brother      Other (elevated psa) Brother          serum    Other (cardiac disorder) Maternal Grandmother      Other (cardiac disorder) Maternal Grandfather      Other (cardiac disorder) Paternal Grandmother      Other (cardiac disorder) Paternal Grandfather       Social History     Socioeconomic History    Marital status: Single     Spouse name: Not on file    Number of children: Not on file    Years of education: Not on file    Highest education level: Not on file   Occupational History    Not on file   Tobacco Use    Smoking status: Never     Passive exposure: Never    Smokeless tobacco: Never   Vaping Use    Vaping status: Never Used   Substance and Sexual Activity    Alcohol use: Never    Drug use: Never    Sexual activity: Defer   Other Topics Concern    Not on file   Social History Narrative    Not on file     Social Drivers of Health     Financial Resource Strain: Not on file   Food Insecurity: Not on file   Transportation Needs: Not on file   Physical Activity: Not on file   Stress: Not on file   Social Connections: Not on file   Intimate Partner Violence: Not on file   Housing Stability: Not on file       Problems, Past medical history, past surgical history, Medications, allergies, social and family history reviewed and as per the electronic medical record from today's encounter    Review of Systems:  CONST: No fever, chills, fatigue, weight changes  EYES: No loss of vision  ENT: No hearing loss, tinnitus  CV: No chest pain, palpitations  RESP: No dyspnea, shortness of breath, cough  GI: No stool incontinence, nausea, vomiting  : No urinary incontinence  MSK: No joint swelling  SKIN: No rash, no hives  NEURO: No headache,  "dizziness  PSYCH: No anxiety, depression  HEM/LYMPH: No easy bruising or bleeding  All other systems reviewed are negative     Physical Exam:  Vitals: /64   Pulse 56   Resp 20   Ht 1.727 m (5' 8\")   Wt 105 kg (232 lb)   SpO2 93%   BMI 35.28 kg/m²   General: No apparent distress. Alert, appropriate, oriented x 3. Mood and affect normal. Speaking in full sentences.  HENT: Normocephalic, atraumatic. Hearing intact.  Eyes: Extraocular movements grossly intact. Pupils equal and round.   Neck: Supple, trachea midline.  Lungs: Symmetric respiratory excursion on visual exam, nonlabored breathing.  Extremities: No clubbing, cyanosis, or edema noted in arms or legs.  Skin: No rashes, lesions, alopecia noted on back or extremities.   MSK: No pain with left hip scour or hip FADIR maneuver  Back: Reports mild pain with extension and rotation in either direction. No pain with flexion. No tenderness over the spinous processes, lumbar facets, SIJ, or GTB bilaterally. No spasm noted over musculature. No misalignment or asymmetry noted.  Neuro: Alert and appropriate. Motor strength 5/5 throughout bilateral lower extremities. Sensory intact to light touch bilateral lower extremities. Bulk and tone within normal limits.    Laboratory Data:  The following laboratory data were reviewed during this visit:   Lab Results   Component Value Date    WBC 7.5 11/19/2024    RBC 4.59 11/19/2024    HGB 14.0 11/19/2024    HCT 46.3 11/19/2024     11/19/2024      Lab Results   Component Value Date    INR 1.0 09/23/2021     Lab Results   Component Value Date    CREATININE 3.36 (H) 11/19/2024    HGBA1C 7.9 (A) 12/04/2024       Imaging:  The following imaging impressions were reviewed by me during this visit:    -1/3/23 hip/pelvis xray shows mild left hip OA  - 9/4/19 CT lumbar spine shows fairly severe multi-level DDD, facet arthropathy, as well as central canal stenosis       I also personally reviewed the images from the above " "studies myself. These images and my interpretation of them contributed to the management and decision making of the patient's medical plan.    ASSESSMENT:  Mr. Michael Szymanski is a 79 y.o. male with low back and left hip pain that is consistent with:    1. Spinal stenosis of lumbar region with neurogenic claudication    2. Lumbar facet arthropathy        PLAN:    Diagnostics:   - I did review his lumbar spine CT from 2019 which shows fairly severe multi-level degenerative changes, including likely severe canal stenosis at multiple levels. No further diagnostics are indicated at this time but would consider a lumbar spine MRI if he decides to pursue interventional treatment.    Physical Therapy and Rehabilitation:     - Planning to start PT soon    Psychologically:  - No needs at this time    Medications  - Recommend trial of Gabapentin 100 mg nightly. Education on this medication provided today. Side effects reviewed. Given age and CKD will start at a low dose and increase gradually  - Recommend increasing to Tylenol 650 mg TID PRN    Duration  - Multiple years    Interventions:  - I suspect his \"hip pain\" is most likely due to lumbar spinal stenosis or radiculopathy.  He has no pain with hip joint provocative maneuvers and hip xrays show only mild OA. I would consider lumbar MOMO in the future. He is hesitant to do this, citing that steroids caused severe depression when he was a teenager and because he is diabetic        Sincerely,  Anthony Shukla MD  [facility] - [place]  "

## 2025-02-20 ENCOUNTER — OFFICE VISIT (OUTPATIENT)
Dept: PAIN MEDICINE | Facility: CLINIC | Age: 80
End: 2025-02-20
Payer: MEDICARE

## 2025-02-20 VITALS
HEIGHT: 68 IN | BODY MASS INDEX: 35.16 KG/M2 | HEART RATE: 56 BPM | RESPIRATION RATE: 20 BRPM | OXYGEN SATURATION: 93 % | WEIGHT: 232 LBS | DIASTOLIC BLOOD PRESSURE: 64 MMHG | SYSTOLIC BLOOD PRESSURE: 114 MMHG

## 2025-02-20 DIAGNOSIS — M47.816 LUMBAR FACET ARTHROPATHY: ICD-10-CM

## 2025-02-20 DIAGNOSIS — M48.062 SPINAL STENOSIS OF LUMBAR REGION WITH NEUROGENIC CLAUDICATION: Primary | ICD-10-CM

## 2025-02-20 PROCEDURE — 1159F MED LIST DOCD IN RCRD: CPT | Performed by: STUDENT IN AN ORGANIZED HEALTH CARE EDUCATION/TRAINING PROGRAM

## 2025-02-20 PROCEDURE — 99214 OFFICE O/P EST MOD 30 MIN: CPT | Performed by: STUDENT IN AN ORGANIZED HEALTH CARE EDUCATION/TRAINING PROGRAM

## 2025-02-20 PROCEDURE — 1157F ADVNC CARE PLAN IN RCRD: CPT | Performed by: STUDENT IN AN ORGANIZED HEALTH CARE EDUCATION/TRAINING PROGRAM

## 2025-02-20 PROCEDURE — 99204 OFFICE O/P NEW MOD 45 MIN: CPT | Performed by: STUDENT IN AN ORGANIZED HEALTH CARE EDUCATION/TRAINING PROGRAM

## 2025-02-20 PROCEDURE — 1160F RVW MEDS BY RX/DR IN RCRD: CPT | Performed by: STUDENT IN AN ORGANIZED HEALTH CARE EDUCATION/TRAINING PROGRAM

## 2025-02-20 PROCEDURE — 1036F TOBACCO NON-USER: CPT | Performed by: STUDENT IN AN ORGANIZED HEALTH CARE EDUCATION/TRAINING PROGRAM

## 2025-02-20 PROCEDURE — 3074F SYST BP LT 130 MM HG: CPT | Performed by: STUDENT IN AN ORGANIZED HEALTH CARE EDUCATION/TRAINING PROGRAM

## 2025-02-20 PROCEDURE — 3078F DIAST BP <80 MM HG: CPT | Performed by: STUDENT IN AN ORGANIZED HEALTH CARE EDUCATION/TRAINING PROGRAM

## 2025-02-20 PROCEDURE — 1125F AMNT PAIN NOTED PAIN PRSNT: CPT | Performed by: STUDENT IN AN ORGANIZED HEALTH CARE EDUCATION/TRAINING PROGRAM

## 2025-02-20 PROCEDURE — G2211 COMPLEX E/M VISIT ADD ON: HCPCS | Performed by: STUDENT IN AN ORGANIZED HEALTH CARE EDUCATION/TRAINING PROGRAM

## 2025-02-20 RX ORDER — GABAPENTIN 100 MG/1
100 CAPSULE ORAL NIGHTLY
Qty: 90 CAPSULE | Refills: 1 | Status: SHIPPED | OUTPATIENT
Start: 2025-02-20 | End: 2025-08-19

## 2025-02-20 ASSESSMENT — PATIENT HEALTH QUESTIONNAIRE - PHQ9
1. LITTLE INTEREST OR PLEASURE IN DOING THINGS: SEVERAL DAYS
2. FEELING DOWN, DEPRESSED OR HOPELESS: SEVERAL DAYS
10. IF YOU CHECKED OFF ANY PROBLEMS, HOW DIFFICULT HAVE THESE PROBLEMS MADE IT FOR YOU TO DO YOUR WORK, TAKE CARE OF THINGS AT HOME, OR GET ALONG WITH OTHER PEOPLE: SOMEWHAT DIFFICULT
SUM OF ALL RESPONSES TO PHQ9 QUESTIONS 1 AND 2: 2

## 2025-02-20 ASSESSMENT — PAIN SCALES - GENERAL
PAINLEVEL_OUTOF10: 2
PAINLEVEL_OUTOF10: 2

## 2025-02-20 ASSESSMENT — PAIN - FUNCTIONAL ASSESSMENT: PAIN_FUNCTIONAL_ASSESSMENT: 0-10

## 2025-02-20 ASSESSMENT — LIFESTYLE VARIABLES: TOTAL SCORE: 0

## 2025-02-20 ASSESSMENT — PAIN DESCRIPTION - DESCRIPTORS: DESCRIPTORS: ACHING

## 2025-02-25 ENCOUNTER — TELEPHONE (OUTPATIENT)
Dept: ENDOCRINOLOGY | Facility: CLINIC | Age: 80
End: 2025-02-25
Payer: MEDICARE

## 2025-02-25 ENCOUNTER — ANESTHESIA EVENT (OUTPATIENT)
Dept: OPERATING ROOM | Facility: HOSPITAL | Age: 80
End: 2025-02-25
Payer: MEDICARE

## 2025-02-25 RX ORDER — ALBUTEROL SULFATE 0.83 MG/ML
2.5 SOLUTION RESPIRATORY (INHALATION) ONCE AS NEEDED
Status: CANCELLED | OUTPATIENT
Start: 2025-02-25

## 2025-02-25 RX ORDER — ACETAMINOPHEN 325 MG/1
650 TABLET ORAL EVERY 4 HOURS PRN
Status: CANCELLED | OUTPATIENT
Start: 2025-02-25

## 2025-02-25 RX ORDER — ONDANSETRON HYDROCHLORIDE 2 MG/ML
8 INJECTION, SOLUTION INTRAVENOUS ONCE
Status: CANCELLED | OUTPATIENT
Start: 2025-02-25 | End: 2025-02-25

## 2025-02-25 NOTE — TELEPHONE ENCOUNTER
Pt called and stated with his insurance change he now has to use [employer] for his DME for the Dexcom G7 sensors. Stated they need paperwork sent over. Informed paperwork will be sent over today .

## 2025-02-27 ENCOUNTER — ANESTHESIA (OUTPATIENT)
Dept: OPERATING ROOM | Facility: HOSPITAL | Age: 80
End: 2025-02-27
Payer: MEDICARE

## 2025-02-27 ENCOUNTER — HOSPITAL ENCOUNTER (OUTPATIENT)
Dept: OPERATING ROOM | Facility: HOSPITAL | Age: 80
Setting detail: OUTPATIENT SURGERY
Discharge: HOME | End: 2025-02-27
Payer: MEDICARE

## 2025-02-27 VITALS
HEART RATE: 55 BPM | BODY MASS INDEX: 36.85 KG/M2 | RESPIRATION RATE: 15 BRPM | HEIGHT: 66 IN | DIASTOLIC BLOOD PRESSURE: 73 MMHG | WEIGHT: 229.28 LBS | SYSTOLIC BLOOD PRESSURE: 178 MMHG | OXYGEN SATURATION: 98 % | TEMPERATURE: 97.9 F

## 2025-02-27 DIAGNOSIS — Z91.89 ENCOUNTER FOR SCREENING FOR COLORECTAL CANCER IN HIGH RISK PATIENT: ICD-10-CM

## 2025-02-27 DIAGNOSIS — Z12.11 ENCOUNTER FOR SCREENING FOR COLORECTAL CANCER IN HIGH RISK PATIENT: ICD-10-CM

## 2025-02-27 DIAGNOSIS — Z12.12 ENCOUNTER FOR SCREENING FOR COLORECTAL CANCER IN HIGH RISK PATIENT: ICD-10-CM

## 2025-02-27 LAB
GLUCOSE BLD MANUAL STRIP-MCNC: 55 MG/DL (ref 74–99)
GLUCOSE BLD MANUAL STRIP-MCNC: 61 MG/DL (ref 74–99)
GLUCOSE BLD MANUAL STRIP-MCNC: 81 MG/DL (ref 74–99)
GLUCOSE BLD MANUAL STRIP-MCNC: 84 MG/DL (ref 74–99)

## 2025-02-27 PROCEDURE — 45385 COLONOSCOPY W/LESION REMOVAL: CPT | Performed by: SURGERY

## 2025-02-27 PROCEDURE — 3600000002 HC OR TIME - INITIAL BASE CHARGE - PROCEDURE LEVEL TWO: Performed by: ANESTHESIOLOGY

## 2025-02-27 PROCEDURE — 2720000007 HC OR 272 NO HCPCS: Performed by: ANESTHESIOLOGY

## 2025-02-27 PROCEDURE — 2500000004 HC RX 250 GENERAL PHARMACY W/ HCPCS (ALT 636 FOR OP/ED): Performed by: ANESTHESIOLOGY

## 2025-02-27 PROCEDURE — 3700000002 HC GENERAL ANESTHESIA TIME - EACH INCREMENTAL 1 MINUTE: Performed by: ANESTHESIOLOGY

## 2025-02-27 PROCEDURE — 2500000004 HC RX 250 GENERAL PHARMACY W/ HCPCS (ALT 636 FOR OP/ED): Performed by: REGISTERED NURSE

## 2025-02-27 PROCEDURE — 3600000007 HC OR TIME - EACH INCREMENTAL 1 MINUTE - PROCEDURE LEVEL TWO: Performed by: ANESTHESIOLOGY

## 2025-02-27 PROCEDURE — 3700000001 HC GENERAL ANESTHESIA TIME - INITIAL BASE CHARGE: Performed by: ANESTHESIOLOGY

## 2025-02-27 PROCEDURE — 7100000009 HC PHASE TWO TIME - INITIAL BASE CHARGE: Performed by: ANESTHESIOLOGY

## 2025-02-27 PROCEDURE — 7100000010 HC PHASE TWO TIME - EACH INCREMENTAL 1 MINUTE: Performed by: ANESTHESIOLOGY

## 2025-02-27 PROCEDURE — 82947 ASSAY GLUCOSE BLOOD QUANT: CPT

## 2025-02-27 PROCEDURE — A45385 PR COLONOSCOPY,REMV LESN,SNARE: Performed by: REGISTERED NURSE

## 2025-02-27 RX ORDER — SODIUM CHLORIDE, SODIUM LACTATE, POTASSIUM CHLORIDE, CALCIUM CHLORIDE 600; 310; 30; 20 MG/100ML; MG/100ML; MG/100ML; MG/100ML
100 INJECTION, SOLUTION INTRAVENOUS CONTINUOUS
Status: ACTIVE | OUTPATIENT
Start: 2025-02-27 | End: 2025-02-27

## 2025-02-27 RX ORDER — FENTANYL CITRATE 50 UG/ML
INJECTION, SOLUTION INTRAMUSCULAR; INTRAVENOUS AS NEEDED
Status: DISCONTINUED | OUTPATIENT
Start: 2025-02-27 | End: 2025-02-27

## 2025-02-27 RX ORDER — SODIUM CHLORIDE, SODIUM LACTATE, POTASSIUM CHLORIDE, CALCIUM CHLORIDE 600; 310; 30; 20 MG/100ML; MG/100ML; MG/100ML; MG/100ML
20 INJECTION, SOLUTION INTRAVENOUS CONTINUOUS
Status: DISCONTINUED | OUTPATIENT
Start: 2025-02-27 | End: 2025-02-28 | Stop reason: HOSPADM

## 2025-02-27 RX ORDER — PROPOFOL 10 MG/ML
INJECTION, EMULSION INTRAVENOUS AS NEEDED
Status: DISCONTINUED | OUTPATIENT
Start: 2025-02-27 | End: 2025-02-27

## 2025-02-27 RX ORDER — DEXTROSE, SODIUM CHLORIDE, SODIUM LACTATE, POTASSIUM CHLORIDE, AND CALCIUM CHLORIDE 5; .6; .31; .03; .02 G/100ML; G/100ML; G/100ML; G/100ML; G/100ML
100 INJECTION, SOLUTION INTRAVENOUS CONTINUOUS
Status: DISCONTINUED | OUTPATIENT
Start: 2025-02-27 | End: 2025-02-28 | Stop reason: HOSPADM

## 2025-02-27 RX ADMIN — PROPOFOL 60 MG: 10 INJECTION, EMULSION INTRAVENOUS at 10:23

## 2025-02-27 RX ADMIN — FENTANYL CITRATE 25 MCG: 50 INJECTION, SOLUTION INTRAMUSCULAR; INTRAVENOUS at 10:20

## 2025-02-27 RX ADMIN — SODIUM CHLORIDE, SODIUM LACTATE, POTASSIUM CHLORIDE, CALCIUM CHLORIDE AND DEXTROSE MONOHYDRATE 100 ML/HR: 5; 600; 310; 30; 20 INJECTION, SOLUTION INTRAVENOUS at 09:30

## 2025-02-27 RX ADMIN — PROPOFOL 200 MCG/KG/MIN: 10 INJECTION, EMULSION INTRAVENOUS at 10:23

## 2025-02-27 SDOH — HEALTH STABILITY: MENTAL HEALTH: CURRENT SMOKER: 0

## 2025-02-27 ASSESSMENT — PAIN SCALES - GENERAL
PAIN_LEVEL: 0
PAINLEVEL_OUTOF10: 0 - NO PAIN

## 2025-02-27 ASSESSMENT — PAIN - FUNCTIONAL ASSESSMENT
PAIN_FUNCTIONAL_ASSESSMENT: 0-10

## 2025-02-27 NOTE — PERIOPERATIVE NURSING NOTE
Suggested to patient that he call the wound clinic as he has seen them in the past, as I noted a dressing on the bottom of right lower leg. He states it is from cellulitis, a blister . I asked if he wanted me to call the wound clinic for him, he stated no. Dr Dunn later came to bedside and he was made aware, he agreed that patient should let the wound clinic know.  Patient has been advised.

## 2025-02-27 NOTE — PERIOPERATIVE NURSING NOTE
Patient arrived in pre-op with c/o feeling slightly lightheaded and some dizziness. Glucose 61, Dr Toney notified, patient  undressed and placed in bed IV started of D5%LR as ordered. 100cc bolus then 100 per hour Recheck of glucose at 0953 was 84 and patient without complaints Dr Trinidad notified, fluid rate down to 50cc/hr.

## 2025-02-27 NOTE — ANESTHESIA POSTPROCEDURE EVALUATION
Patient: Michael Szymanski    Procedure Summary       Date: 02/27/25 Room / Location: [facility] OR    Anesthesia Start: 1018 Anesthesia Stop: 1109    Procedure: COLONOSCOPY Diagnosis: Encounter for screening for colorectal cancer in high risk patient    Scheduled Providers: Sunny Dunn MD; Cj Toney MD Responsible Provider: Cj Toney MD    Anesthesia Type: MAC ASA Status: 3            Anesthesia Type: MAC    Vitals Value Taken Time   /73 02/27/25 1145   Temp 36.6 °C (97.9 °F) 02/27/25 1106   Pulse 55 02/27/25 1145   Resp 15 02/27/25 1145   SpO2 98 % 02/27/25 1145       Anesthesia Post Evaluation    Patient location during evaluation: bedside  Patient participation: complete - patient participated  Level of consciousness: awake and alert  Pain score: 0  Pain management: adequate  Airway patency: patent  Cardiovascular status: acceptable  Respiratory status: acceptable  Hydration status: acceptable  Postoperative Nausea and Vomiting: none    There were no known notable events for this encounter.

## 2025-02-27 NOTE — DISCHARGE INSTRUCTIONS
Patient Instructions after a Colonoscopy      The anesthetics, sedatives or narcotics which were given to you today will be acting in your body for the next 24 hours, so you might feel a little sleepy or groggy.  This feeling should slowly wear off. Carefully read and follow the instructions.     You received sedation today:  - Do not drive or operate any machinery or power tools of any kind.   - No alcoholic beverages today, not even beer or wine.  - Do not make any important decisions or sign any legal documents.  - No over the counter medications that contain alcohol or that may cause drowsiness.  - Do not make any important decisions or sign any legal documents.  - Make sure you have someone with you for first 24 hours.    While it is common to experience mild to moderate abdominal distention, gas, or belching after your procedure, if any of these symptoms occur following discharge from the GI Lab or within one week of having your procedure, call the [facility] to be advised whether a visit to your nearest Urgent Care or Emergency Department is indicated.  Take this paper with you if you go.     - If you develop an allergic reaction to the medications that were given during your procedure such as difficulty breathing, rash, hives, severe nausea, vomiting or lightheadedness.  - If you experience chest pain, shortness of breath, severe abdominal pain, fevers and chills.  -If you develop signs and symptoms of bleeding such as blood in your spit, if your stools turn black, tarry, or bloody  - If you have not urinated within 8 hours following your procedure.  - If your IV site becomes painful, red, inflamed, or looks infected.    If you received a biopsy/polypectomy/sphincterotomy the following instructions apply below:    __ Do not use Aspirin containing products, non-steroidal medications or anti-coagulants for one week following your procedure. (Examples of these types of medications are: Advil,  Arthrotec, Aleve, Coumadin, Ecotrin, Heparin, Ibuprofen, Indocin, Motrin, Naprosyn, Nuprin, Plavix, Vioxx, and Voltarin, or their generic forms.  This list is not all-inclusive.  Check with your physician or pharmacist before resuming medications.)   __ Eat a soft diet today.  Avoid foods that are poorly digested for the next 24 hours.  These foods would include: nuts, beans, lettuce, red meats, and fried foods. Start with liquids and advance your diet as tolerated, gradually work up to eating solids.   __ Do not have a Barium Study or Enema for one week.    Your physician recommends the additional following instructions:    -You have a contact number available for emergencies. The signs and symptoms of potential delayed complications were discussed with you. You may return to normal activities tomorrow.  -Resume your previous diet.  -Continue your present medications.   -We are waiting for your pathology results.  -Your physician has recommended a repeat colonoscopy (date to be determined after pending pathology results are reviewed) for surveillance based on pathology results.  -The findings and recommendations have been discussed with you.  -The findings and recommendations were discussed with your family.  - Please see Medication Reconciliation Form for new medication/medications prescribed.       If you experience any problems or have any questions following discharge from the GI Lab, please call:        Nurse Signature                                                                        Date___________________                                                                            Patient/Responsible Party Signature                                        Date___________________

## 2025-02-27 NOTE — ANESTHESIA PREPROCEDURE EVALUATION
Patient: Michael Szymanski    Procedure Information       Date/Time: 02/27/25 1010    Scheduled providers: Sunny Dunn MD; Cj Toney MD    Procedure: COLONOSCOPY    Location: [facility] OR            Relevant Problems   Anesthesia (within normal limits)      Cardiac (within normal limits)      Pulmonary (within normal limits)      Neuro (within normal limits)      GI (within normal limits)      /Renal   (+) Prostate cancer (Multi)      Liver (within normal limits)      Endocrine   (+) Diabetic renal disease (Multi)   (+) Hyperparathyroidism due to renal insufficiency (Multi)   (+) Type 2 diabetes mellitus with nephropathy (Multi)      Hematology   (+) Anemia   (+) Anemia of chronic renal failure      Musculoskeletal   (+) Degenerative disc disease, lumbar      HEENT (within normal limits)      ID (within normal limits)      Skin (within normal limits)      GYN (within normal limits)       Clinical information reviewed:   Tobacco  Allergies  Meds  Problems  Med Hx  Surg Hx   Fam Hx  Soc   Hx        NPO Detail:  NPO/Void Status  Date of Last Liquid: 02/27/25  Time of Last Liquid: 0500  Date of Last Solid: 02/25/25  Time of Last Solid: 1600  Last Intake Type: Clear fluids; Food  Time of Last Void: 0800         Physical Exam    Airway  Mallampati: II  TM distance: >3 FB  Neck ROM: full     Cardiovascular - normal exam     Dental - normal exam     Pulmonary - normal exam     Abdominal - normal exam         Anesthesia Plan    History of general anesthesia?: yes  History of complications of general anesthesia?: no    ASA 3     general     The patient is not a current smoker.  Patient was not previously instructed to abstain from smoking on day of procedure.  Patient did not smoke on day of procedure.    intravenous induction   Anesthetic plan and risks discussed with patient.  Use of blood products discussed with patient who consented to blood products.

## 2025-03-04 NOTE — H&P
General Surgery History and Physical    Referring Provider:  DO Tyron Avina Peter E, DO     Chief Complaint:  Colonoscopy    History of Present Illness:  This is a 79 y.o. male who presents for a colonoscopy.    Past Medical History:  Past Medical History:   Diagnosis Date    Acute pansinusitis, unspecified 08/06/2019    Acute non-recurrent pansinusitis    Benign prostatic hyperplasia without lower urinary tract symptoms     BPH (benign prostatic hypertrophy)    Cellulitis of left lower limb 10/08/2021    Cellulitis of left lower extremity    Class 2 obesity with body mass index (BMI) of 37.0 to 37.9 in adult 02/27/2025    Encounter for immunization 03/21/2017    Encounter for administration of vaccine    Encounter for screening for colorectal cancer in high risk patient     Hyperlipidemia, unspecified 12/10/2015    Hypertensive chronic kidney disease with stage 1 through stage 4 chronic kidney disease, or unspecified chronic kidney disease 12/09/2021    Benign hypertension with CKD (chronic kidney disease) stage III    Klinefelter syndrome, unspecified ([facility])     History of Klinefelter syndrome    Localized edema 09/29/2021    Edema of extremity    Low back pain, unspecified 04/25/2018    Low back pain with radiation    Morbid (severe) obesity due to excess calories (Multi) 05/14/2020    Obesity, Class III, BMI 40-49.9 (morbid obesity)    Morbid (severe) obesity due to excess calories (Multi) 12/11/2021    Morbid obesity with BMI of 40.0-44.9, adult    Obesity, unspecified 08/07/2019    Obesity, Class II, BMI 35-39.9    Other abnormal glucose     Hemoglobin A1c greater than 9.0%    Other conditions influencing health status 03/12/2022    Uncontrolled diabetes mellitus with diabetic nephropathy    Other conditions influencing health status 03/12/2022    Uncontrolled diabetes mellitus with diabetic nephropathy    Other specified abnormal findings of blood chemistry 02/13/2020    Low vitamin D level     Other specified symptoms and signs involving the circulatory and respiratory systems 12/31/2018    Prolonged capillary refill time    Other symptoms and signs involving the musculoskeletal system 04/25/2018    Weakness of lower extremity    Other tear of medial meniscus, current injury, unspecified knee, initial encounter     MMT (medial meniscus tear)    Personal history of diseases of the skin and subcutaneous tissue 08/07/2019    History of partial thickness sunburn    Personal history of other diseases of male genital organs     History of prostatitis    Personal history of other diseases of the circulatory system     History of hypertension    Personal history of other diseases of the musculoskeletal system and connective tissue     History of osteoarthritis    Personal history of other diseases of the musculoskeletal system and connective tissue     History of bursitis    Personal history of other diseases of the nervous system and sense organs     History of cataract    Personal history of other diseases of the respiratory system 06/25/2018    History of acute bronchitis    Personal history of other diseases of the respiratory system     History of bronchitis    Personal history of other diseases of urinary system 04/19/2017    History of hematuria    Personal history of other endocrine, nutritional and metabolic disease     History of hyperlipidemia    Personal history of other endocrine, nutritional and metabolic disease     History of obesity    Personal history of other endocrine, nutritional and metabolic disease     History of diabetic retinopathy    Personal history of other medical treatment     History of stress test    Personal history of other specified conditions 05/14/2020    History of edema    Personal history of other specified conditions 10/21/2021    History of fatigue    Personal history of other specified conditions 06/19/2013    History of elevated prostate specific antigen (PSA)     Personal history of urinary (tract) infections 01/13/2022    History of urinary tract infection    Proteinuria, unspecified     Proteinuria    Spondylosis without myelopathy or radiculopathy, cervical region     Degenerative joint disease of cervical spine    Sprain of ribs, initial encounter 09/09/2021    Sprain of ribs    Sprain of ribs, initial encounter 09/09/2021    Sprain of costal cartilage, initial encounter    Strain of muscle, fascia and tendon of lower back, subsequent encounter 09/10/2019    Lumbosacral strain, subsequent encounter    Type 2 diabetes mellitus with mild nonproliferative diabetic retinopathy without macular edema, bilateral 12/09/2021    Type 2 diabetes mellitus with mild nonproliferative diabetic retinopathy without macular edema, bilateral    Varicose veins of unspecified lower extremity with ulcer of unspecified site (CODE) 10/08/2021    Venous stasis ulcer with varicose veins of lower extremity        Past Surgical History:  Past Surgical History:   Procedure Laterality Date    CATARACT EXTRACTION  06/19/2013    Cataract Surgery    CHOLECYSTECTOMY  02/06/2017    Cholecystectomy    COLONOSCOPY  02/06/2017    Colonoscopy    CYSTOSCOPY  02/06/2017    Diagnostic Cystoscopy    ESOPHAGOGASTRODUODENOSCOPY  02/06/2017    Esophagogastroduodenoscopy With Biopsy    GALLBLADDER SURGERY  06/19/2013    Gallbladder Surgery    KNEE SURGERY  02/06/2017    Knee Surgery Left    KNEE SURGERY  02/06/2017    Knee Surgery Right    PROSTATECTOMY  06/19/2013    Prostatectomy Perineal Radical    TONSILLECTOMY  05/29/2013    Tonsillectomy        Medications:  Current Outpatient Medications   Medication Instructions    acetaminophen (Tylenol) 325 mg tablet 1 tablet as needed    amLODIPine (NORVASC) 10 mg, oral, Every 24 hours    aspirin 81 mg chewable tablet 1 tablet, Daily    blood-glucose sensor (Dexcom G7 Sensor) device For continuous glucose monitoring.  Change every 10 days.    calcitriol (Rocaltrol) 0.5 mcg  "capsule 1 capsule, Every 24 hours    Dexcom G4 platinum  (Dexcom G7 ) misc For continuous glucose monitoring.    FreeStyle Lite Strips strip For glucose testing 3 times daily    gabapentin (NEURONTIN) 100 mg, oral, Nightly    insulin lispro (HUMALOG KWIKPEN INSULIN) 4-10 Units, subcutaneous, 3 times daily before meals, Take as directed per insulin instructions.    Lantus U-100 Insulin 62-65 Units, subcutaneous, Nightly    metoprolol succinate XL (TOPROL-XL) 50 mg, oral, Daily    omega 3-dha-epa-fish oil (Fish OiL) 1,200 (144-216) mg capsule Fish Oil 1200 MG Oral Capsule  Refills: 0  Start: 6-May-2021    omeprazole 20 mg tablet,disintegrat, delay rel Take by mouth.    pen needle, diabetic (Novofine 32) 32 gauge x 1/4\" needle 3-4 times daily    pravastatin (Pravachol) 20 mg tablet take 1 tablet by mouth ONCE every 24 hours    testosterone (ANDROGEL) 50 mg, transdermal, Daily    torsemide (Demadex) 20 mg tablet 1 tablet, Daily        Allergies:  Allergies   Allergen Reactions    Cortisone Unknown    Penicillins Rash and Unknown        Family History:  Family History   Problem Relation Name Age of Onset    Pancreatic cancer Mother      Diabetes Mother      Heart disease Mother      Kidney disease Father      Hearing loss Brother      Other (elevated psa) Brother          serum    Other (cardiac disorder) Maternal Grandmother      Other (cardiac disorder) Maternal Grandfather      Other (cardiac disorder) Paternal Grandmother      Other (cardiac disorder) Paternal Grandfather          Social History:  Social History     Socioeconomic History    Marital status: Single   Tobacco Use    Smoking status: Never     Passive exposure: Never    Smokeless tobacco: Never   Vaping Use    Vaping status: Never Used   Substance and Sexual Activity    Alcohol use: Never    Drug use: Never    Sexual activity: Defer        Review of Systems:  A complete 12 point review of systems was performed and is negative except as " "noted in the history of present illness.      Vital Signs:  Vitals:    02/27/25 0917   BP: 159/78   Pulse: 63   Temp: 37.2 °C (99 °F)   SpO2: 98%          Physical Exam:  General: No acute distress. Sitting up in bed.   Neuro: Alert and oriented ×3. Follows commands.  Head: Atraumatic  Eyes: Pupils equal reactive to light. Extraocular motions intact.  Ears: Hears normal speaking voice.  Mouth, Nose, Throat: Mucous membranes moist.  Normal dentition.  Neck: Supple. No appreciable masses.  Chest: No crepitus.    Heart: Regular rate and rhythm.  Lung: Clear to auscultation bilaterally.  Vascular: No carotid bruits.  Palpable radial pulses bilaterally.  Abdomen: Soft. Nondistended. Nontender.    Musculoskeletal: Moves all extremities.  Normal range of motion.  Lymphatic: No palpable lymph nodes.  Skin: No rashes or lesions.  Psychological: Normal affect      Laboratory Values:  CBC: No results found for: \"WBC\", \"RBC\", \"HGB\", \"HCT\", \"PLT\"    RFP: No results found for: \"GLUF\", \"NA\", \"K\", \"CL\", \"CO2\", \"BUN\", \"CREATININE\", \"CALCIUM\", \"MG\", \"PHOS\"     LFTs: No results found for: \"PROT\", \"ALBUMIN\", \"BILITOT\", \"BILIDIR\", \"ALKPHOS\", \"AST\", \"ALT\"         Assessment:  This is a 79 y.o. male who presents for a colonoscopy.      Plan:  -- Colonoscopy.      Carlos Dunn MD  General Surgery  Office: [phone]  Fax:     [phone]  10:01 AM   02/27/25     " 5/1/2022

## 2025-03-05 ENCOUNTER — EVALUATION (OUTPATIENT)
Dept: PHYSICAL THERAPY | Facility: CLINIC | Age: 80
End: 2025-03-05
Payer: MEDICARE

## 2025-03-05 DIAGNOSIS — M25.552 HIP PAIN, LEFT: Primary | ICD-10-CM

## 2025-03-05 DIAGNOSIS — R29.898 WEAKNESS OF BOTH LOWER EXTREMITIES: ICD-10-CM

## 2025-03-05 DIAGNOSIS — M54.42 CHRONIC BILATERAL LOW BACK PAIN WITH LEFT-SIDED SCIATICA: ICD-10-CM

## 2025-03-05 DIAGNOSIS — G89.29 CHRONIC BILATERAL LOW BACK PAIN WITH LEFT-SIDED SCIATICA: ICD-10-CM

## 2025-03-05 PROCEDURE — 97163 PT EVAL HIGH COMPLEX 45 MIN: CPT | Mod: GP | Performed by: PHYSICAL THERAPIST

## 2025-03-05 ASSESSMENT — ENCOUNTER SYMPTOMS
LOSS OF SENSATION IN FEET: 0
DEPRESSION: 0
OCCASIONAL FEELINGS OF UNSTEADINESS: 1

## 2025-03-05 NOTE — PROGRESS NOTES
Physical Therapy    Physical Therapy Evaluation and Treatment      Patient Name: Michael Szymanski  MRN: [id number]  Today's Date: 3/5/2025    Time Entry:   Time Calculation  Start Time: 1100  Stop Time: 1200  Time Calculation (min): 60 min  PT Evaluation Time Entry  PT Evaluation (Complex) Time Entry: 50  PT Therapeutic Procedures Time Entry  Therapeutic Exercise Time Entry: 5               Assessment:  Patient with c/o L lateral hip pain primarily with standing >> 10 minutes and with walking, significant postural deficits, weakness of B LE's / Core mm, +foot drop on R LE, unsteady gait, and standing balance deficits.  Patient also has difficulty with stair climbing, using HR.  Patient would benefit from PT services to improve all of the above.      Plan:  To continue with PT treatment 2x/week for 4-6 weeks for Therapeutic exercise, Therapeutic Activities, Neuro Re-Education, Manual therapy, Modalities PRN, and Patient Education / instruction in HEP.      Current Problem:   1. Hip pain, left        2. Chronic bilateral low back pain with left-sided sciatica  Referral to Physical Therapy    Follow Up In Physical Therapy      3. Weakness of both lower extremities          Subjective  Patient reports he has had L lateral hip pain for ~ 3 yrs now.  Uses a standard cane for short to moderate distances.  Patient able to do stairs at home using handrails. States he goes down the stairs backwards.    General:  Insurance - [facility], Authorization required.     Precautions:  HTN controlled with BP meds,  DM II, Kidney Stage IV, h/o TKR's bilateral.   STEADI = 4 points (4 points or >> indicates increased risk for falls.)     Pain:  Pain primarily over lateral aspect of L Hip and occasionally over his R Hip.  Intermittent pain along the center of his lower back.       Home Living:  Patient lives on 2nd floor of his [employer]  business.  Patient has stairs with HR.  States he sleeps in his bed but sometimes falls asleep in his chair  watching TV.     Prior Level of Function:  Modified Independent, using cane for ~ 3 yrs now.       Objective   Cognition:  Good, patient still working as a .    Functional Assessments:  Gait:  Patient ambulating with a standard cane short to moderate distances, using cane in R hand, unsteady gait.  Rounded posture, decreased stride with R LE, with decreased R Heel strike due to +foot drop.  Patient states he can walk around a store with a shopping cart for at least 1/2 hour.  Recommended he use a walker for longer distances and out in community due to high risk for falls.    Posture:  Severely rounded posture, forward head / shoulder's, mild scoliosis - prominent R lower thoracic / L lumbar.    Extremity/Trunk Assessments:  LE AROM:  Seated -  Hip flexion WFL, c/o pain on L Hip  Hip abduction WFL bilateral, no c/o pain  Hip adduction WFL bilateral  Hip extension lacks ~ 5-10 degrees bilateral  Hip ER WFL L/R  Hip IR WFL L/R  Knee extension lacks ~5 degrees on L knee and ~15 degrees on R Knee, no c/o pain  Knee flexion ~110 degrees, no c/o pain  Ankle DF L ~10 degrees and R with +foot drop    Strength of B LE's  Hip flexion 4/5 L/R  Hip extension at least 2+/5 L/R with bridge  Hip abduction R 3-/5 L 2+/5  Hip adduction 4/5 L/R  Hip ER 4/5 L/R  Hip IR 4/5 L/R  Knee extension 4/5 L/R  Knee flexion 4/5 L/R  Ankle DF L 4-/5  R 1/5 (+foot drop)  Ankle PF 4-/5 L/R  Core and Back mm = Poor    Trunk ROM in sitting - WFL except for back extension, unable to come to full upright / neutral position.  No c/o pain with Trunk ROM.    Standing Balance:  Romberg = 0/6 (patient can stand with feet normal width apart, but not with feet together without losing his balance)    Posture:  Severely rounded posture, forward head / shoulder's, mild scoliosis - prominent R lower thoracic and prominent L lumbar.    Outcome Measures:  CORNELIUS = 12 points / 24%    Treatments:  Therapeutic exercises (5 minutes)  -Side lying clams 1 x 10  reps L/R    EDUCATION:  Initial HEP of Side lying clams 2 x 10 reps L/R, to be done 1x/day.  Discussed PT POC with patient, to progress exercises for gentle stretching and strengthening of B LE's, Trunk mm, and Standing Balance to decrease fall risk.  Recommended using a walker with ambulation.  May benefit from AFO for Right foot.     Goals:  Active       PT Problem       Patient will report decrease in pain level  by >/= 80% in order to perform standing and walking, ADLs, IADLs,  and leisure/work/household activities with minimal to no restrictions.        Start:  03/05/25    Expected End:  05/28/25            Patient will demonstrate independence and compliance with safe and recommended  HEP in order to maximize and maintain functional gains made in physical therapy.        Start:  03/05/25    Expected End:  05/28/25            Patient will increase core and B hip, knees, and ankle strength by 1/3-2/3 muscle grade MMT  in order to improve standing and walking tolerance,and participate without restriction in ADL, IADL, leisure activities, household and/or work tasks.        Start:  03/05/25    Expected End:  05/28/25            Patient will improve standing balance as evident by a score of 3 / 6 or greater on Romberg in order to increase stability in gait, decrease risk of falls and improve safety with functional mobility and ADLs.        Start:  03/05/25    Expected End:  05/28/25            Patient with improved activity tolerance with standing and walking using appropriate assistive device, and improved gait pattern due to increased strength and balance.       Start:  03/05/25    Expected End:  05/28/25

## 2025-03-05 NOTE — Clinical Note
March 5, 2025    [place]  [address] [facility] [address]    Patient: Michael Syzmanski   YOB: 1945   Date of Visit: 3/5/2025       Dear Robinson Ackerman DO  [address] [facility]  [address]  [place] [address]    The attached plan of care is being sent to you because your patient’s medical reimbursement requires that you certify the plan of care. Your signature is required to allow uninterrupted insurance coverage.      You may indicate your approval by signing below and faxing this form back to us at Dept Fax: [phone].    Please call Dept: [phone] with any questions or concerns.    Thank you for this referral,        Chantelle Clemens PT  [facility] [address] [facility]  [address] [facility] [address]    Payer: Payor: [employer] / Plan: [employer] MEDICARE / Product Type: *No Product type* /                                                                         Date:     Dear Chantelle Clemens, PT,     Re: Mr. Michael Szymanski, MRN:[id number]    I certify that I have reviewed the attached plan of care and it is medically necessary for Mr. Michael Szymanski (1945) who is under my care.          ______________________________________                    _________________  Provider name and credentials                                           Date and time                                                                                           Plan of Care 3/5/25   Effective from: 3/5/2025  Effective to: 5/28/2025    Plan ID: [id number]            Participants as of Finalize on 3/5/2025    Name Type Comments Contact Info    Robinson Ackerman DO PCP - General  [phone]    Chantelle Clemens PT Physical Therapist  [phone]       Last Plan Note     Author: Chatnelle Clemens PT Status: Signed Last edited: 3/5/2025 11:00 AM       Physical Therapy    Physical Therapy Evaluation and Treatment      Patient Name: Michael Szymanski  MRN: [id number]  Today's Date:  3/5/2025    Time Entry:   Time Calculation  Start Time: 1100  Stop Time: 1200  Time Calculation (min): 60 min  PT Evaluation Time Entry  PT Evaluation (Complex) Time Entry: 50  PT Therapeutic Procedures Time Entry  Therapeutic Exercise Time Entry: 5               Assessment:  Patient with c/o L lateral hip pain primarily with standing >> 10 minutes and with walking, significant postural deficits, weakness of B LE's / Core mm, +foot drop on R LE, unsteady gait, and standing balance deficits.  Patient also has difficulty with stair climbing, using HR.  Patient would benefit from PT services to improve all of the above.      Plan:  To continue with PT treatment 2x/week for 4-6 weeks for Therapeutic exercise, Therapeutic Activities, Neuro Re-Education, Manual therapy, Modalities PRN, and Patient Education / instruction in HEP.      Current Problem:   1. Hip pain, left        2. Chronic bilateral low back pain with left-sided sciatica  Referral to Physical Therapy    Follow Up In Physical Therapy      3. Weakness of both lower extremities          Subjective  Patient reports he has had L lateral hip pain for ~ 3 yrs now.  Uses a standard cane for short to moderate distances.  Patient able to do stairs at home using handrails. States he goes down the stairs backwards.    General:  Insurance - [facility], Authorization required.     Precautions:  HTN controlled with BP meds,  DM II, Kidney Stage IV, h/o TKR's bilateral.   STEADI = 4 points (4 points or >> indicates increased risk for falls.)     Pain:  Pain primarily over lateral aspect of L Hip and occasionally over his R Hip.  Intermittent pain along the center of his lower back.       Home Living:  Patient lives on 2nd floor of his [employer]  business.  Patient has stairs with HR.  States he sleeps in his bed but sometimes falls asleep in his chair watching TV.     Prior Level of Function:  Modified Independent, using cane for ~ 3 yrs now.       Objective   Cognition:   Good, patient still working as a .    Functional Assessments:  Gait:  Patient ambulating with a standard cane short to moderate distances, using cane in R hand, unsteady gait.  Rounded posture, decreased stride with R LE, with decreased R Heel strike due to +foot drop.  Patient states he can walk around a store with a shopping cart for at least 1/2 hour.  Recommended he use a walker for longer distances and out in community due to high risk for falls.    Posture:  Severely rounded posture, forward head / shoulder's, mild scoliosis - prominent R lower thoracic / L lumbar.    Extremity/Trunk Assessments:  LE AROM:  Seated -  Hip flexion WFL, c/o pain on L Hip  Hip abduction WFL bilateral, no c/o pain  Hip adduction WFL bilateral  Hip extension lacks ~ 5-10 degrees bilateral  Hip ER WFL L/R  Hip IR WFL L/R  Knee extension lacks ~5 degrees on L knee and ~15 degrees on R Knee, no c/o pain  Knee flexion ~110 degrees, no c/o pain  Ankle DF L ~10 degrees and R with +foot drop    Strength of B LE's  Hip flexion 4/5 L/R  Hip extension at least 2+/5 L/R with bridge  Hip abduction R 3-/5 L 2+/5  Hip adduction 4/5 L/R  Hip ER 4/5 L/R  Hip IR 4/5 L/R  Knee extension 4/5 L/R  Knee flexion 4/5 L/R  Ankle DF L 4-/5  R 1/5 (+foot drop)  Ankle PF 4-/5 L/R  Core and Back mm = Poor    Trunk ROM in sitting - WFL except for back extension, unable to come to full upright / neutral position.  No c/o pain with Trunk ROM.    Standing Balance:  Romberg = 0/6 (patient can stand with feet normal width apart, but not with feet together without losing his balance)    Posture:  Severely rounded posture, forward head / shoulder's, mild scoliosis - prominent R lower thoracic and prominent L lumbar.    Outcome Measures:  CORNELIUS = 12 points / 24%    Treatments:  Therapeutic exercises (5 minutes)  -Side lying clams 1 x 10 reps L/R    EDUCATION:  Initial HEP of Side lying clams 2 x 10 reps L/R, to be done 1x/day.  Discussed PT POC with patient,  to progress exercises for gentle stretching and strengthening of B LE's, Trunk mm, and Standing Balance to decrease fall risk.  Recommended using a walker with ambulation.  May benefit from AFO for Right foot.     Goals:  Active       PT Problem       Patient will report decrease in pain level  by >/= 80% in order to perform standing and walking, ADLs, IADLs,  and leisure/work/household activities with minimal to no restrictions.        Start:  03/05/25    Expected End:  05/28/25            Patient will demonstrate independence and compliance with safe and recommended  HEP in order to maximize and maintain functional gains made in physical therapy.        Start:  03/05/25    Expected End:  05/28/25            Patient will increase core and B hip, knees, and ankle strength by 1/3-2/3 muscle grade MMT  in order to improve standing and walking tolerance,and participate without restriction in ADL, IADL, leisure activities, household and/or work tasks.        Start:  03/05/25    Expected End:  05/28/25            Patient will improve standing balance as evident by a score of 3 / 6 or greater on Romberg in order to increase stability in gait, decrease risk of falls and improve safety with functional mobility and ADLs.        Start:  03/05/25    Expected End:  05/28/25            Patient with improved activity tolerance with standing and walking using appropriate assistive device, and improved gait pattern due to increased strength and balance.       Start:  03/05/25    Expected End:  05/28/25                             Current Participants as of 3/5/2025    Name Type Comments Contact Info    Robinson Ackerman DO PCP - General  [phone]    Signature pending    Chantelle Clemens PT Physical Therapist  [phone]    Electronically signed by Chantelle Clemens PT at 3/5/2025 1327 EST

## 2025-03-07 LAB
LAB AP ASR DISCLAIMER: NORMAL
LABORATORY COMMENT REPORT: NORMAL
PATH REPORT.ADDENDUM SPEC: NORMAL
PATH REPORT.COMMENTS IMP SPEC: NORMAL
PATH REPORT.FINAL DX SPEC: NORMAL
PATH REPORT.GROSS SPEC: NORMAL
PATH REPORT.RELEVANT HX SPEC: NORMAL
PATH REPORT.TOTAL CANCER: NORMAL

## 2025-03-19 ENCOUNTER — OFFICE VISIT (OUTPATIENT)
Dept: SURGERY | Facility: CLINIC | Age: 80
End: 2025-03-19
Payer: MEDICARE

## 2025-03-19 ENCOUNTER — APPOINTMENT (OUTPATIENT)
Dept: ENDOCRINOLOGY | Facility: CLINIC | Age: 80
End: 2025-03-19
Payer: MEDICARE

## 2025-03-19 VITALS
SYSTOLIC BLOOD PRESSURE: 143 MMHG | BODY MASS INDEX: 35.99 KG/M2 | DIASTOLIC BLOOD PRESSURE: 69 MMHG | HEART RATE: 57 BPM | WEIGHT: 223 LBS

## 2025-03-19 DIAGNOSIS — E11.9 TYPE 2 DIABETES MELLITUS WITHOUT COMPLICATION, WITH LONG-TERM CURRENT USE OF INSULIN (MULTI): ICD-10-CM

## 2025-03-19 DIAGNOSIS — C18.2 MALIGNANT NEOPLASM OF ASCENDING COLON (MULTI): Primary | ICD-10-CM

## 2025-03-19 DIAGNOSIS — Z79.4 TYPE 2 DIABETES MELLITUS WITHOUT COMPLICATION, WITH LONG-TERM CURRENT USE OF INSULIN (MULTI): ICD-10-CM

## 2025-03-19 LAB
ELECTRONICALLY SIGNED BY: NORMAL
LAB AP ASR DISCLAIMER: NORMAL
LABORATORY COMMENT REPORT: NORMAL
MLH1 METHYLATION RESULT: NORMAL
PATH REPORT.ADDENDUM SPEC: NORMAL
PATH REPORT.ADDENDUM SPEC: NORMAL
PATH REPORT.COMMENTS IMP SPEC: NORMAL
PATH REPORT.FINAL DX SPEC: NORMAL
PATH REPORT.GROSS SPEC: NORMAL
PATH REPORT.RELEVANT HX SPEC: NORMAL
PATH REPORT.TOTAL CANCER: NORMAL
POC HEMOGLOBIN A1C: 7.2 % (ref 4.2–6.5)

## 2025-03-19 PROCEDURE — 1157F ADVNC CARE PLAN IN RCRD: CPT | Performed by: INTERNAL MEDICINE

## 2025-03-19 PROCEDURE — 3078F DIAST BP <80 MM HG: CPT | Performed by: INTERNAL MEDICINE

## 2025-03-19 PROCEDURE — 3077F SYST BP >= 140 MM HG: CPT | Performed by: INTERNAL MEDICINE

## 2025-03-19 PROCEDURE — 99214 OFFICE O/P EST MOD 30 MIN: CPT | Performed by: INTERNAL MEDICINE

## 2025-03-19 PROCEDURE — G2211 COMPLEX E/M VISIT ADD ON: HCPCS | Performed by: INTERNAL MEDICINE

## 2025-03-19 PROCEDURE — 1159F MED LIST DOCD IN RCRD: CPT | Performed by: INTERNAL MEDICINE

## 2025-03-19 PROCEDURE — 99214 OFFICE O/P EST MOD 30 MIN: CPT | Performed by: SURGERY

## 2025-03-19 PROCEDURE — 1160F RVW MEDS BY RX/DR IN RCRD: CPT | Performed by: INTERNAL MEDICINE

## 2025-03-19 PROCEDURE — 1036F TOBACCO NON-USER: CPT | Performed by: INTERNAL MEDICINE

## 2025-03-19 PROCEDURE — 83036 HEMOGLOBIN GLYCOSYLATED A1C: CPT | Performed by: INTERNAL MEDICINE

## 2025-03-19 PROCEDURE — 1157F ADVNC CARE PLAN IN RCRD: CPT | Performed by: SURGERY

## 2025-03-19 RX ORDER — INSULIN GLARGINE 100 [IU]/ML
60 INJECTION, SOLUTION SUBCUTANEOUS NIGHTLY
Qty: 60 ML | Refills: 3 | Status: SHIPPED | OUTPATIENT
Start: 2025-03-19 | End: 2026-03-19

## 2025-03-19 RX ORDER — BLOOD-GLUCOSE SENSOR
EACH MISCELLANEOUS
Qty: 3 EACH | Refills: 11 | Status: SHIPPED | OUTPATIENT
Start: 2025-03-19

## 2025-03-19 RX ORDER — INSULIN LISPRO 100 [IU]/ML
6-8 INJECTION, SOLUTION INTRAVENOUS; SUBCUTANEOUS
Qty: 30 ML | Refills: 3 | Status: SHIPPED | OUTPATIENT
Start: 2025-03-19

## 2025-03-19 ASSESSMENT — ENCOUNTER SYMPTOMS
NAUSEA: 0
BACK PAIN: 1
VOMITING: 0
SHORTNESS OF BREATH: 0
DIARRHEA: 0
CONSTIPATION: 0
POLYDIPSIA: 0
ABDOMINAL PAIN: 0
SORE THROAT: 0
COUGH: 0
HEADACHES: 0
APPETITE CHANGE: 0
FREQUENCY: 0
ARTHRALGIAS: 1
FEVER: 0
NERVOUS/ANXIOUS: 0

## 2025-03-19 NOTE — PROGRESS NOTES
History Of Present Illness  Michael Szymanski is a 79 y.o. male     Duration of type 2 diabetes mellitus:  25 years  Complications:  Chronic kidney disease     Right leg wound, healed per patient    Planning colon resection    Lantus 62 units at bedtime     Humalog still taking after meals, 6-8 units  He has difficulty choosing a dose before eating     DexCom G7, off x1 month due to non-delivery of sensors from [facility]  Patient is testing glucose 3-4 times daily off CGM  Records reviewed, on file    Last eye exam:  8/6/24    Past Medical History  He has a past medical history of Acute pansinusitis, unspecified (08/06/2019), Benign prostatic hyperplasia without lower urinary tract symptoms, Cellulitis of left lower limb (10/08/2021), Class 2 obesity with body mass index (BMI) of 37.0 to 37.9 in adult (02/27/2025), Encounter for immunization (03/21/2017), Encounter for screening for colorectal cancer in high risk patient, Hyperlipidemia, unspecified (12/10/2015), Hypertensive chronic kidney disease with stage 1 through stage 4 chronic kidney disease, or unspecified chronic kidney disease (12/09/2021), Klinefelter syndrome, unspecified ([facility]), Localized edema (09/29/2021), Low back pain, unspecified (04/25/2018), Morbid (severe) obesity due to excess calories (Multi) (05/14/2020), Morbid (severe) obesity due to excess calories (Multi) (12/11/2021), Obesity, unspecified (08/07/2019), Other abnormal glucose, Other conditions influencing health status (03/12/2022), Other conditions influencing health status (03/12/2022), Other specified abnormal findings of blood chemistry (02/13/2020), Other specified symptoms and signs involving the circulatory and respiratory systems (12/31/2018), Other symptoms and signs involving the musculoskeletal system (04/25/2018), Other tear of medial meniscus, current injury, unspecified knee, initial encounter, Personal history of diseases of the skin and subcutaneous tissue (08/07/2019),  Personal history of other diseases of male genital organs, Personal history of other diseases of the circulatory system, Personal history of other diseases of the musculoskeletal system and connective tissue, Personal history of other diseases of the musculoskeletal system and connective tissue, Personal history of other diseases of the nervous system and sense organs, Personal history of other diseases of the respiratory system (06/25/2018), Personal history of other diseases of the respiratory system, Personal history of other diseases of urinary system (04/19/2017), Personal history of other endocrine, nutritional and metabolic disease, Personal history of other endocrine, nutritional and metabolic disease, Personal history of other endocrine, nutritional and metabolic disease, Personal history of other medical treatment, Personal history of other specified conditions (05/14/2020), Personal history of other specified conditions (10/21/2021), Personal history of other specified conditions (06/19/2013), Personal history of urinary (tract) infections (01/13/2022), Proteinuria, unspecified, Spondylosis without myelopathy or radiculopathy, cervical region, Sprain of ribs, initial encounter (09/09/2021), Sprain of ribs, initial encounter (09/09/2021), Strain of muscle, fascia and tendon of lower back, subsequent encounter (09/10/2019), Type 2 diabetes mellitus with mild nonproliferative diabetic retinopathy without macular edema, bilateral (12/09/2021), and 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    Surgical History  He has a past surgical history that includes Tonsillectomy (05/29/2013); Gallbladder surgery (06/19/2013); Prostatectomy (06/19/2013); Cataract extraction (06/19/2013); Colonoscopy (02/06/2017); Knee surgery (02/06/2017); Esophagogastroduodenoscopy (02/06/2017); Cystoscopy (02/06/2017); Cholecystectomy (02/06/2017); and Knee surgery (02/06/2017).    "  Social History  He reports that he has never smoked. He has never been exposed to tobacco smoke. He has never used smokeless tobacco. He reports that he does not drink alcohol and does not use drugs.    Family History  Family History   Problem Relation Name Age of Onset    Pancreatic cancer Mother      Diabetes Mother      Heart disease Mother      Kidney disease Father      Hearing loss Brother      Other (elevated psa) Brother          serum    Other (cardiac disorder) Maternal Grandmother      Other (cardiac disorder) Maternal Grandfather      Other (cardiac disorder) Paternal Grandmother      Other (cardiac disorder) Paternal Grandfather         Medications  Current Outpatient Medications   Medication Instructions    acetaminophen (Tylenol) 325 mg tablet 1 tablet as needed    amLODIPine (NORVASC) 10 mg, oral, Every 24 hours    aspirin 81 mg chewable tablet 1 tablet, Daily    blood-glucose sensor (Dexcom G7 Sensor) device For continuous glucose monitoring.  Change every 10 days.    calcitriol (Rocaltrol) 0.5 mcg capsule 1 capsule, Every 24 hours    Dexcom G4 platinum  (Dexcom G7 ) misc For continuous glucose monitoring.    FreeStyle Lite Strips strip For glucose testing 3 times daily    gabapentin (NEURONTIN) 100 mg, oral, Nightly    insulin lispro (HUMALOG KWIKPEN INSULIN) 4-10 Units, subcutaneous, 3 times daily before meals, Take as directed per insulin instructions.    Lantus U-100 Insulin 62-65 Units, subcutaneous, Nightly    metoprolol succinate XL (TOPROL-XL) 50 mg, oral, Daily    omega 3-dha-epa-fish oil (Fish OiL) 1,200 (144-216) mg capsule Fish Oil 1200 MG Oral Capsule  Refills: 0  Start: 6-May-2021    omeprazole 20 mg tablet,disintegrat, delay rel Take by mouth.    pen needle, diabetic (Novofine 32) 32 gauge x 1/4\" needle 3-4 times daily    pravastatin (Pravachol) 20 mg tablet take 1 tablet by mouth ONCE every 24 hours    testosterone (ANDROGEL) 50 mg, transdermal, Daily    torsemide " (Demadex) 20 mg tablet 1 tablet, Daily       Allergies  Cortisone and Penicillins    Review of Systems   Constitutional:  Negative for appetite change and fever.   HENT:  Positive for dental problem and tinnitus. Negative for sore throat.         Denies dry mouth   Eyes:  Negative for visual disturbance.   Respiratory:  Negative for cough and shortness of breath.    Cardiovascular:  Negative for chest pain.   Gastrointestinal:  Negative for abdominal pain, constipation, diarrhea, nausea and vomiting.   Endocrine: Negative for polydipsia and polyuria.   Genitourinary:  Negative for frequency.   Musculoskeletal:  Positive for arthralgias and back pain.   Skin:  Negative for rash.   Neurological:  Negative for headaches.   Psychiatric/Behavioral:  The patient is not nervous/anxious.          Last Recorded Vitals  Blood pressure 143/69, pulse 57, weight 101 kg (223 lb).    Physical Exam  Constitutional:       General: He is not in acute distress.  HENT:      Head: Normocephalic.      Mouth/Throat:      Mouth: Mucous membranes are moist.   Eyes:      Extraocular Movements: Extraocular movements intact.   Neck:      Thyroid: No thyroid mass or thyromegaly.   Cardiovascular:      Pulses:           Radial pulses are 2+ on the right side and 2+ on the left side.   Lymphadenopathy:      Cervical: No cervical adenopathy.   Neurological:      Mental Status: He is alert.      Motor: No tremor.   Psychiatric:         Mood and Affect: Affect normal.          Relevant Results  Glucose (mg/dL)   Date Value   11/19/2024 79   05/14/2024 159 (H)   02/26/2024 140 (H)     POC HEMOGLOBIN A1c (%)   Date Value   12/04/2024 7.9 (A)   08/22/2024 8.0 (A)   01/25/2024 9.7 (A)     Bicarbonate (mmol/L)   Date Value   11/19/2024 23   05/14/2024 25   02/26/2024 23     Urea Nitrogen (mg/dL)   Date Value   11/19/2024 43 (H)   05/14/2024 48 (H)   02/26/2024 62 (H)     Creatinine (mg/dL)   Date Value   11/19/2024 3.36 (H)   05/14/2024 2.84 (H)    02/26/2024 2.90 (H)       IMPRESSION  TYPE 2 DIABETES MELLITUS  LONG TERM CURRENT INSULIN USE   Rapid A1c 7.2%  Overall glucose control improved  Patient was adhering to and benefitting from continuous glucose monitoring until denied by supplier.         RECOMMENDATIONS  Lantus 60 units at bedtime    Resume DexCom G7    Follow up 3 months      For colon surgery:  Decrease Lantus to 35 units the night before prep and the night before surgery  Hold Humalog the day of prep and morning of surgery

## 2025-03-19 NOTE — PATIENT INSTRUCTIONS
A1c 7.2%    RECOMMENDATIONS  Lantus 60 units at bedtime    Resume DexCom G7    Follow up 3 months      For colon surgery:  Decrease Lantus to 35 units the night before prep and the night before surgery  Hold Humalog the day of prep and morning of surgery

## 2025-03-19 NOTE — PROGRESS NOTES
General Surgery History and Physical    Referring Provider:  Robinson Ackerman DO      Chief Complaint:  Colon cancer    History of Present Illness:  This is a 79 y.o. male who presents with colon cancer.  He had undergone a colonoscopy in 2017 with polyps removed.  He underwent another colonoscopy recently with polyps removed from the ascending colon/hepatic flexure as well as the sigmoid colon.  Sigmoid colon came back as adenomas, but the ascending colon polyps have returned as adenocarcinoma.  He denies any symptoms at all.    Past Medical History:  Obesity  Chronic kidney disease  Diabetes  Venous stasis ulcers  Hypertension  Hyperlipidemia  Prostate cancer    Past Surgical History:  Cataract replacement  Cholecystectomy  Colonoscopy  Cystoscopy  Esophagogastroduodenoscopy  Left knee surgery  Right knee surgery  Prostatectomy  Tonsillectomy    Medications:  Amlodipine  Aspirin  Dexcom  Gabapentin  Insulin  Lantus  Omeprazole  Pravastatin  Testosterone  Torsemide    Allergies:  Cortisone  Penicillin    Family History:  Pancreatic cancer  Diabetes  Heart disease  Kidney disease    Social History:  Single.  Retired .  Denies tobacco, alcohol, and illicits.       Review of Systems:  A complete 12 point review of systems was performed and is negative except as noted in the history of present illness.      Vital Signs:  None      Physical Exam:  General: No acute distress. Sitting up in bed.   Neuro: Alert and oriented ×3. Follows commands.  Head: Atraumatic  Eyes: Pupils equal reactive to light. Extraocular motions intact.  Ears: Hears normal speaking voice.  Mouth, Nose, Throat: Mucous membranes moist.  Normal dentition.  Neck: Supple. No appreciable masses.  Chest: No crepitus.  No appreciable scars.  Heart: Regular rate and rhythm.  Lung: Clear to auscultation bilaterally.  Vascular: No carotid bruits.  Palpable radial pulses bilaterally.  Abdomen: Soft. Nondistended. Nontender.    Musculoskeletal: Moves  all extremities.  Normal range of motion.  Lymphatic: No palpable lymph nodes.  Skin: No rashes or lesions.  Psychological: Normal affect      Laboratory Values:  CBC:   Lab Results   Component Value Date    WBC 7.5 11/19/2024    RBC 4.59 11/19/2024    HGB 14.0 11/19/2024    HCT 46.3 11/19/2024     11/19/2024       RFP:   Lab Results   Component Value Date     11/19/2024    K 5.0 11/19/2024     11/19/2024    CO2 23 11/19/2024    BUN 43 (H) 11/19/2024    CREATININE 3.36 (H) 11/19/2024    CALCIUM 9.6 11/19/2024    PHOS 4.6 11/19/2024        LFTs:   Lab Results   Component Value Date    PROT 6.5 02/26/2024    PROT 6.3 (L) 02/26/2024    ALBUMIN 3.7 11/19/2024    BILITOT 0.5 02/26/2024    BILIDIR 0.1 11/28/2017    ALKPHOS 83 02/26/2024    AST 15 02/26/2024    ALT 16 02/26/2024      Pathology: Adenocarcinoma      Imaging:    None      Assessment:  This is a 79 y.o. male who presents with colon cancer seen and a polypectomy site from the ascending colon/hepatic flexure.  We discussed that potential reresection and follow-up with advanced endoscopy is possible, but that I would recommend a laparoscopic right hemicolectomy.      Plan:  --CT chest abdomen pelvis for colon cancer staging  --CEA  -- We will plan for a partial colectomy.  We will plan to start laparoscopically with the possibility of needing to open.  -- The patient will require preoperative labs: CBC, RFP, Magnesium, LFTs, INR, and Type and Screen.  -- The patient will require an EKG and Chest X-ray  -- The patient will require preadmission testing.      -- We will plan for the patient to perform a mechanical bowel preparation the night before surgery with Prepopik.  -- The patient will take Metronidazole and Neomycin orally at 3 separate times the night prior to surgery.  -- The patient will take over the counter protein supplement shakes daily prior to surgery.  -- The patient will shower with a chlorhexidine soap shower the night or  morning before surgery.      Carlos Dunn MD  General Surgery  Office: [phone]  Fax:     [phone]  2:32 PM   03/19/25

## 2025-03-20 DIAGNOSIS — C18.2 MALIGNANT NEOPLASM OF ASCENDING COLON (MULTI): Primary | ICD-10-CM

## 2025-03-20 RX ORDER — NEOMYCIN SULFATE 500 MG/1
TABLET ORAL
Qty: 6 TABLET | Refills: 0 | Status: SHIPPED | OUTPATIENT
Start: 2025-03-20

## 2025-03-20 RX ORDER — METRONIDAZOLE 500 MG/1
TABLET ORAL
Qty: 3 TABLET | Refills: 0 | Status: SHIPPED | OUTPATIENT
Start: 2025-03-20

## 2025-03-31 ENCOUNTER — TREATMENT (OUTPATIENT)
Dept: PHYSICAL THERAPY | Facility: CLINIC | Age: 80
End: 2025-03-31
Payer: MEDICARE

## 2025-03-31 DIAGNOSIS — M54.42 CHRONIC BILATERAL LOW BACK PAIN WITH LEFT-SIDED SCIATICA: ICD-10-CM

## 2025-03-31 DIAGNOSIS — R29.898 WEAKNESS OF BOTH LOWER EXTREMITIES: ICD-10-CM

## 2025-03-31 DIAGNOSIS — M25.552 HIP PAIN, LEFT: Primary | ICD-10-CM

## 2025-03-31 DIAGNOSIS — G89.29 CHRONIC BILATERAL LOW BACK PAIN WITH LEFT-SIDED SCIATICA: ICD-10-CM

## 2025-03-31 PROCEDURE — 97110 THERAPEUTIC EXERCISES: CPT | Mod: GP,CQ

## 2025-03-31 NOTE — PROGRESS NOTES
"Physical Therapy                                                                                  Physical Therapy Treatment       Patient name: Michael Szymanski  MRN:   [id number]  Today's Date: 3/31/25     Time Calculation  Start Time: 1106 (pt late)  Stop Time: 1134  Time Calculation (min): 28 min  1. Hip pain, left        2. Chronic bilateral low back pain with left-sided sciatica  Follow Up In Physical Therapy      3. Weakness of both lower extremities                      Assessment:  Note cane is bending metal is beginning to fold into pinch, this brought to pt.s attention , marginally acknowledged. Somewhat Hard effort exerted to complete exercises performed today. No complaint of increase in pain or symptoms  Plan:      To continue with PT treatment 2x/week for 4-6 weeks for Therapeutic exercise, Therapeutic Activities, Neuro Re-Education, Manual therapy, Modalities PRN, and Patient Education / instruction in HEP.     Subjective: Pt states that he should get credit for 10 minutes walk from parking lot to inside clinic . Pt denied increase pain . Pt states that he negotiates full flight of stair steps 3-4 times at least per day typically non reciprocating pattern /HR.  Response to last session: today 1st session since IE         Pain  5/10 hips R>L , low back /chronic   Objective:  Decrease pain, improve safety awareness, develop HEP, increase strength of core and LE  Treatment:  Therapeutic Exercise 24 minutes  Posture against the wall 30\" x 5 (performed before and between each exercise completed )  Standing hip abduction 2 sets of 10  Heel raise 2 sets of 10  MIP 20 reps   Wall bridge 2 sets of 10  Seated isoball squeeze between knees 10 reps 5\" holds   Pt required seated rest break between most exercise performed = 4 minutes    Education:   Recommended if using cane for assistive device to replace cane due to lower part  its metal is  bending   .    Bianka Morrison, PTA    Active       PT Problem       " Patient will report decrease in pain level  by >/= 80% in order to perform standing and walking, ADLs, IADLs,  and leisure/work/household activities with minimal to no restrictions.        Start:  03/05/25    Expected End:  05/28/25            Patient will demonstrate independence and compliance with safe and recommended  HEP in order to maximize and maintain functional gains made in physical therapy.        Start:  03/05/25    Expected End:  05/28/25            Patient will increase core and B hip, knees, and ankle strength by 1/3-2/3 muscle grade MMT  in order to improve standing and walking tolerance,and participate without restriction in ADL, IADL, leisure activities, household and/or work tasks.        Start:  03/05/25    Expected End:  05/28/25            Patient will improve standing balance as evident by a score of 3 / 6 or greater on Romberg in order to increase stability in gait, decrease risk of falls and improve safety with functional mobility and ADLs.        Start:  03/05/25    Expected End:  05/28/25            Patient with improved activity tolerance with standing and walking using appropriate assistive device, and improved gait pattern due to increased strength and balance.       Start:  03/05/25    Expected End:  05/28/25

## 2025-04-03 ENCOUNTER — OFFICE VISIT (OUTPATIENT)
Dept: PAIN MEDICINE | Facility: CLINIC | Age: 80
End: 2025-04-03
Payer: MEDICARE

## 2025-04-03 VITALS
BODY MASS INDEX: 35.84 KG/M2 | DIASTOLIC BLOOD PRESSURE: 64 MMHG | HEIGHT: 66 IN | OXYGEN SATURATION: 97 % | RESPIRATION RATE: 18 BRPM | SYSTOLIC BLOOD PRESSURE: 122 MMHG | WEIGHT: 223 LBS | HEART RATE: 55 BPM

## 2025-04-03 DIAGNOSIS — M48.062 SPINAL STENOSIS OF LUMBAR REGION WITH NEUROGENIC CLAUDICATION: Primary | ICD-10-CM

## 2025-04-03 DIAGNOSIS — M47.816 LUMBAR FACET ARTHROPATHY: ICD-10-CM

## 2025-04-03 DIAGNOSIS — C18.2 MALIGNANT NEOPLASM OF ASCENDING COLON (MULTI): Primary | ICD-10-CM

## 2025-04-03 PROCEDURE — 3078F DIAST BP <80 MM HG: CPT | Performed by: STUDENT IN AN ORGANIZED HEALTH CARE EDUCATION/TRAINING PROGRAM

## 2025-04-03 PROCEDURE — 1036F TOBACCO NON-USER: CPT | Performed by: STUDENT IN AN ORGANIZED HEALTH CARE EDUCATION/TRAINING PROGRAM

## 2025-04-03 PROCEDURE — 1159F MED LIST DOCD IN RCRD: CPT | Performed by: STUDENT IN AN ORGANIZED HEALTH CARE EDUCATION/TRAINING PROGRAM

## 2025-04-03 PROCEDURE — 99213 OFFICE O/P EST LOW 20 MIN: CPT | Performed by: STUDENT IN AN ORGANIZED HEALTH CARE EDUCATION/TRAINING PROGRAM

## 2025-04-03 PROCEDURE — 1157F ADVNC CARE PLAN IN RCRD: CPT | Performed by: STUDENT IN AN ORGANIZED HEALTH CARE EDUCATION/TRAINING PROGRAM

## 2025-04-03 PROCEDURE — 3074F SYST BP LT 130 MM HG: CPT | Performed by: STUDENT IN AN ORGANIZED HEALTH CARE EDUCATION/TRAINING PROGRAM

## 2025-04-03 PROCEDURE — 1125F AMNT PAIN NOTED PAIN PRSNT: CPT | Performed by: STUDENT IN AN ORGANIZED HEALTH CARE EDUCATION/TRAINING PROGRAM

## 2025-04-03 PROCEDURE — G2211 COMPLEX E/M VISIT ADD ON: HCPCS | Performed by: STUDENT IN AN ORGANIZED HEALTH CARE EDUCATION/TRAINING PROGRAM

## 2025-04-03 PROCEDURE — 1160F RVW MEDS BY RX/DR IN RCRD: CPT | Performed by: STUDENT IN AN ORGANIZED HEALTH CARE EDUCATION/TRAINING PROGRAM

## 2025-04-03 ASSESSMENT — ENCOUNTER SYMPTOMS
OCCASIONAL FEELINGS OF UNSTEADINESS: 1
LOSS OF SENSATION IN FEET: 0
DEPRESSION: 0

## 2025-04-03 ASSESSMENT — PATIENT HEALTH QUESTIONNAIRE - PHQ9
1. LITTLE INTEREST OR PLEASURE IN DOING THINGS: NOT AT ALL
SUM OF ALL RESPONSES TO PHQ9 QUESTIONS 1 AND 2: 0
2. FEELING DOWN, DEPRESSED OR HOPELESS: NOT AT ALL

## 2025-04-03 ASSESSMENT — PAIN - FUNCTIONAL ASSESSMENT: PAIN_FUNCTIONAL_ASSESSMENT: 0-10

## 2025-04-03 ASSESSMENT — PAIN SCALES - GENERAL
PAINLEVEL_OUTOF10: 4
PAINLEVEL_OUTOF10: 4

## 2025-04-03 NOTE — PROGRESS NOTES
[facility]  Follow Up Office Visit Note 4/3/2025    Patient Information: Michael Szymanski, MRN: [id number], : 1945   Primary Care/Referring Physician: Robinson Ackerman DO, [id number] [address] [address]     Chief Complaint: Low back and left hip pain    Interval History: Mr. Szmyanski presents today for follow up. At his last visit I started Gabapentin and discussed increasing Tylenol. He was also planning to start PT    Today he reports no improvement since last seen. He does not feel the Gabapentin is providing much relief. He has been to a few sessions of PT with plans to continue this moving forward.    Brief History of Pain: Mr. Michael Szymanski is a 79 y.o. male with a PMHx of HTN, HLD, T2DM (A1c 7.9%), CKD, Klinefelter's syndrome who presents for left hip and intermittent low back pain.    He reports intermittent issues with left hip pain since his knee replacement approximately 8 years ago. He describes primarily left posterolateral buttock pain. He notices pain when trying to get out of bed in the morning, along with prolonged sitting. He denies any worsening of pain with walking. He denies any pain radiating further down his legs. He denies any numbness or tingling in his legs. He walks with hunched over posture. He was reportedly evaluated at [facility] orthopedics with no plan for surgery, although the details of this are unclear.     Current Pain Medications: OTC Tylenol - occasional mild relief, Gabapentin 100 mg nightly  Previously Tried Pain Medications: States he had PO steroids as a teenager and it caused severe depression    Relevant Surgeries: Denies lumbar spine or hip surgery. 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  Patient's scoliosis remains relatively unchanged from last visit. She is 17 years of age, Risser 5, greater than 2 years post menarche. Scoliosis is unlikely to progress. For posture, I am recommending daily back and core strengthening exercises. Home exercise regimen recommended, exercises demonstrated and reviewed in office, and patient and mother provided with a handout demonstrating the exercises. Physical therapy prescription also provided..No activity limitations. Activity as tolerated. All questions answered, patient and mother understand and agree to plan of care. Follow up In 6 months with AP and lateral spine x-ray.\par \par I, Theresa Grijalva, have acted as a scribe and documented the above information for Dr. Germain\par \par The above documentation completed by the scribe is an accurate record of both my words and actions. "tablet 1 tablet, Daily    calcitriol (Rocaltrol) 0.5 mcg capsule 1 capsule, Every 24 hours    Dexcom G4 platinum  (Dexcom G7 ) misc For continuous glucose monitoring.    Dexcom G7 Sensor device For continuous glucose monitoring.  Change every 10 days.    FreeStyle Lite Strips strip For glucose testing 3 times daily    HumaLOG U-100 Insulin 6-8 Units, subcutaneous, 3 times daily after meals, Take as directed per insulin instructions.    Lantus U-100 Insulin 60 Units, subcutaneous, Nightly    metoprolol succinate XL (TOPROL-XL) 50 mg, oral, Daily    metroNIDAZOLE (Flagyl) 500 mg tablet 1 tablet at 2pm  1 tablet at 7 pm and 1 tablet at 10 pm night before surgery    neomycin (Mycifradin) 500 mg tablet Take two tablets (1000 mg) by mouth at 3:00pm, 4:00pm and at bed time on the day prior to surgery    omega 3-dha-epa-fish oil (Fish OiL) 1,200 (144-216) mg capsule Fish Oil 1200 MG Oral Capsule  Refills: 0  Start: 6-May-2021    pen needle, diabetic (Novofine 32) 32 gauge x 1/4\" needle 3-4 times daily    pravastatin (Pravachol) 20 mg tablet take 1 tablet by mouth ONCE every 24 hours    testosterone (ANDROGEL) 50 mg, transdermal, Daily    torsemide (Demadex) 20 mg tablet 1 tablet, Daily      Allergies:   Allergies   Allergen Reactions    Cortisone Unknown    Penicillins Rash and Unknown       Past Medical & Surgical History:  Past Medical History:   Diagnosis Date    Acute pansinusitis, unspecified 08/06/2019    Acute non-recurrent pansinusitis    Benign prostatic hyperplasia without lower urinary tract symptoms     BPH (benign prostatic hypertrophy)    Cellulitis of left lower limb 10/08/2021    Cellulitis of left lower extremity    Class 2 obesity with body mass index (BMI) of 37.0 to 37.9 in adult 02/27/2025    Encounter for immunization 03/21/2017    Encounter for administration of vaccine    Encounter for screening for colorectal cancer in high risk patient     Hyperlipidemia, unspecified 12/10/2015    " Hypertensive chronic kidney disease with stage 1 through stage 4 chronic kidney disease, or unspecified chronic kidney disease 12/09/2021    Benign hypertension with CKD (chronic kidney disease) stage III    Klinefelter syndrome, unspecified ([facility])     History of Klinefelter syndrome    Localized edema 09/29/2021    Edema of extremity    Low back pain, unspecified 04/25/2018    Low back pain with radiation    Morbid (severe) obesity due to excess calories (Multi) 05/14/2020    Obesity, Class III, BMI 40-49.9 (morbid obesity)    Morbid (severe) obesity due to excess calories (Multi) 12/11/2021    Morbid obesity with BMI of 40.0-44.9, adult    Obesity, unspecified 08/07/2019    Obesity, Class II, BMI 35-39.9    Other abnormal glucose     Hemoglobin A1c greater than 9.0%    Other conditions influencing health status 03/12/2022    Uncontrolled diabetes mellitus with diabetic nephropathy    Other conditions influencing health status 03/12/2022    Uncontrolled diabetes mellitus with diabetic nephropathy    Other specified abnormal findings of blood chemistry 02/13/2020    Low vitamin D level    Other specified symptoms and signs involving the circulatory and respiratory systems 12/31/2018    Prolonged capillary refill time    Other symptoms and signs involving the musculoskeletal system 04/25/2018    Weakness of lower extremity    Other tear of medial meniscus, current injury, unspecified knee, initial encounter     MMT (medial meniscus tear)    Personal history of diseases of the skin and subcutaneous tissue 08/07/2019    History of partial thickness sunburn    Personal history of other diseases of male genital organs     History of prostatitis    Personal history of other diseases of the circulatory system     History of hypertension    Personal history of other diseases of the musculoskeletal system and connective tissue     History of osteoarthritis    Personal history of other diseases of the musculoskeletal  system and connective tissue     History of bursitis    Personal history of other diseases of the nervous system and sense organs     History of cataract    Personal history of other diseases of the respiratory system 06/25/2018    History of acute bronchitis    Personal history of other diseases of the respiratory system     History of bronchitis    Personal history of other diseases of urinary system 04/19/2017    History of hematuria    Personal history of other endocrine, nutritional and metabolic disease     History of hyperlipidemia    Personal history of other endocrine, nutritional and metabolic disease     History of obesity    Personal history of other endocrine, nutritional and metabolic disease     History of diabetic retinopathy    Personal history of other medical treatment     History of stress test    Personal history of other specified conditions 05/14/2020    History of edema    Personal history of other specified conditions 10/21/2021    History of fatigue    Personal history of other specified conditions 06/19/2013    History of elevated prostate specific antigen (PSA)    Personal history of urinary (tract) infections 01/13/2022    History of urinary tract infection    Proteinuria, unspecified     Proteinuria    Spondylosis without myelopathy or radiculopathy, cervical region     Degenerative joint disease of cervical spine    Sprain of ribs, initial encounter 09/09/2021    Sprain of ribs    Sprain of ribs, initial encounter 09/09/2021    Sprain of costal cartilage, initial encounter    Strain of muscle, fascia and tendon of lower back, subsequent encounter 09/10/2019    Lumbosacral strain, subsequent encounter    Type 2 diabetes mellitus with mild nonproliferative diabetic retinopathy without macular edema, bilateral 12/09/2021    Type 2 diabetes mellitus with mild nonproliferative diabetic retinopathy without macular edema, bilateral    Varicose veins of unspecified lower extremity with ulcer  of unspecified site (CODE) 10/08/2021    Venous stasis ulcer with varicose veins of lower extremity      Past Surgical History:   Procedure Laterality Date    CATARACT EXTRACTION  06/19/2013    Cataract Surgery    CHOLECYSTECTOMY  02/06/2017    Cholecystectomy    COLONOSCOPY  02/06/2017    Colonoscopy    CYSTOSCOPY  02/06/2017    Diagnostic Cystoscopy    ESOPHAGOGASTRODUODENOSCOPY  02/06/2017    Esophagogastroduodenoscopy With Biopsy    GALLBLADDER SURGERY  06/19/2013    Gallbladder Surgery    KNEE SURGERY  02/06/2017    Knee Surgery Left    KNEE SURGERY  02/06/2017    Knee Surgery Right    PROSTATECTOMY  06/19/2013    Prostatectomy Perineal Radical    TONSILLECTOMY  05/29/2013    Tonsillectomy       Family History   Problem Relation Name Age of Onset    Pancreatic cancer Mother      Diabetes Mother      Heart disease Mother      Kidney disease Father      Hearing loss Brother      Other (elevated psa) Brother          serum    Other (cardiac disorder) Maternal Grandmother      Other (cardiac disorder) Maternal Grandfather      Other (cardiac disorder) Paternal Grandmother      Other (cardiac disorder) Paternal Grandfather       Social History     Socioeconomic History    Marital status: Single     Spouse name: Not on file    Number of children: Not on file    Years of education: Not on file    Highest education level: Not on file   Occupational History    Not on file   Tobacco Use    Smoking status: Never     Passive exposure: Never    Smokeless tobacco: Never   Vaping Use    Vaping status: Never Used   Substance and Sexual Activity    Alcohol use: Never    Drug use: Never    Sexual activity: Defer   Other Topics Concern    Not on file   Social History Narrative    Not on file     Social Drivers of Health     Financial Resource Strain: Not on file   Food Insecurity: Not on file   Transportation Needs: Not on file   Physical Activity: Not on file   Stress: Not on file   Social Connections: Not on file   Intimate  "Partner Violence: Not on file   Housing Stability: Not on file       Problems, Past medical history, past surgical history, Medications, allergies, social and family history reviewed and as per the electronic medical record from today's encounter    Review of Systems:  CONST: No fever, chills, fatigue, weight changes  EYES: No loss of vision  ENT: No hearing loss, tinnitus  CV: No chest pain, palpitations  RESP: No dyspnea, shortness of breath, cough  GI: No stool incontinence, nausea, vomiting  : No urinary incontinence  MSK: No joint swelling  SKIN: No rash, no hives  NEURO: No headache, dizziness  PSYCH: No anxiety, depression  HEM/LYMPH: No easy bruising or bleeding  All other systems reviewed are negative     Physical Exam:  Vitals: /64   Pulse 55   Resp 18   Ht 1.676 m (5' 6\")   Wt 101 kg (223 lb)   SpO2 97%   BMI 35.99 kg/m²   General: No apparent distress. Alert, appropriate, oriented x 3. Mood and affect normal. Speaking in full sentences.  HENT: Normocephalic, atraumatic. Hearing intact.  Eyes: Extraocular movements grossly intact. Pupils equal and round.   Neck: Supple, trachea midline.  Lungs: Symmetric respiratory excursion on visual exam, nonlabored breathing.  Extremities: No clubbing, cyanosis, or edema noted in arms or legs.  Skin: No rashes, lesions, alopecia noted on back or extremities.   Neuro: Alert and appropriate. Bulk and tone within normal limits.    Laboratory Data:  The following laboratory data were reviewed during this visit:   Lab Results   Component Value Date    WBC 7.5 11/19/2024    RBC 4.59 11/19/2024    HGB 14.0 11/19/2024    HCT 46.3 11/19/2024     11/19/2024      Lab Results   Component Value Date    INR 1.0 09/23/2021     Lab Results   Component Value Date    CREATININE 3.36 (H) 11/19/2024    HGBA1C 7.2 (A) 03/19/2025       Imaging:  The following imaging impressions were reviewed by me during this visit:    -1/3/23 hip/pelvis xray shows mild left hip OA  - " "9/4/19 CT lumbar spine shows fairly severe multi-level DDD, facet arthropathy, as well as central canal stenosis       I also personally reviewed the images from the above studies myself. These images and my interpretation of them contributed to the management and decision making of the patient's medical plan.    ASSESSMENT:  Mr. Michael Szymanski is a 79 y.o. male with low back and left hip pain that is consistent with:    1. Spinal stenosis of lumbar region with neurogenic claudication    2. Lumbar facet arthropathy          PLAN:    Diagnostics:   - I did review his lumbar spine CT from 2019 which shows fairly severe multi-level degenerative changes, including likely severe canal stenosis at multiple levels. No further diagnostics are indicated at this time but would consider a lumbar spine MRI if he decides to pursue interventional treatment.    Physical Therapy and Rehabilitation:     - Continue current PT    Psychologically:  - No needs at this time    Medications  - No benefit from Gabapentin 100 mg nightly. I did discuss increasing to 100 mg TID but he was not interested.  - Recommend increasing to Tylenol 650 mg TID PRN    Duration  - Multiple years    Interventions:  - I suspect his \"hip pain\" is most likely due to lumbar spinal stenosis or radiculopathy.  He has no pain with hip joint provocative maneuvers and hip xrays show only mild OA. I would consider lumbar MOMO at L5/S1 in the future. He is hesitant to do this, citing that steroids caused severe depression when he was a teenager and because he is diabetic        Sincerely,  Anthony Shukla MD  [facility] [place]  "

## 2025-04-04 ENCOUNTER — TELEPHONE (OUTPATIENT)
Dept: PHYSICAL THERAPY | Facility: CLINIC | Age: 80
End: 2025-04-04
Payer: MEDICARE

## 2025-04-04 ENCOUNTER — DOCUMENTATION (OUTPATIENT)
Dept: PHYSICAL THERAPY | Facility: CLINIC | Age: 80
End: 2025-04-04
Payer: MEDICARE

## 2025-04-08 ENCOUNTER — TREATMENT (OUTPATIENT)
Dept: PHYSICAL THERAPY | Facility: CLINIC | Age: 80
End: 2025-04-08
Payer: MEDICARE

## 2025-04-08 DIAGNOSIS — R29.898 WEAKNESS OF BOTH LOWER EXTREMITIES: ICD-10-CM

## 2025-04-08 DIAGNOSIS — G89.29 CHRONIC BILATERAL LOW BACK PAIN WITH LEFT-SIDED SCIATICA: ICD-10-CM

## 2025-04-08 DIAGNOSIS — M54.42 CHRONIC BILATERAL LOW BACK PAIN WITH LEFT-SIDED SCIATICA: ICD-10-CM

## 2025-04-08 DIAGNOSIS — M25.552 HIP PAIN, LEFT: Primary | Chronic | ICD-10-CM

## 2025-04-08 PROCEDURE — 97110 THERAPEUTIC EXERCISES: CPT | Mod: GP,CQ

## 2025-04-09 LAB
BUN SERPL-MCNC: 55 MG/DL (ref 7–25)
CEA SERPL-MCNC: 3.1 NG/ML
CREAT SERPL-MCNC: 2.84 MG/DL (ref 0.7–1.28)
EGFRCR SERPLBLD CKD-EPI 2021: 22 ML/MIN/1.73M2

## 2025-04-10 ENCOUNTER — HOSPITAL ENCOUNTER (OUTPATIENT)
Dept: RADIOLOGY | Facility: HOSPITAL | Age: 80
Discharge: HOME | End: 2025-04-10
Payer: MEDICARE

## 2025-04-10 DIAGNOSIS — C18.2 MALIGNANT NEOPLASM OF ASCENDING COLON (MULTI): ICD-10-CM

## 2025-04-10 PROCEDURE — 2550000001 HC RX 255 CONTRASTS: Performed by: SURGERY

## 2025-04-10 PROCEDURE — A9698 NON-RAD CONTRAST MATERIALNOC: HCPCS | Performed by: SURGERY

## 2025-04-10 PROCEDURE — 71250 CT THORAX DX C-: CPT

## 2025-04-10 RX ADMIN — IOHEXOL 500 ML: 12 SOLUTION ORAL at 16:36

## 2025-04-15 ENCOUNTER — APPOINTMENT (OUTPATIENT)
Dept: PHYSICAL THERAPY | Facility: CLINIC | Age: 80
End: 2025-04-15
Payer: MEDICARE

## 2025-04-17 ENCOUNTER — HOSPITAL ENCOUNTER (OUTPATIENT)
Dept: RADIOLOGY | Facility: HOSPITAL | Age: 80
Discharge: HOME | End: 2025-04-17
Payer: MEDICARE

## 2025-04-17 ENCOUNTER — PRE-ADMISSION TESTING (OUTPATIENT)
Dept: PREADMISSION TESTING | Facility: HOSPITAL | Age: 80
End: 2025-04-17
Payer: MEDICARE

## 2025-04-17 VITALS
DIASTOLIC BLOOD PRESSURE: 80 MMHG | BODY MASS INDEX: 36.85 KG/M2 | TEMPERATURE: 97.3 F | WEIGHT: 229.28 LBS | HEART RATE: 52 BPM | OXYGEN SATURATION: 96 % | SYSTOLIC BLOOD PRESSURE: 135 MMHG | RESPIRATION RATE: 16 BRPM | HEIGHT: 66 IN

## 2025-04-17 DIAGNOSIS — Z01.818 PREOPERATIVE EXAMINATION: Primary | ICD-10-CM

## 2025-04-17 DIAGNOSIS — C18.2 MALIGNANT NEOPLASM OF ASCENDING COLON (MULTI): ICD-10-CM

## 2025-04-17 DIAGNOSIS — Z79.01 CURRENT USE OF LONG TERM ANTICOAGULATION: ICD-10-CM

## 2025-04-17 DIAGNOSIS — Z01.818 PREOPERATIVE EXAMINATION: ICD-10-CM

## 2025-04-17 LAB
ABO GROUP (TYPE) IN BLOOD: NORMAL
ALBUMIN SERPL BCP-MCNC: 3.8 G/DL (ref 3.4–5)
ALP SERPL-CCNC: 84 U/L (ref 33–136)
ALT SERPL W P-5'-P-CCNC: 13 U/L (ref 10–52)
ANION GAP SERPL CALC-SCNC: 12 MMOL/L (ref 10–20)
ANTIBODY SCREEN: NORMAL
APTT PPP: 29 SECONDS (ref 26–36)
AST SERPL W P-5'-P-CCNC: 13 U/L (ref 9–39)
ATRIAL RATE: 55 BPM
BASOPHILS # BLD AUTO: 0.04 X10*3/UL (ref 0–0.1)
BASOPHILS NFR BLD AUTO: 0.7 %
BILIRUB SERPL-MCNC: 0.5 MG/DL (ref 0–1.2)
BUN SERPL-MCNC: 43 MG/DL (ref 6–23)
CALCIUM SERPL-MCNC: 8.6 MG/DL (ref 8.6–10.3)
CHLORIDE SERPL-SCNC: 105 MMOL/L (ref 98–107)
CO2 SERPL-SCNC: 26 MMOL/L (ref 21–32)
CREAT SERPL-MCNC: 3.12 MG/DL (ref 0.5–1.3)
EGFRCR SERPLBLD CKD-EPI 2021: 20 ML/MIN/1.73M*2
EOSINOPHIL # BLD AUTO: 0.51 X10*3/UL (ref 0–0.4)
EOSINOPHIL NFR BLD AUTO: 8.4 %
ERYTHROCYTE [DISTWIDTH] IN BLOOD BY AUTOMATED COUNT: 13.4 % (ref 11.5–14.5)
GLUCOSE SERPL-MCNC: 76 MG/DL (ref 74–99)
HCT VFR BLD AUTO: 39.5 % (ref 41–52)
HGB BLD-MCNC: 12.8 G/DL (ref 13.5–17.5)
IMM GRANULOCYTES # BLD AUTO: 0.02 X10*3/UL (ref 0–0.5)
IMM GRANULOCYTES NFR BLD AUTO: 0.3 % (ref 0–0.9)
INR PPP: 0.9 (ref 0.9–1.1)
LYMPHOCYTES # BLD AUTO: 1.62 X10*3/UL (ref 0.8–3)
LYMPHOCYTES NFR BLD AUTO: 26.6 %
MAGNESIUM SERPL-MCNC: 1.99 MG/DL (ref 1.6–2.4)
MCH RBC QN AUTO: 30.6 PG (ref 26–34)
MCHC RBC AUTO-ENTMCNC: 32.4 G/DL (ref 32–36)
MCV RBC AUTO: 95 FL (ref 80–100)
MONOCYTES # BLD AUTO: 0.6 X10*3/UL (ref 0.05–0.8)
MONOCYTES NFR BLD AUTO: 9.9 %
NEUTROPHILS # BLD AUTO: 3.29 X10*3/UL (ref 1.6–5.5)
NEUTROPHILS NFR BLD AUTO: 54.1 %
NRBC BLD-RTO: 0 /100 WBCS (ref 0–0)
P AXIS: 33 DEGREES
P OFFSET: 170 MS
P ONSET: 109 MS
PLATELET # BLD AUTO: 209 X10*3/UL (ref 150–450)
POTASSIUM SERPL-SCNC: 5 MMOL/L (ref 3.5–5.3)
PR INTERVAL: 208 MS
PROT SERPL-MCNC: 6.9 G/DL (ref 6.4–8.2)
PROTHROMBIN TIME: 10.2 SECONDS (ref 9.8–12.4)
Q ONSET: 213 MS
QRS COUNT: 9 BEATS
QRS DURATION: 100 MS
QT INTERVAL: 398 MS
QTC CALCULATION(BAZETT): 380 MS
QTC FREDERICIA: 386 MS
R AXIS: -49 DEGREES
RBC # BLD AUTO: 4.18 X10*6/UL (ref 4.5–5.9)
RH FACTOR (ANTIGEN D): NORMAL
SODIUM SERPL-SCNC: 138 MMOL/L (ref 136–145)
T AXIS: 48 DEGREES
T OFFSET: 412 MS
VENTRICULAR RATE: 55 BPM
WBC # BLD AUTO: 6.1 X10*3/UL (ref 4.4–11.3)

## 2025-04-17 PROCEDURE — 84075 ASSAY ALKALINE PHOSPHATASE: CPT

## 2025-04-17 PROCEDURE — 71045 X-RAY EXAM CHEST 1 VIEW: CPT

## 2025-04-17 PROCEDURE — 93005 ELECTROCARDIOGRAM TRACING: CPT

## 2025-04-17 PROCEDURE — 36415 COLL VENOUS BLD VENIPUNCTURE: CPT

## 2025-04-17 PROCEDURE — 86901 BLOOD TYPING SEROLOGIC RH(D): CPT

## 2025-04-17 PROCEDURE — 99204 OFFICE O/P NEW MOD 45 MIN: CPT | Performed by: NURSE PRACTITIONER

## 2025-04-17 PROCEDURE — 83735 ASSAY OF MAGNESIUM: CPT

## 2025-04-17 PROCEDURE — 85610 PROTHROMBIN TIME: CPT

## 2025-04-17 PROCEDURE — 85025 COMPLETE CBC W/AUTO DIFF WBC: CPT

## 2025-04-17 PROCEDURE — 87081 CULTURE SCREEN ONLY: CPT | Mod: GEALAB

## 2025-04-17 RX ORDER — CHLORHEXIDINE GLUCONATE ORAL RINSE 1.2 MG/ML
15 SOLUTION DENTAL DAILY
Qty: 30 ML | Refills: 0 | Status: SHIPPED | OUTPATIENT
Start: 2025-04-17 | End: 2025-04-19

## 2025-04-17 RX ORDER — CHLORHEXIDINE GLUCONATE 40 MG/ML
1 SOLUTION TOPICAL DAILY
Start: 2025-04-17 | End: 2025-04-22

## 2025-04-17 ASSESSMENT — DUKE ACTIVITY SCORE INDEX (DASI)
CAN YOU DO HEAVY WORK AROUND THE HOUSE LIKE SCRUBBING FLOORS OR LIFTING AND MOVING HEAVY FURNITURE: NO
CAN YOU PARTICIPATE IN STRENOUS SPORTS LIKE SWIMMING, SINGLES TENNIS, FOOTBALL, BASKETBALL, OR SKIING: NO
CAN YOU WALK A BLOCK OR TWO ON LEVEL GROUND: NO
CAN YOU DO MODERATE WORK AROUND THE HOUSE LIKE VACUUMING, SWEEPING FLOORS OR CARRYING GROCERIES: NO
DASI METS SCORE: 3.6
TOTAL_SCORE: 7.2
CAN YOU RUN A SHORT DISTANCE: NO
CAN YOU HAVE SEXUAL RELATIONS: NO
CAN YOU DO LIGHT WORK AROUND THE HOUSE LIKE DUSTING OR WASHING DISHES: YES
CAN YOU PARTICIPATE IN MODERATE RECREATIONAL ACTIVITIES LIKE GOLF, BOWLING, DANCING, DOUBLES TENNIS OR THROWING A BASEBALL OR FOOTBALL: NO
CAN YOU TAKE CARE OF YOURSELF (EAT, DRESS, BATHE, OR USE TOILET): YES
CAN YOU DO YARD WORK LIKE RAKING LEAVES, WEEDING OR PUSHING A MOWER: NO
CAN YOU WALK INDOORS, SUCH AS AROUND YOUR HOUSE: YES
CAN YOU CLIMB A FLIGHT OF STAIRS OR WALK UP A HILL: NO

## 2025-04-17 ASSESSMENT — ENCOUNTER SYMPTOMS
CARDIOVASCULAR NEGATIVE: 1
NEUROLOGICAL NEGATIVE: 1
DYSPNEA WITH EXERTION: 1
RESPIRATORY NEGATIVE: 1
NECK NEGATIVE: 1
EYES NEGATIVE: 1
GASTROINTESTINAL NEGATIVE: 1
CONSTITUTIONAL NEGATIVE: 1

## 2025-04-17 ASSESSMENT — PAIN - FUNCTIONAL ASSESSMENT: PAIN_FUNCTIONAL_ASSESSMENT: 0-10

## 2025-04-17 ASSESSMENT — PAIN SCALES - GENERAL: PAINLEVEL_OUTOF10: 5 - MODERATE PAIN

## 2025-04-17 ASSESSMENT — LIFESTYLE VARIABLES: SMOKING_STATUS: NONSMOKER

## 2025-04-17 NOTE — CPM/PAT H&P
CPM/PAT Evaluation       Name: Michael Szymanski (Michael ORTIZ Nessa)  /Age: 1945/79 y.o.     Visit Type:   In-Person       Chief Complaint: Ascending colon cancer    HPI 80 y/o male scheduled for colonoscopy and Laparoscopic right hemicolectomy on 2025 with  Dr. Dunn secondary to Ascending colon cancer.  PMHX includes HTN, T2DM, CKD Stage 4.  PAT is consulted today for perioperative risk stratification and optimization.      Medical History[1]    Surgical History[2]    Patient Sexual activity questions deferred to the physician.    Family History[3]    Allergies[4]    Prior to Admission medications    Medication Sig Start Date End Date Taking? Authorizing Provider   acetaminophen (Tylenol) 325 mg tablet 1 tablet as needed    Historical Provider, MD   amLODIPine (Norvasc) 10 mg tablet Take 1 tablet (10 mg) by mouth once every 24 hours. 8/10/23   Robinson Ackerman DO   aspirin 81 mg chewable tablet Chew 1 tablet (81 mg) once daily.    Historical Provider, MD   calcitriol (Rocaltrol) 0.5 mcg capsule 1 capsule (0.5 mcg) once every 24 hours.    Historical Provider, MD   Dexcom G4 platinum  (Dexcom G7 ) misc For continuous glucose monitoring. 24   Tye Brizuela MD   Dexcom G7 Sensor device For continuous glucose monitoring.  Change every 10 days. 3/19/25   Tye Brizuela MD   FreeStyle Lite Strips strip For glucose testing 3 times daily 10/24/23   Tye Brizuela MD   HumaLOG U-100 Insulin 100 unit/mL injection Inject 6-8 Units under the skin 3 times a day after meals. Take as directed per insulin instructions. 3/19/25   Tye Brizuela MD   Lantus U-100 Insulin 100 unit/mL injection Inject 60 Units under the skin once daily at bedtime. 3/19/25 3/19/26  Tye Brizuela MD   metoprolol succinate XL (Toprol-XL) 50 mg 24 hr tablet TAKE 1 TABLET BY MOUTH ONCE DAILY 24   Robinson Ackerman DO   metroNIDAZOLE (Flagyl) 500 mg tablet 1 tablet at 2pm  1 tablet at 7 pm and 1 tablet at 10 pm  "night before surgery 3/20/25   Sunny Dunn MD   neomycin (Mycifradin) 500 mg tablet Take two tablets (1000 mg) by mouth at 3:00pm, 4:00pm and at bed time on the day prior to surgery 3/20/25   Sunny Dunn MD   omega 3-dha-epa-fish oil (Fish OiL) 1,200 (144-216) mg capsule Fish Oil 1200 MG Oral Capsule  Refills: 0  Start: 6-May-2021 5/6/21   Historical Provider, MD   pen needle, diabetic (Novofine 32) 32 gauge x 1/4\" needle 3-4 times daily 5/1/24   Tye Brizuela MD   pravastatin (Pravachol) 20 mg tablet take 1 tablet by mouth ONCE every 24 hours 6/6/24   Lionel Alfaro DO   testosterone 50 mg/5 gram (1 %) gel Place 1 packet (50 mg) on the skin once daily. 9/4/24 4/3/25  Jorge Vidal MD MPH   torsemide (Demadex) 20 mg tablet Take 1 tablet (20 mg) by mouth once daily.    Historical Provider, MD MADDOX ROS:   Constitutional:   neg    Neuro/Psych:   neg    Eyes:   neg     use of corrective lenses  Ears:   Nose:   Mouth:   Throat:   Neck:   neg    Cardio:   neg     DRISCOLL  Respiratory:   neg    Endocrine:   GI:   neg    :   neg    Musculoskeletal:    Cane for ambulation  Wheelchair for distance  Hematologic:   neg    Skin:      Physical Exam  Vitals reviewed.   Constitutional:       Appearance: Normal appearance. He is obese.   HENT:      Mouth/Throat:      Mouth: Mucous membranes are moist.      Pharynx: Oropharynx is clear.   Eyes:      Pupils: Pupils are equal, round, and reactive to light.   Cardiovascular:      Rate and Rhythm: Normal rate and regular rhythm.      Pulses: Normal pulses.      Heart sounds: Normal heart sounds.   Pulmonary:      Effort: Pulmonary effort is normal.      Breath sounds: Normal breath sounds.   Abdominal:      General: Bowel sounds are normal.      Palpations: Abdomen is soft.   Musculoskeletal:         General: Normal range of motion.      Cervical back: Normal range of motion and neck supple.   Skin:     General: Skin is warm and dry.   Neurological:      " "General: No focal deficit present.      Mental Status: He is alert and oriented to person, place, and time.   Psychiatric:         Mood and Affect: Mood normal.         Behavior: Behavior normal.          Airway    Testing/Diagnostic:     Patient Specialist/PCP:     Visit Vitals  /80   Pulse 52   Temp 36.3 °C (97.3 °F) (Tympanic)   Resp 16   Ht 1.676 m (5' 6\")   Wt 104 kg (229 lb 4.5 oz)   SpO2 96%   BMI 37.01 kg/m²   Smoking Status Never   BSA 2.2 m²       DASI Risk Score      Flowsheet Row Pre-Admission Testing from 4/17/2025 in [facility]   Can you take care of yourself (eat, dress, bathe, or use toilet)?  2.75 filed at 04/17/2025 1306   Can you walk indoors, such as around your house? 1.75 filed at 04/17/2025 1306   Can you walk a block or two on level ground?  0 filed at 04/17/2025 1306   Can you climb a flight of stairs or walk up a hill? 0 filed at 04/17/2025 1306   Can you run a short distance? 0 filed at 04/17/2025 1306   Can you do light work around the house like dusting or washing dishes? 2.7 filed at 04/17/2025 1306   Can you do moderate work around the house like vacuuming, sweeping floors or carrying groceries? 0 filed at 04/17/2025 1306   Can you do heavy work around the house like scrubbing floors or lifting and moving heavy furniture?  0 filed at 04/17/2025 1306   Can you do yard work like raking leaves, weeding or pushing a mower? 0 filed at 04/17/2025 1306   Can you have sexual relations? 0 filed at 04/17/2025 1306   Can you participate in moderate recreational activities like golf, bowling, dancing, doubles tennis or throwing a baseball or football? 0 filed at 04/17/2025 1306   Can you participate in strenous sports like swimming, singles tennis, football, basketball, or skiing? 0 filed at 04/17/2025 1306   DASI SCORE 7.2 filed at 04/17/2025 1306   METS Score (Will be calculated only when all the questions are answered) 3.6 filed at 04/17/2025 1306          Caprini DVT " Assessment      Flowsheet Row Pre-Admission Testing from 4/17/2025 in [facility]   DVT Score (IF A SCORE IS NOT CALCULATING, MUST SELECT A BMI TO COMPLETE) 9 filed at 04/17/2025 1337   Surgical Factors Major surgery planned, lasting 2-3 hours filed at 04/17/2025 1337   BMI (BMI MUST BE CHOSEN) 31-40 (Obesity) filed at 04/17/2025 1337          Modified Frailty Index    No data to display       [id number] Stroke Risk Points  Current as of just now        N/A 0 to 9 Points:      Last Change: N/A          The [id number] risk score (Ifrah RUVALCABA, et al. 2009. © 2010 [employer]) quantifies the risk of stroke for a patient with atrial fibrillation. For patients without atrial fibrillation or under the age of 18 this score appears as N/A. Higher score values generally indicate higher risk of stroke.        This score is not applicable to this patient. Components are not calculated.          Revised Cardiac Risk Index      Flowsheet Row Pre-Admission Testing from 4/17/2025 in [facility]   High-Risk Surgery (Intraperitoneal, Intrathoracic,Suprainguinal vascular) 0 filed at 04/17/2025 1357   History of ischemic heart disease (History of MI, History of positive exercuse test, Current chest paint considered due to myocardial ischemia, Use of nitrate therapy, ECG with pathological Q Waves) 0 filed at 04/17/2025 1357   History of congestive heart failure (pulmonary edemia, bilateral rales or S3 gallop, Paroxysmal nocturnal dyspnea, CXR showing pulmonary vascular redistribution) 0 filed at 04/17/2025 1357   History of cerebrovascular disease (Prior TIA or stroke) 0 filed at 04/17/2025 1357   Pre-operative insulin treatment 0 filed at 04/17/2025 1357   Pre-operative creatinine>2 mg/dl 0 filed at 04/17/2025 1357   Revised Cardiac Risk Calculator 0 filed at 04/17/2025 1357          Apfel Simplified Score      Flowsheet Row Pre-Admission Testing from 4/17/2025 in [facility]   Smoking status 1 filed at 04/17/2025 1400   History of motion sickness or PONV  0 filed at 04/17/2025 1400   Use of postoperative opioids 1 filed at 04/17/2025 1400   Gender - Female 0=No filed at 04/17/2025 1400   Apfel Simplified Score Calculator 2 filed at 04/17/2025 1400          Risk Analysis Index Results This Encounter    No data found in the last 10 encounters.       Stop Bang Score      Flowsheet Row Pre-Admission Testing from 4/17/2025 in [facility]   Do you snore loudly? 0 filed at 04/17/2025 1305   Do you often feel tired or fatigued after your sleep? 1 filed at 04/17/2025 1305   Has anyone ever observed you stop breathing in your sleep? 0 filed at 04/17/2025 1305   Do you have or are you being treated for high blood pressure? 1 filed at 04/17/2025 1305   Recent BMI (Calculated) 37 filed at 04/17/2025 1305   Is BMI greater than 35 kg/m2? 1=Yes filed at 04/17/2025 1305   Age older than 50 years old? 1=Yes filed at 04/17/2025 1305   Is your neck circumference greater than 17 inches (Male) or 16 inches (Female)? 0 filed at 04/17/2025 1305   Gender - Male 1=Yes filed at 04/17/2025 1305   STOP-BANG Total Score 5 filed at 04/17/2025 1305          Prodigy: High Risk  Total Score: 20              Prodigy Age Score      Prodigy Gender Score          ARISCAT Score for Postoperative Pulmonary Complications      Flowsheet Row Pre-Admission Testing from 4/17/2025 in [facility]   Age Calculated Score 3 filed at 04/17/2025 1400   Preoperative SpO2 0 filed at 04/17/2025 1400   Respiratory infection in the last month Either upper or lower (i.e., URI, bronchitis, pneumonia), with fever and antibiotic treatment 0 filed at 04/17/2025 1400   Preoperative anemia (Hgb less than 10 g/dl) 0 filed at 04/17/2025 1400   Surgical incision  15 filed at 04/17/2025 1400   Duration of surgery  16 filed at 04/17/2025 1400   Emergency Procedure  0 filed at 04/17/2025 1400   ARISCAT Total Score  34  filed at 04/17/2025 1400          Eliu Perioperative Risk for Myocardial Infarction or Cardiac Arrest (MATHEW)      Flowsheet Row Pre-Admission Testing from 4/17/2025 in [facility]   Calculated Age Score 1.54 filed at 04/17/2025 1400   Functional Status  0.65 filed at 04/17/2025 1400   ASA Class  -1.92 filed at 04/17/2025 1400   Creatinine 0 filed at 04/17/2025 1400   Type of Procedure  1.13 filed at 04/17/2025 1400   MATHEW Total Score  -3.85 filed at 04/17/2025 1400   MATHEW % 2.08 filed at 04/17/2025 1400                Assessment and Plan:     HPI 80 y/o male scheduled for colonoscopy and Laparoscopic right hemicolectomy on 5/1/2025 with  Dr. Dunn secondary to Ascending colon cancer.  PMHX includes HTN, T2DM, CKD Stage 4,  prostate cancer.  PAT is consulted today for perioperative risk stratification and optimization.      Neuro:  No neurologic diagnosis, however, the patient is at increased risk for perioperative delirium secondary to  age, decreased functional status, polypharmacy  Patient is at increased risk for perioperative CVA secondary to  HTN, DM, increased age    HEENT:  No HEENT diagnosis or significant findings on chart review or clinical presentation and evaluation. No further preoperative testing/intervention indicated at this time.    Cardiovascular:  HTN - Managed by Renal   Continue Amlodipine and Metoprolol  Holding ASA 1 week prior to procedure   METS: 3.6  RCRI: 0 points, 3.9%  risk for postoperative MACE       Pulmonary:  No pulmonary diagnosis, however patient is at increased risk of perioperative complications secondary to age > 60, obesity, functional dependence  Stop Bang score is 5 placing patient at high risk for KATEY  PRODIGY: High risk for opioid induced respiratory depression  Pumonary education discussed, patient also provided deep breathing exerciseswith educational handout    Renal:   Follows with Dr. Gonzalez and was seen on 4/10/2025 for CKD4  Per Note: Per Note - No  Objection to proceding with surgery  Stable for the past 6 months    Endocrine:  Seen by Dr. Brizuela on 3/19/2025 for T2DM  Decrease Lantus to 35 units night before surgery   Hold Humalog the of prep and morning of surgery    Hematologic:  No hematologic diagnosis, however patient is at an increased risk for DVT  Caprini Score 9, patient at High risk for perioperative DVT.  Patient provided with VTE education/handout.    Gastrointestinal:   Seen by General Surgery, Dr Dunn on 3/19/2025 for malignant neoplasm of ascending colon  Discussed bowel prep the night before  Plan for colonoscopy and Laparoscopic right hemicolectomy   Apfel 2    Infectious disease:   No infectious diagnosis or significant findings on chart review or clinical presentation and evaluation.   Prescription provided for CHG body wash and dental rinse. CHG use instructions reviewed and provided to patient.  Staph screen collected    Musculoskeletal:   No diagnosis or significant findings on chart review or clinical presentation and evaluation.     Anesthesia/Airway:  No anesthesia complications      Medication instructions and NPO guidelines reviewed with the patient.  All questions or concerns discussed and addressed.      Labs, Chest Xray and EKG ordered                  [1]   Past Medical History:  Diagnosis Date    Acute pansinusitis, unspecified 08/06/2019    Acute non-recurrent pansinusitis    Benign prostatic hyperplasia without lower urinary tract symptoms     BPH (benign prostatic hypertrophy)    Cellulitis of left lower limb 10/08/2021    Cellulitis of left lower extremity    Class 2 obesity with body mass index (BMI) of 37.0 to 37.9 in adult 02/27/2025    Encounter for immunization 03/21/2017    Encounter for administration of vaccine    Encounter for screening for colorectal cancer in high risk patient     Hyperlipidemia, unspecified 12/10/2015    Hypertensive chronic kidney disease with stage 1 through stage 4 chronic kidney disease, or  unspecified chronic kidney disease 12/09/2021    Benign hypertension with CKD (chronic kidney disease) stage III    Klinefelter syndrome, unspecified ([facility])     History of Klinefelter syndrome    Localized edema 09/29/2021    Edema of extremity    Low back pain, unspecified 04/25/2018    Low back pain with radiation    Morbid (severe) obesity due to excess calories (Multi) 05/14/2020    Obesity, Class III, BMI 40-49.9 (morbid obesity)    Morbid (severe) obesity due to excess calories (Multi) 12/11/2021    Morbid obesity with BMI of 40.0-44.9, adult    Obesity, unspecified 08/07/2019    Obesity, Class II, BMI 35-39.9    Other abnormal glucose     Hemoglobin A1c greater than 9.0%    Other conditions influencing health status 03/12/2022    Uncontrolled diabetes mellitus with diabetic nephropathy    Other conditions influencing health status 03/12/2022    Uncontrolled diabetes mellitus with diabetic nephropathy    Other specified abnormal findings of blood chemistry 02/13/2020    Low vitamin D level    Other specified symptoms and signs involving the circulatory and respiratory systems 12/31/2018    Prolonged capillary refill time    Other symptoms and signs involving the musculoskeletal system 04/25/2018    Weakness of lower extremity    Other tear of medial meniscus, current injury, unspecified knee, initial encounter     MMT (medial meniscus tear)    Personal history of diseases of the skin and subcutaneous tissue 08/07/2019    History of partial thickness sunburn    Personal history of other diseases of male genital organs     History of prostatitis    Personal history of other diseases of the circulatory system     History of hypertension    Personal history of other diseases of the musculoskeletal system and connective tissue     History of osteoarthritis    Personal history of other diseases of the musculoskeletal system and connective tissue     History of bursitis    Personal history of other diseases of the  nervous system and sense organs     History of cataract    Personal history of other diseases of the respiratory system 06/25/2018    History of acute bronchitis    Personal history of other diseases of the respiratory system     History of bronchitis    Personal history of other diseases of urinary system 04/19/2017    History of hematuria    Personal history of other endocrine, nutritional and metabolic disease     History of hyperlipidemia    Personal history of other endocrine, nutritional and metabolic disease     History of obesity    Personal history of other endocrine, nutritional and metabolic disease     History of diabetic retinopathy    Personal history of other medical treatment     History of stress test    Personal history of other specified conditions 05/14/2020    History of edema    Personal history of other specified conditions 10/21/2021    History of fatigue    Personal history of other specified conditions 06/19/2013    History of elevated prostate specific antigen (PSA)    Personal history of urinary (tract) infections 01/13/2022    History of urinary tract infection    Proteinuria, unspecified     Proteinuria    Spondylosis without myelopathy or radiculopathy, cervical region     Degenerative joint disease of cervical spine    Sprain of ribs, initial encounter 09/09/2021    Sprain of ribs    Sprain of ribs, initial encounter 09/09/2021    Sprain of costal cartilage, initial encounter    Strain of muscle, fascia and tendon of lower back, subsequent encounter 09/10/2019    Lumbosacral strain, subsequent encounter    Type 2 diabetes mellitus with mild nonproliferative diabetic retinopathy without macular edema, bilateral 12/09/2021    Type 2 diabetes mellitus with mild nonproliferative diabetic retinopathy without macular edema, bilateral    Varicose veins of unspecified lower extremity with ulcer of unspecified site (CODE) 10/08/2021    Venous stasis ulcer with varicose veins of lower extremity    [2]   Past Surgical History:  Procedure Laterality Date    CATARACT EXTRACTION  06/19/2013    Cataract Surgery    CHOLECYSTECTOMY  02/06/2017    Cholecystectomy    COLONOSCOPY  02/06/2017    Colonoscopy    CYSTOSCOPY  02/06/2017    Diagnostic Cystoscopy    ESOPHAGOGASTRODUODENOSCOPY  02/06/2017    Esophagogastroduodenoscopy With Biopsy    GALLBLADDER SURGERY  06/19/2013    Gallbladder Surgery    KNEE SURGERY  02/06/2017    Knee Surgery Left    KNEE SURGERY  02/06/2017    Knee Surgery Right    PROSTATECTOMY  06/19/2013    Prostatectomy Perineal Radical    TONSILLECTOMY  05/29/2013    Tonsillectomy   [3]   Family History  Problem Relation Name Age of Onset    Pancreatic cancer Mother      Diabetes Mother      Heart disease Mother      Kidney disease Father      Hearing loss Brother      Other (elevated psa) Brother          serum    Other (cardiac disorder) Maternal Grandmother      Other (cardiac disorder) Maternal Grandfather      Other (cardiac disorder) Paternal Grandmother      Other (cardiac disorder) Paternal Grandfather     [4]   Allergies  Allergen Reactions    Cortisone Unknown    Penicillins Rash and Unknown

## 2025-04-17 NOTE — PREPROCEDURE INSTRUCTIONS
Thank you for visiting [facility] (PAT) today for your pre-procedure evaluation, you were seen by     Josie Campbell CNP  Pre Admission Testing  [facility]  [phone]    This summary includes instructions and information to aid you during your perioperative period.  Please read carefully. If you have any questions about your visit today, please call the number listed above.  If you become ill or have any changes to your health before your surgery, please contact your primary care provider and alert your surgeon.        Preparing for your Surgery       Exercises  Preoperative Deep Breathing Exercises  Why it is important to do deep breathing exercises before my surgery?  Deep breathing exercises strengthen your breathing muscles.  This helps you to recover after your surgery and decreases the chance of breathing complications.  How are the deep breathing exercises done?  Sit straight with your back supported.  Breathe in deeply and slowly through your nose. Your lower rib cage should expand and your abdomen may move forward.  Hold that breath for 3 to 5 seconds.  Breathe out through pursed lips, slowly and completely.  Rest and repeat 10 times every hour while awake.  Rest longer if you become dizzy or lightheaded.       Preoperative Brain Exercises    What are brain exercises?  A brain exercise is any activity that engages your thinking (cognitive) skills.    What types of activities are considered brain exercises?  Jigsaw puzzles, crossword puzzles, word jumble, memory games, word search, and many more.  Many can be found free online or on your phone via a mobile maria [employer].    Why should I do brain exercises before my surgery?  More recent research has shown brain exercise before surgery can lower the risk of postoperative delirium (confusion) which can be especially important for older adults.  Patients who did brain exercises for 5 to 10 hours the days before surgery, cut their  risk of postoperative delirium in half up to 1 week after surgery.    Sit-to-Stand Exercise    What is the sit-to-stand exercise?  The sit-to-stand exercise strengthens the muscles of your lower body and muscles in the center of your body (core muscles for stability) helping to maintain and improve your strength and mobility.  How do I do the sit-to-stand exercise?  The goal is to do this exercise without using your arms or hands.  If this is too difficult, use your arms and hands or a chair with armrests to help slowly push yourself to the standing position and lower yourself back to the sitting position. As the movement becomes easier use your arms and hands less.    Steps to the sit-to-stand exercise  Sit up tall in a sturdy chair, knees bent, feet flat on the floor shoulder-width apart.  Shift your hips/pelvis forward in the chair to correctly position yourself for the next movement.  Lean forward at your hips.  Stand up straight putting equal weight on both feet.  Check to be sure you are properly aligned with the chair, in a slow controlled movement sit back down.  Repeat this exercise 10-15 times.  If needed you can do it fewer times until your strength improves.  Rest for 1 minute.  Do another 10-15 sit-to-stand exercises.  Try to do this in the morning and evening.        Instructions    Preoperative Fasting Guidelines    Why must I stop eating and drinking near surgery time?  With sedation, food or liquid in your stomach can enter your lungs causing serious complications  Food can increase nausea and vomiting  When do I need to stop eating and drinking before my surgery?      Do not eat any food or drink any liquids after midnight the night before your surgery/procedure.  You may have sips of water to take medications.            Simple things you can do to help prevent blood clots     Blood clots are blockages that can form in the body's veins. When a blood clot forms in your deep veins, it may be called  a deep vein thrombosis, or DVT for short. Blood clots can happen in any part of the body where blood flows, but they are most common in the arms and legs. If a piece of a blood clot breaks free and travels to the lungs, it is called a pulmonary embolus (PE). A PE can be a very serious problem.         Being in the hospital or having surgery can raise your chances of getting a blood clot because you may not be well enough to move around as much as you normally do.         Ways you can help prevent blood clots in the hospital       Wearing SCDs  SCDs stands for Sequential Compression Devices.   SCDs are special sleeves that wrap around your legs. They attach to a pump that fills them with air to gently squeeze your legs every few minutes.  This helps return the blood in your legs to your heart.   SCDs should only be taken off when walking or bathing. SCDs may not be comfortable, but they can help save your life.              Pump SCD leg sleeves  Wearing compression stockings - if your doctor orders them. These special snug-fitting stockings gently squeeze your legs to help blood flow.       Walking. Walking helps move the blood in your legs.   If your doctor says it is ok, try walking the halls at least   5 times a day. Ask us to help you get up, so you don't fall.      Taking any blood-thinning medicines your doctor orders.              Ways you can help prevent blood clots at home         Wearing compression stockings - if your doctor orders them.   Walking - to help move the blood in your legs.    Taking any blood-thinning medicines your doctor orders.      Signs of a blood clot or PE    Tell your doctor or nurse right away if you have any of the problems listed below.         If you are at home, seek medical care right away. Call 911 for chest pain or problems breathing.            Signs of a blood clot (DVT) - such as pain, swelling, redness, or warmth in your arm or legs.  Signs of a pulmonary embolism (PE) -  such as chest pain or feeling short of breath      Tobacco and Alcohol;  Do not drink alcohol or smoke within 24 hours of surgery.  It is best to quit smoking for as long as possible before any surgery or procedure.        The Week before Surgery        Seven days before Surgery  Check your PAT medication instructions  Do the exercises provided to you by PAT  Arrange for a responsible, adult licensed  to take you home after surgery and stay with you for 24 hours.  You will not be permitted to drive yourself home if you have received any anesthetic/sedation  Six days before surgery  Check your PAT medication instructions  Do the exercises provided to you by PAT  Start using Chlorhexidene (CHG) body wash if prescribed  Five days before surgery  Check your PAT medication instructions  Do the exercises provided to you by PAT  Continue to use CHG body wash if prescribed  Three days before surgery  Check your PAT medication instructions  Do the exercises provided to you by PAT  Continue to use CHG body wash if prescribed  Two days before surgery  Check your PAT medication instructions  Do the exercises provided to you by PAT  Continue to use CHG body wash if prescribed    The Day before Surgery       Check your PAT medication and all other PAT instructions including when to stop eating and drinking  You will be called with your arrival time for surgery in the late afternoon.  If you do not receive a call please reach out to [facility] Pre-Op. [phone]  Do not smoke or drink 24 hours before surgery  Prepare items to bring with you to the hospital  Shower with your chlorhexidine wash if prescribed  Brush your teeth and use your chlorhexidine dental rinse if prescribed    The Day of Surgery       Check your PAT medication instructions  Ensure you follow the instructions for when to stop eating and drinking  Shower, if prescribed use CHG.  Do not apply any lotions, creams, moisturizers, perfume or deodorant  Brush your  teeth and use your CHG dental rinse if prescribed  Wear loose comfortable clothing  Avoid make-up  Remove  jewelry and piercings, consider professional piercing removal with a plastic spacer if needed  Bring photo ID and Insurance card  Bring an accurate medication list that includes medication dose, frequency and allergies  Bring a copy of your advanced directives (will, health care power of )  Bring any devices and controllers as well as medical devices you have been provided with for surgery (CPAP, slings, braces, etc.)  Dentures, eyeglasses, and contacts will be removed before surgery, please bring cases for contacts or glasses

## 2025-04-19 DIAGNOSIS — E78.00 ELEVATED LDL CHOLESTEROL LEVEL: ICD-10-CM

## 2025-04-19 LAB — STAPHYLOCOCCUS SPEC CULT: ABNORMAL

## 2025-04-22 ENCOUNTER — APPOINTMENT (OUTPATIENT)
Dept: PHYSICAL THERAPY | Facility: CLINIC | Age: 80
End: 2025-04-22
Payer: MEDICARE

## 2025-04-22 RX ORDER — PRAVASTATIN SODIUM 20 MG/1
TABLET ORAL
Qty: 90 TABLET | Refills: 1 | Status: SHIPPED | OUTPATIENT
Start: 2025-04-22

## 2025-04-25 ENCOUNTER — TREATMENT (OUTPATIENT)
Dept: PHYSICAL THERAPY | Facility: CLINIC | Age: 80
End: 2025-04-25
Payer: MEDICARE

## 2025-04-25 DIAGNOSIS — M54.42 CHRONIC BILATERAL LOW BACK PAIN WITH LEFT-SIDED SCIATICA: ICD-10-CM

## 2025-04-25 DIAGNOSIS — R29.898 WEAKNESS OF BOTH LOWER EXTREMITIES: ICD-10-CM

## 2025-04-25 DIAGNOSIS — G89.29 CHRONIC BILATERAL LOW BACK PAIN WITH LEFT-SIDED SCIATICA: ICD-10-CM

## 2025-04-25 DIAGNOSIS — M25.552 HIP PAIN, LEFT: Primary | ICD-10-CM

## 2025-04-25 PROCEDURE — 97110 THERAPEUTIC EXERCISES: CPT | Mod: GP,CQ

## 2025-04-25 NOTE — H&P (VIEW-ONLY)
"Physical Therapy                                                                                  Physical Therapy Treatment       Patient name: Michael Szymanski  MRN:   [id number]  Today's Date: 4/25/25     Time Calculation  Start Time: 1100  Stop Time: 1145  Time Calculation (min): 45 min  1. Hip pain, left        2. Chronic bilateral low back pain with left-sided sciatica  Follow Up In Physical Therapy      3. Weakness of both lower extremities           General:  Insurance - [facility], Authorization required.  Visit#4  Precautions:  HTN controlled with BP meds,  DM II, Kidney Stage IV, h/o TKR's bilateral.   STEADI = 4 points (4 points or >> indicates increased risk for falls.              Assessment:  Tolerates exercises performed in semi reclined position notes decrease in to zero pain. Pain symptoms return with WB ing , WB ing exercise . On hold until after surgery and doctors ok to resume therapy . Has been scheduled for 3rd week of May for a re check  Plan:      To hold therapy until surgery completed and given clearance to return    Subjective: Back pain off and on , right now pain is felt in  L hip with radiating towards back but not quite there yet . Pt will be putting therapy bon hold until after upcoming surgery which is to have section of colon removed .  Response to last session:     Performing HEP: sporadically    Pain   4/10 L hip   Less when laying dowm=n   Returned to 4/10 with standing   Objective:  Decrease pain, improve safety awareness, develop HEP, increase strength of core and LE   Treatment: Therapeutic  therapeutic Exercise 44 minutes  Semi reclined 2 wedge and 1 pillow   Hook lying hip Adduction isoball squeeze at knees 2 sets 10 reps  Abdominal isometric  ball press between hand and knee 10 reps each side     TA bracing Hip /knee extension from hook lying alternating  2 sets 10 reps bilaterally   TA bracing Hook lying clamshell 20 reps orange t-band resistance  SKTC 6 reps 10\" holds " L/R  STANDING  MIP 10 reps x 2 sets   Hip Abduction lifts  10 reps  x 2 sets   Heel Raises  10 reps x 2 sets  Seated rest breaks spaced throughout session = 1 minute  Education:   Pt rescheduled top be seen by jacy PT when he returns to clinic .  Pt has been provided handout with clinic policy on cancels/no shows     Bianka Morrison PTA    Active       PT Problem       Patient will report decrease in pain level  by >/= 80% in order to perform standing and walking, ADLs, IADLs,  and leisure/work/household activities with minimal to no restrictions.        Start:  03/05/25    Expected End:  05/28/25            Patient will demonstrate independence and compliance with safe and recommended  HEP in order to maximize and maintain functional gains made in physical therapy.        Start:  03/05/25    Expected End:  05/28/25            Patient will increase core and B hip, knees, and ankle strength by 1/3-2/3 muscle grade MMT  in order to improve standing and walking tolerance,and participate without restriction in ADL, IADL, leisure activities, household and/or work tasks.        Start:  03/05/25    Expected End:  05/28/25            Patient will improve standing balance as evident by a score of 3 / 6 or greater on Romberg in order to increase stability in gait, decrease risk of falls and improve safety with functional mobility and ADLs.        Start:  03/05/25    Expected End:  05/28/25            Patient with improved activity tolerance with standing and walking using appropriate assistive device, and improved gait pattern due to increased strength and balance.       Start:  03/05/25    Expected End:  05/28/25

## 2025-04-25 NOTE — PROGRESS NOTES
"Physical Therapy                                                                                  Physical Therapy Treatment       Patient name: Michael Szymanski  MRN:   [id number]  Today's Date: 4/25/25     Time Calculation  Start Time: 1100  Stop Time: 1145  Time Calculation (min): 45 min  1. Hip pain, left        2. Chronic bilateral low back pain with left-sided sciatica  Follow Up In Physical Therapy      3. Weakness of both lower extremities           General:  Insurance - [facility], Authorization required.  Visit#4  Precautions:  HTN controlled with BP meds,  DM II, Kidney Stage IV, h/o TKR's bilateral.   STEADI = 4 points (4 points or >> indicates increased risk for falls.              Assessment:  Tolerates exercises performed in semi reclined position notes decrease in to zero pain. Pain symptoms return with WB ing , WB ing exercise . On hold until after surgery and doctors ok to resume therapy . Has been scheduled for 3rd week of May for a re check  Plan:      To hold therapy until surgery completed and given clearance to return    Subjective: Back pain off and on , right now pain is felt in  L hip with radiating towards back but not quite there yet . Pt will be putting therapy bon hold until after upcoming surgery which is to have section of colon removed .  Response to last session:     Performing HEP: sporadically    Pain   4/10 L hip   Less when laying dowm=n   Returned to 4/10 with standing   Objective:  Decrease pain, improve safety awareness, develop HEP, increase strength of core and LE   Treatment: Therapeutic  therapeutic Exercise 44 minutes  Semi reclined 2 wedge and 1 pillow   Hook lying hip Adduction isoball squeeze at knees 2 sets 10 reps  Abdominal isometric  ball press between hand and knee 10 reps each side     TA bracing Hip /knee extension from hook lying alternating  2 sets 10 reps bilaterally   TA bracing Hook lying clamshell 20 reps orange t-band resistance  SKTC 6 reps 10\" holds " L/R  STANDING  MIP 10 reps x 2 sets   Hip Abduction lifts  10 reps  x 2 sets   Heel Raises  10 reps x 2 sets  Seated rest breaks spaced throughout session = 1 minute  Education:   Pt rescheduled top be seen by jacy PT when he returns to clinic .  Pt has been provided handout with clinic policy on cancels/no shows     Bianka Morrison PTA    Active       PT Problem       Patient will report decrease in pain level  by >/= 80% in order to perform standing and walking, ADLs, IADLs,  and leisure/work/household activities with minimal to no restrictions.        Start:  03/05/25    Expected End:  05/28/25            Patient will demonstrate independence and compliance with safe and recommended  HEP in order to maximize and maintain functional gains made in physical therapy.        Start:  03/05/25    Expected End:  05/28/25            Patient will increase core and B hip, knees, and ankle strength by 1/3-2/3 muscle grade MMT  in order to improve standing and walking tolerance,and participate without restriction in ADL, IADL, leisure activities, household and/or work tasks.        Start:  03/05/25    Expected End:  05/28/25            Patient will improve standing balance as evident by a score of 3 / 6 or greater on Romberg in order to increase stability in gait, decrease risk of falls and improve safety with functional mobility and ADLs.        Start:  03/05/25    Expected End:  05/28/25            Patient with improved activity tolerance with standing and walking using appropriate assistive device, and improved gait pattern due to increased strength and balance.       Start:  03/05/25    Expected End:  05/28/25

## 2025-04-28 ENCOUNTER — ANESTHESIA EVENT (OUTPATIENT)
Dept: OPERATING ROOM | Facility: HOSPITAL | Age: 80
End: 2025-04-28
Payer: MEDICARE

## 2025-04-30 ENCOUNTER — APPOINTMENT (OUTPATIENT)
Dept: PHYSICAL THERAPY | Facility: CLINIC | Age: 80
End: 2025-04-30
Payer: MEDICARE

## 2025-05-01 ENCOUNTER — ANESTHESIA (OUTPATIENT)
Dept: OPERATING ROOM | Facility: HOSPITAL | Age: 80
End: 2025-05-01
Payer: MEDICARE

## 2025-05-01 ENCOUNTER — HOSPITAL ENCOUNTER (OUTPATIENT)
Dept: OPERATING ROOM | Facility: HOSPITAL | Age: 80
Discharge: HOME | End: 2025-05-01
Payer: MEDICARE

## 2025-05-01 ENCOUNTER — HOSPITAL ENCOUNTER (INPATIENT)
Facility: HOSPITAL | Age: 80
LOS: 1 days | Discharge: HOME | End: 2025-05-02
Attending: SURGERY | Admitting: SURGERY
Payer: MEDICARE

## 2025-05-01 DIAGNOSIS — C18.2 MALIGNANT NEOPLASM OF ASCENDING COLON (MULTI): ICD-10-CM

## 2025-05-01 DIAGNOSIS — C18.2 MALIGNANT NEOPLASM OF ASCENDING COLON (MULTI): Primary | ICD-10-CM

## 2025-05-01 LAB
ABO GROUP (TYPE) IN BLOOD: NORMAL
GLUCOSE BLD MANUAL STRIP-MCNC: 161 MG/DL (ref 74–99)
GLUCOSE BLD MANUAL STRIP-MCNC: 168 MG/DL (ref 74–99)
GLUCOSE BLD MANUAL STRIP-MCNC: 175 MG/DL (ref 74–99)
GLUCOSE BLD MANUAL STRIP-MCNC: 65 MG/DL (ref 74–99)
GLUCOSE BLD MANUAL STRIP-MCNC: 68 MG/DL (ref 74–99)
GLUCOSE BLD MANUAL STRIP-MCNC: 73 MG/DL (ref 74–99)
RH FACTOR (ANTIGEN D): NORMAL

## 2025-05-01 PROCEDURE — 88309 TISSUE EXAM BY PATHOLOGIST: CPT | Mod: TC,GEALAB,WESLAB | Performed by: SURGERY

## 2025-05-01 PROCEDURE — 36415 COLL VENOUS BLD VENIPUNCTURE: CPT | Performed by: SURGERY

## 2025-05-01 PROCEDURE — 0WQF0ZZ REPAIR ABDOMINAL WALL, OPEN APPROACH: ICD-10-PCS | Performed by: SURGERY

## 2025-05-01 PROCEDURE — 9420000001 HC RT PATIENT EDUCATION 5 MIN

## 2025-05-01 PROCEDURE — 2500000004 HC RX 250 GENERAL PHARMACY W/ HCPCS (ALT 636 FOR OP/ED): Performed by: NURSE ANESTHETIST, CERTIFIED REGISTERED

## 2025-05-01 PROCEDURE — 1100000001 HC PRIVATE ROOM DAILY

## 2025-05-01 PROCEDURE — 2500000001 HC RX 250 WO HCPCS SELF ADMINISTERED DRUGS (ALT 637 FOR MEDICARE OP): Performed by: SURGERY

## 2025-05-01 PROCEDURE — 2500000004 HC RX 250 GENERAL PHARMACY W/ HCPCS (ALT 636 FOR OP/ED): Mod: JZ | Performed by: ANESTHESIOLOGY

## 2025-05-01 PROCEDURE — 2500000002 HC RX 250 W HCPCS SELF ADMINISTERED DRUGS (ALT 637 FOR MEDICARE OP, ALT 636 FOR OP/ED): Performed by: SURGERY

## 2025-05-01 PROCEDURE — 44205 LAP COLECTOMY PART W/ILEUM: CPT | Performed by: SURGERY

## 2025-05-01 PROCEDURE — 2500000005 HC RX 250 GENERAL PHARMACY W/O HCPCS: Performed by: ANESTHESIOLOGY

## 2025-05-01 PROCEDURE — 3700000002 HC GENERAL ANESTHESIA TIME - EACH INCREMENTAL 1 MINUTE: Performed by: SURGERY

## 2025-05-01 PROCEDURE — 82947 ASSAY GLUCOSE BLOOD QUANT: CPT

## 2025-05-01 PROCEDURE — 7100000001 HC RECOVERY ROOM TIME - INITIAL BASE CHARGE: Performed by: SURGERY

## 2025-05-01 PROCEDURE — A44204 PR LAP,SURG,COLECTOMY, PARTIAL, W/ANAST: Performed by: ANESTHESIOLOGY

## 2025-05-01 PROCEDURE — 3600000009 HC OR TIME - EACH INCREMENTAL 1 MINUTE - PROCEDURE LEVEL FOUR: Performed by: SURGERY

## 2025-05-01 PROCEDURE — A44204 PR LAP,SURG,COLECTOMY, PARTIAL, W/ANAST: Performed by: NURSE ANESTHETIST, CERTIFIED REGISTERED

## 2025-05-01 PROCEDURE — 88309 TISSUE EXAM BY PATHOLOGIST: CPT | Performed by: STUDENT IN AN ORGANIZED HEALTH CARE EDUCATION/TRAINING PROGRAM

## 2025-05-01 PROCEDURE — 7100000002 HC RECOVERY ROOM TIME - EACH INCREMENTAL 1 MINUTE: Performed by: SURGERY

## 2025-05-01 PROCEDURE — 99100 ANES PT EXTEME AGE<1 YR&>70: CPT | Performed by: ANESTHESIOLOGY

## 2025-05-01 PROCEDURE — 2720000007 HC OR 272 NO HCPCS: Performed by: SURGERY

## 2025-05-01 PROCEDURE — 0DTF4ZZ RESECTION OF RIGHT LARGE INTESTINE, PERCUTANEOUS ENDOSCOPIC APPROACH: ICD-10-PCS | Performed by: SURGERY

## 2025-05-01 PROCEDURE — 49591 RPR AA HRN 1ST < 3 CM RDC: CPT | Performed by: SURGERY

## 2025-05-01 PROCEDURE — 3700000001 HC GENERAL ANESTHESIA TIME - INITIAL BASE CHARGE: Performed by: SURGERY

## 2025-05-01 PROCEDURE — 2500000004 HC RX 250 GENERAL PHARMACY W/ HCPCS (ALT 636 FOR OP/ED): Mod: JZ | Performed by: NURSE ANESTHETIST, CERTIFIED REGISTERED

## 2025-05-01 PROCEDURE — 96372 THER/PROPH/DIAG INJ SC/IM: CPT | Performed by: SURGERY

## 2025-05-01 PROCEDURE — 3600000004 HC OR TIME - INITIAL BASE CHARGE - PROCEDURE LEVEL FOUR: Performed by: SURGERY

## 2025-05-01 PROCEDURE — 2500000004 HC RX 250 GENERAL PHARMACY W/ HCPCS (ALT 636 FOR OP/ED): Performed by: SURGERY

## 2025-05-01 RX ORDER — CEFAZOLIN 1 G/1
INJECTION, POWDER, FOR SOLUTION INTRAVENOUS AS NEEDED
Status: DISCONTINUED | OUTPATIENT
Start: 2025-05-01 | End: 2025-05-01

## 2025-05-01 RX ORDER — SODIUM CHLORIDE, SODIUM LACTATE, POTASSIUM CHLORIDE, CALCIUM CHLORIDE 600; 310; 30; 20 MG/100ML; MG/100ML; MG/100ML; MG/100ML
20 INJECTION, SOLUTION INTRAVENOUS CONTINUOUS
Status: DISCONTINUED | OUTPATIENT
Start: 2025-05-01 | End: 2025-05-01

## 2025-05-01 RX ORDER — METOPROLOL SUCCINATE 50 MG/1
50 TABLET, EXTENDED RELEASE ORAL DAILY
Status: DISCONTINUED | OUTPATIENT
Start: 2025-05-02 | End: 2025-05-02 | Stop reason: HOSPADM

## 2025-05-01 RX ORDER — GABAPENTIN 300 MG/1
300 CAPSULE ORAL ONCE
Status: DISCONTINUED | OUTPATIENT
Start: 2025-05-01 | End: 2025-05-01

## 2025-05-01 RX ORDER — POLYETHYLENE GLYCOL 3350 17 G/17G
17 POWDER, FOR SOLUTION ORAL DAILY
Status: DISCONTINUED | OUTPATIENT
Start: 2025-05-01 | End: 2025-05-02 | Stop reason: HOSPADM

## 2025-05-01 RX ORDER — OXYCODONE HYDROCHLORIDE 5 MG/1
5 TABLET ORAL EVERY 4 HOURS PRN
Status: DISCONTINUED | OUTPATIENT
Start: 2025-05-01 | End: 2025-05-02 | Stop reason: HOSPADM

## 2025-05-01 RX ORDER — ONDANSETRON HYDROCHLORIDE 2 MG/ML
4 INJECTION, SOLUTION INTRAVENOUS ONCE AS NEEDED
Status: DISCONTINUED | OUTPATIENT
Start: 2025-05-01 | End: 2025-05-01 | Stop reason: HOSPADM

## 2025-05-01 RX ORDER — DROPERIDOL 2.5 MG/ML
0.62 INJECTION, SOLUTION INTRAMUSCULAR; INTRAVENOUS ONCE AS NEEDED
Status: DISCONTINUED | OUTPATIENT
Start: 2025-05-01 | End: 2025-05-01 | Stop reason: HOSPADM

## 2025-05-01 RX ORDER — DIPHENHYDRAMINE HYDROCHLORIDE 50 MG/ML
12.5 INJECTION, SOLUTION INTRAMUSCULAR; INTRAVENOUS ONCE AS NEEDED
Status: DISCONTINUED | OUTPATIENT
Start: 2025-05-01 | End: 2025-05-01 | Stop reason: HOSPADM

## 2025-05-01 RX ORDER — PROPOFOL 10 MG/ML
INJECTION, EMULSION INTRAVENOUS AS NEEDED
Status: DISCONTINUED | OUTPATIENT
Start: 2025-05-01 | End: 2025-05-01

## 2025-05-01 RX ORDER — INSULIN LISPRO 100 [IU]/ML
0-10 INJECTION, SOLUTION INTRAVENOUS; SUBCUTANEOUS
Status: DISCONTINUED | OUTPATIENT
Start: 2025-05-02 | End: 2025-05-02 | Stop reason: HOSPADM

## 2025-05-01 RX ORDER — ONDANSETRON HYDROCHLORIDE 2 MG/ML
4 INJECTION, SOLUTION INTRAVENOUS EVERY 6 HOURS PRN
Status: DISCONTINUED | OUTPATIENT
Start: 2025-05-01 | End: 2025-05-02 | Stop reason: HOSPADM

## 2025-05-01 RX ORDER — METRONIDAZOLE 500 MG/100ML
INJECTION, SOLUTION INTRAVENOUS AS NEEDED
Status: DISCONTINUED | OUTPATIENT
Start: 2025-05-01 | End: 2025-05-01

## 2025-05-01 RX ORDER — DEXTROSE 50 % IN WATER (D50W) INTRAVENOUS SYRINGE
25
Status: DISCONTINUED | OUTPATIENT
Start: 2025-05-01 | End: 2025-05-02 | Stop reason: HOSPADM

## 2025-05-01 RX ORDER — ONDANSETRON HYDROCHLORIDE 2 MG/ML
INJECTION, SOLUTION INTRAVENOUS AS NEEDED
Status: DISCONTINUED | OUTPATIENT
Start: 2025-05-01 | End: 2025-05-01

## 2025-05-01 RX ORDER — ENOXAPARIN SODIUM 100 MG/ML
30 INJECTION SUBCUTANEOUS ONCE
Status: COMPLETED | OUTPATIENT
Start: 2025-05-01 | End: 2025-05-01

## 2025-05-01 RX ORDER — NAPROXEN SODIUM 220 MG/1
81 TABLET, FILM COATED ORAL DAILY
Status: DISCONTINUED | OUTPATIENT
Start: 2025-05-01 | End: 2025-05-02 | Stop reason: HOSPADM

## 2025-05-01 RX ORDER — GABAPENTIN 100 MG/1
100 CAPSULE ORAL 2 TIMES DAILY
Status: DISCONTINUED | OUTPATIENT
Start: 2025-05-01 | End: 2025-05-02 | Stop reason: HOSPADM

## 2025-05-01 RX ORDER — ACETAMINOPHEN 325 MG/1
650 TABLET ORAL ONCE
Status: COMPLETED | OUTPATIENT
Start: 2025-05-01 | End: 2025-05-01

## 2025-05-01 RX ORDER — TRAMADOL HYDROCHLORIDE 50 MG/1
50 TABLET, FILM COATED ORAL EVERY 4 HOURS PRN
Status: DISCONTINUED | OUTPATIENT
Start: 2025-05-01 | End: 2025-05-02 | Stop reason: HOSPADM

## 2025-05-01 RX ORDER — PRAVASTATIN SODIUM 40 MG/1
20 TABLET ORAL NIGHTLY
Status: DISCONTINUED | OUTPATIENT
Start: 2025-05-01 | End: 2025-05-02 | Stop reason: HOSPADM

## 2025-05-01 RX ORDER — INSULIN GLARGINE 100 [IU]/ML
30 INJECTION, SOLUTION SUBCUTANEOUS NIGHTLY
Status: DISCONTINUED | OUTPATIENT
Start: 2025-05-01 | End: 2025-05-02 | Stop reason: HOSPADM

## 2025-05-01 RX ORDER — LIDOCAINE HYDROCHLORIDE 20 MG/ML
INJECTION, SOLUTION INFILTRATION; PERINEURAL AS NEEDED
Status: DISCONTINUED | OUTPATIENT
Start: 2025-05-01 | End: 2025-05-01

## 2025-05-01 RX ORDER — ROCURONIUM BROMIDE 10 MG/ML
INJECTION, SOLUTION INTRAVENOUS AS NEEDED
Status: DISCONTINUED | OUTPATIENT
Start: 2025-05-01 | End: 2025-05-01

## 2025-05-01 RX ORDER — IBUPROFEN 600 MG/1
600 TABLET, FILM COATED ORAL EVERY 6 HOURS SCHEDULED
Status: DISCONTINUED | OUTPATIENT
Start: 2025-05-01 | End: 2025-05-02 | Stop reason: HOSPADM

## 2025-05-01 RX ORDER — AMLODIPINE BESYLATE 10 MG/1
10 TABLET ORAL EVERY 24 HOURS
Status: DISCONTINUED | OUTPATIENT
Start: 2025-05-02 | End: 2025-05-02 | Stop reason: HOSPADM

## 2025-05-01 RX ORDER — HYDROMORPHONE HYDROCHLORIDE 2 MG/ML
INJECTION, SOLUTION INTRAMUSCULAR; INTRAVENOUS; SUBCUTANEOUS AS NEEDED
Status: DISCONTINUED | OUTPATIENT
Start: 2025-05-01 | End: 2025-05-01

## 2025-05-01 RX ORDER — CYCLOBENZAPRINE HCL 5 MG
5 TABLET ORAL 3 TIMES DAILY
Status: DISCONTINUED | OUTPATIENT
Start: 2025-05-01 | End: 2025-05-02 | Stop reason: HOSPADM

## 2025-05-01 RX ORDER — CEFAZOLIN SODIUM 2 G/100ML
2 INJECTION, SOLUTION INTRAVENOUS EVERY 6 HOURS
Status: COMPLETED | OUTPATIENT
Start: 2025-05-01 | End: 2025-05-02

## 2025-05-01 RX ORDER — BUPIVACAINE HYDROCHLORIDE 5 MG/ML
INJECTION, SOLUTION PERINEURAL AS NEEDED
Status: DISCONTINUED | OUTPATIENT
Start: 2025-05-01 | End: 2025-05-01 | Stop reason: HOSPADM

## 2025-05-01 RX ORDER — DEXTROSE 50 % IN WATER (D50W) INTRAVENOUS SYRINGE
6.25 ONCE
Status: COMPLETED | OUTPATIENT
Start: 2025-05-01 | End: 2025-05-01

## 2025-05-01 RX ORDER — ALBUTEROL SULFATE 0.83 MG/ML
2.5 SOLUTION RESPIRATORY (INHALATION) ONCE AS NEEDED
Status: DISCONTINUED | OUTPATIENT
Start: 2025-05-01 | End: 2025-05-01 | Stop reason: HOSPADM

## 2025-05-01 RX ORDER — ENOXAPARIN SODIUM 100 MG/ML
40 INJECTION SUBCUTANEOUS EVERY 24 HOURS
Status: DISCONTINUED | OUTPATIENT
Start: 2025-05-02 | End: 2025-05-02 | Stop reason: HOSPADM

## 2025-05-01 RX ORDER — MIDAZOLAM HYDROCHLORIDE 1 MG/ML
INJECTION, SOLUTION INTRAMUSCULAR; INTRAVENOUS AS NEEDED
Status: DISCONTINUED | OUTPATIENT
Start: 2025-05-01 | End: 2025-05-01

## 2025-05-01 RX ORDER — ACETAMINOPHEN 325 MG/1
650 TABLET ORAL EVERY 6 HOURS
Status: DISCONTINUED | OUTPATIENT
Start: 2025-05-01 | End: 2025-05-02 | Stop reason: HOSPADM

## 2025-05-01 RX ORDER — FENTANYL CITRATE 50 UG/ML
INJECTION, SOLUTION INTRAMUSCULAR; INTRAVENOUS AS NEEDED
Status: DISCONTINUED | OUTPATIENT
Start: 2025-05-01 | End: 2025-05-01

## 2025-05-01 RX ORDER — DEXTROSE 50 % IN WATER (D50W) INTRAVENOUS SYRINGE
12.5
Status: DISCONTINUED | OUTPATIENT
Start: 2025-05-01 | End: 2025-05-02 | Stop reason: HOSPADM

## 2025-05-01 RX ORDER — SODIUM CHLORIDE, SODIUM LACTATE, POTASSIUM CHLORIDE, CALCIUM CHLORIDE 600; 310; 30; 20 MG/100ML; MG/100ML; MG/100ML; MG/100ML
100 INJECTION, SOLUTION INTRAVENOUS CONTINUOUS
Status: DISCONTINUED | OUTPATIENT
Start: 2025-05-01 | End: 2025-05-01 | Stop reason: HOSPADM

## 2025-05-01 RX ORDER — ESMOLOL HYDROCHLORIDE 10 MG/ML
INJECTION INTRAVENOUS AS NEEDED
Status: DISCONTINUED | OUTPATIENT
Start: 2025-05-01 | End: 2025-05-01

## 2025-05-01 RX ORDER — METRONIDAZOLE 500 MG/100ML
500 INJECTION, SOLUTION INTRAVENOUS EVERY 8 HOURS
Status: COMPLETED | OUTPATIENT
Start: 2025-05-01 | End: 2025-05-02

## 2025-05-01 RX ORDER — OXYCODONE HYDROCHLORIDE 5 MG/1
5 TABLET ORAL EVERY 4 HOURS PRN
Status: DISCONTINUED | OUTPATIENT
Start: 2025-05-01 | End: 2025-05-01 | Stop reason: HOSPADM

## 2025-05-01 RX ORDER — DEXTROSE MONOHYDRATE AND SODIUM CHLORIDE 5; .9 G/100ML; G/100ML
INJECTION, SOLUTION INTRAVENOUS CONTINUOUS PRN
Status: DISCONTINUED | OUTPATIENT
Start: 2025-05-01 | End: 2025-05-01

## 2025-05-01 RX ORDER — MEPERIDINE HYDROCHLORIDE 25 MG/ML
12.5 INJECTION INTRAMUSCULAR; INTRAVENOUS; SUBCUTANEOUS EVERY 10 MIN PRN
Status: DISCONTINUED | OUTPATIENT
Start: 2025-05-01 | End: 2025-05-01 | Stop reason: HOSPADM

## 2025-05-01 RX ADMIN — CYCLOBENZAPRINE HYDROCHLORIDE 5 MG: 5 TABLET, FILM COATED ORAL at 22:01

## 2025-05-01 RX ADMIN — ROCURONIUM BROMIDE 10 MG: 10 INJECTION, SOLUTION INTRAVENOUS at 14:27

## 2025-05-01 RX ADMIN — GABAPENTIN 100 MG: 100 CAPSULE ORAL at 22:02

## 2025-05-01 RX ADMIN — DEXTROSE MONOHYDRATE 6.5 G: 25 INJECTION, SOLUTION INTRAVENOUS at 12:03

## 2025-05-01 RX ADMIN — PROPOFOL 40 MG: 10 INJECTION, EMULSION INTRAVENOUS at 14:05

## 2025-05-01 RX ADMIN — ROCURONIUM BROMIDE 50 MG: 10 INJECTION, SOLUTION INTRAVENOUS at 13:21

## 2025-05-01 RX ADMIN — MIDAZOLAM 0.5 MG: 1 INJECTION INTRAMUSCULAR; INTRAVENOUS at 13:14

## 2025-05-01 RX ADMIN — METRONIDAZOLE 500 MG: 500 INJECTION, SOLUTION INTRAVENOUS at 13:37

## 2025-05-01 RX ADMIN — LIDOCAINE HYDROCHLORIDE 60 MG: 20 INJECTION, SOLUTION INFILTRATION; PERINEURAL at 13:19

## 2025-05-01 RX ADMIN — METRONIDAZOLE 500 MG: 500 INJECTION, SOLUTION INTRAVENOUS at 22:33

## 2025-05-01 RX ADMIN — ROCURONIUM BROMIDE 20 MG: 10 INJECTION, SOLUTION INTRAVENOUS at 15:02

## 2025-05-01 RX ADMIN — ROCURONIUM BROMIDE 20 MG: 10 INJECTION, SOLUTION INTRAVENOUS at 13:41

## 2025-05-01 RX ADMIN — DEXAMETHASONE SODIUM PHOSPHATE 4 MG: 4 INJECTION INTRA-ARTICULAR; INTRALESIONAL; INTRAMUSCULAR; INTRAVENOUS; SOFT TISSUE at 13:29

## 2025-05-01 RX ADMIN — CEFAZOLIN SODIUM 2 G: 2 INJECTION, SOLUTION INTRAVENOUS at 22:05

## 2025-05-01 RX ADMIN — PRAVASTATIN SODIUM 20 MG: 40 TABLET ORAL at 22:02

## 2025-05-01 RX ADMIN — HYDROMORPHONE HYDROCHLORIDE 0.4 MG: 2 INJECTION, SOLUTION INTRAMUSCULAR; INTRAVENOUS; SUBCUTANEOUS at 14:52

## 2025-05-01 RX ADMIN — SODIUM CHLORIDE, SODIUM LACTATE, POTASSIUM CHLORIDE, AND CALCIUM CHLORIDE 20 ML/HR: .6; .31; .03; .02 INJECTION, SOLUTION INTRAVENOUS at 11:22

## 2025-05-01 RX ADMIN — INSULIN GLARGINE 30 UNITS: 100 INJECTION, SOLUTION SUBCUTANEOUS at 22:07

## 2025-05-01 RX ADMIN — ENOXAPARIN SODIUM 30 MG: 30 INJECTION SUBCUTANEOUS at 11:22

## 2025-05-01 RX ADMIN — FENTANYL CITRATE 50 MCG: 50 INJECTION, SOLUTION INTRAMUSCULAR; INTRAVENOUS at 13:50

## 2025-05-01 RX ADMIN — HYDROMORPHONE HYDROCHLORIDE 0.6 MG: 2 INJECTION, SOLUTION INTRAMUSCULAR; INTRAVENOUS; SUBCUTANEOUS at 15:55

## 2025-05-01 RX ADMIN — Medication 10 L/MIN: at 16:26

## 2025-05-01 RX ADMIN — PROPOFOL 30 MG: 10 INJECTION, EMULSION INTRAVENOUS at 13:54

## 2025-05-01 RX ADMIN — Medication 7 L/MIN: at 16:11

## 2025-05-01 RX ADMIN — PROPOFOL 100 MG: 10 INJECTION, EMULSION INTRAVENOUS at 13:19

## 2025-05-01 RX ADMIN — ESMOLOL HYDROCHLORIDE 20 MG: 100 INJECTION, SOLUTION INTRAVENOUS at 14:15

## 2025-05-01 RX ADMIN — PROPOFOL 30 MG: 10 INJECTION, EMULSION INTRAVENOUS at 13:57

## 2025-05-01 RX ADMIN — DEXTROSE AND SODIUM CHLORIDE: 5; 900 INJECTION, SOLUTION INTRAVENOUS at 13:09

## 2025-05-01 RX ADMIN — FENTANYL CITRATE 50 MCG: 50 INJECTION, SOLUTION INTRAMUSCULAR; INTRAVENOUS at 14:13

## 2025-05-01 RX ADMIN — SUGAMMADEX 200 MG: 100 INJECTION, SOLUTION INTRAVENOUS at 15:59

## 2025-05-01 RX ADMIN — Medication 4 L/MIN: at 16:39

## 2025-05-01 RX ADMIN — CEFAZOLIN 2 G: 330 INJECTION, POWDER, FOR SOLUTION INTRAMUSCULAR; INTRAVENOUS at 13:32

## 2025-05-01 RX ADMIN — ONDANSETRON 4 MG: 2 INJECTION, SOLUTION INTRAMUSCULAR; INTRAVENOUS at 15:52

## 2025-05-01 RX ADMIN — DEXAMETHASONE SODIUM PHOSPHATE 4 MG: 4 INJECTION INTRA-ARTICULAR; INTRALESIONAL; INTRAMUSCULAR; INTRAVENOUS; SOFT TISSUE at 15:52

## 2025-05-01 RX ADMIN — ACETAMINOPHEN 650 MG: 325 TABLET, FILM COATED ORAL at 11:22

## 2025-05-01 RX ADMIN — ACETAMINOPHEN 650 MG: 325 TABLET, FILM COATED ORAL at 22:01

## 2025-05-01 SDOH — SOCIAL STABILITY: SOCIAL INSECURITY
WITHIN THE LAST YEAR, HAVE YOU BEEN RAPED OR FORCED TO HAVE ANY KIND OF SEXUAL ACTIVITY BY YOUR PARTNER OR EX-PARTNER?: NO

## 2025-05-01 SDOH — HEALTH STABILITY: MENTAL HEALTH: HOW MANY DRINKS CONTAINING ALCOHOL DO YOU HAVE ON A TYPICAL DAY WHEN YOU ARE DRINKING?: PATIENT UNABLE TO ANSWER

## 2025-05-01 SDOH — HEALTH STABILITY: MENTAL HEALTH: HOW OFTEN DO YOU HAVE SIX OR MORE DRINKS ON ONE OCCASION?: PATIENT UNABLE TO ANSWER

## 2025-05-01 SDOH — SOCIAL STABILITY: SOCIAL INSECURITY: ARE YOU OR HAVE YOU BEEN THREATENED OR ABUSED PHYSICALLY, EMOTIONALLY, OR SEXUALLY BY ANYONE?: NO

## 2025-05-01 SDOH — ECONOMIC STABILITY: FOOD INSECURITY: WITHIN THE PAST 12 MONTHS, YOU WORRIED THAT YOUR FOOD WOULD RUN OUT BEFORE YOU GOT THE MONEY TO BUY MORE.: NEVER TRUE

## 2025-05-01 SDOH — ECONOMIC STABILITY: FOOD INSECURITY: WITHIN THE PAST 12 MONTHS, THE FOOD YOU BOUGHT JUST DIDN'T LAST AND YOU DIDN'T HAVE MONEY TO GET MORE.: NEVER TRUE

## 2025-05-01 SDOH — SOCIAL STABILITY: SOCIAL INSECURITY: WITHIN THE LAST YEAR, HAVE YOU BEEN AFRAID OF YOUR PARTNER OR EX-PARTNER?: NO

## 2025-05-01 SDOH — ECONOMIC STABILITY: HOUSING INSECURITY: IN THE PAST 12 MONTHS, HOW MANY TIMES HAVE YOU MOVED WHERE YOU WERE LIVING?: 0

## 2025-05-01 SDOH — ECONOMIC STABILITY: HOUSING INSECURITY: AT ANY TIME IN THE PAST 12 MONTHS, WERE YOU HOMELESS OR LIVING IN A SHELTER (INCLUDING NOW)?: NO

## 2025-05-01 SDOH — SOCIAL STABILITY: SOCIAL INSECURITY: ABUSE: ADULT

## 2025-05-01 SDOH — SOCIAL STABILITY: SOCIAL INSECURITY
WITHIN THE LAST YEAR, HAVE YOU BEEN KICKED, HIT, SLAPPED, OR OTHERWISE PHYSICALLY HURT BY YOUR PARTNER OR EX-PARTNER?: NO

## 2025-05-01 SDOH — HEALTH STABILITY: MENTAL HEALTH: HOW OFTEN DO YOU HAVE A DRINK CONTAINING ALCOHOL?: PATIENT UNABLE TO ANSWER

## 2025-05-01 SDOH — SOCIAL STABILITY: SOCIAL INSECURITY: WERE YOU ABLE TO COMPLETE ALL THE BEHAVIORAL HEALTH SCREENINGS?: NO

## 2025-05-01 SDOH — SOCIAL STABILITY: SOCIAL INSECURITY: WITHIN THE LAST YEAR, HAVE YOU BEEN HUMILIATED OR EMOTIONALLY ABUSED IN OTHER WAYS BY YOUR PARTNER OR EX-PARTNER?: NO

## 2025-05-01 SDOH — SOCIAL STABILITY: SOCIAL INSECURITY: HAVE YOU HAD THOUGHTS OF HARMING ANYONE ELSE?: NO

## 2025-05-01 SDOH — ECONOMIC STABILITY: FOOD INSECURITY: HOW HARD IS IT FOR YOU TO PAY FOR THE VERY BASICS LIKE FOOD, HOUSING, MEDICAL CARE, AND HEATING?: NOT HARD AT ALL

## 2025-05-01 SDOH — SOCIAL STABILITY: SOCIAL INSECURITY: DOES ANYONE TRY TO KEEP YOU FROM HAVING/CONTACTING OTHER FRIENDS OR DOING THINGS OUTSIDE YOUR HOME?: NO

## 2025-05-01 SDOH — ECONOMIC STABILITY: HOUSING INSECURITY: IN THE LAST 12 MONTHS, WAS THERE A TIME WHEN YOU WERE NOT ABLE TO PAY THE MORTGAGE OR RENT ON TIME?: NO

## 2025-05-01 SDOH — HEALTH STABILITY: MENTAL HEALTH: CURRENT SMOKER: 0

## 2025-05-01 SDOH — SOCIAL STABILITY: SOCIAL INSECURITY: DO YOU FEEL UNSAFE GOING BACK TO THE PLACE WHERE YOU ARE LIVING?: NO

## 2025-05-01 SDOH — ECONOMIC STABILITY: TRANSPORTATION INSECURITY: IN THE PAST 12 MONTHS, HAS LACK OF TRANSPORTATION KEPT YOU FROM MEDICAL APPOINTMENTS OR FROM GETTING MEDICATIONS?: NO

## 2025-05-01 SDOH — SOCIAL STABILITY: SOCIAL INSECURITY: ARE THERE ANY APPARENT SIGNS OF INJURIES/BEHAVIORS THAT COULD BE RELATED TO ABUSE/NEGLECT?: NO

## 2025-05-01 SDOH — ECONOMIC STABILITY: INCOME INSECURITY: IN THE PAST 12 MONTHS HAS THE ELECTRIC, GAS, OIL, OR WATER COMPANY THREATENED TO SHUT OFF SERVICES IN YOUR HOME?: NO

## 2025-05-01 SDOH — SOCIAL STABILITY: SOCIAL INSECURITY: DO YOU FEEL ANYONE HAS EXPLOITED OR TAKEN ADVANTAGE OF YOU FINANCIALLY OR OF YOUR PERSONAL PROPERTY?: NO

## 2025-05-01 SDOH — SOCIAL STABILITY: SOCIAL INSECURITY: HAS ANYONE EVER THREATENED TO HURT YOUR FAMILY OR YOUR PETS?: NO

## 2025-05-01 ASSESSMENT — ACTIVITIES OF DAILY LIVING (ADL)
WALKS IN HOME: INDEPENDENT
HEARING - RIGHT EAR: FUNCTIONAL
JUDGMENT_ADEQUATE_SAFELY_COMPLETE_DAILY_ACTIVITIES: YES
PATIENT'S MEMORY ADEQUATE TO SAFELY COMPLETE DAILY ACTIVITIES?: YES
BATHING: INDEPENDENT
GROOMING: INDEPENDENT
TOILETING: INDEPENDENT
HEARING - LEFT EAR: FUNCTIONAL
DRESSING YOURSELF: INDEPENDENT
ADEQUATE_TO_COMPLETE_ADL: YES
FEEDING YOURSELF: INDEPENDENT
LACK_OF_TRANSPORTATION: NO
HEARING - RIGHT EAR: FUNCTIONAL
HEARING - LEFT EAR: FUNCTIONAL

## 2025-05-01 ASSESSMENT — PAIN SCALES - GENERAL
PAINLEVEL_OUTOF10: 0 - NO PAIN
PAIN_LEVEL: 2
PAINLEVEL_OUTOF10: 0 - NO PAIN

## 2025-05-01 ASSESSMENT — PATIENT HEALTH QUESTIONNAIRE - PHQ9
SUM OF ALL RESPONSES TO PHQ9 QUESTIONS 1 & 2: 0
1. LITTLE INTEREST OR PLEASURE IN DOING THINGS: NOT AT ALL
2. FEELING DOWN, DEPRESSED OR HOPELESS: NOT AT ALL

## 2025-05-01 ASSESSMENT — LIFESTYLE VARIABLES
SKIP TO QUESTIONS 9-10: 0
AUDIT-C TOTAL SCORE: -1

## 2025-05-01 ASSESSMENT — COGNITIVE AND FUNCTIONAL STATUS - GENERAL: PATIENT BASELINE BEDBOUND: UNABLE TO ASSESS AT THIS TIME

## 2025-05-01 ASSESSMENT — COLUMBIA-SUICIDE SEVERITY RATING SCALE - C-SSRS
1. IN THE PAST MONTH, HAVE YOU WISHED YOU WERE DEAD OR WISHED YOU COULD GO TO SLEEP AND NOT WAKE UP?: NO
2. HAVE YOU ACTUALLY HAD ANY THOUGHTS OF KILLING YOURSELF?: NO
6. HAVE YOU EVER DONE ANYTHING, STARTED TO DO ANYTHING, OR PREPARED TO DO ANYTHING TO END YOUR LIFE?: NO

## 2025-05-01 ASSESSMENT — PAIN - FUNCTIONAL ASSESSMENT: PAIN_FUNCTIONAL_ASSESSMENT: 0-10

## 2025-05-01 NOTE — ANESTHESIA POSTPROCEDURE EVALUATION
Patient: Michael Szymanski    Procedure Summary       Date: 05/01/25 Room / Location: [facility] OR 06 / [facility]    Anesthesia Start: 1309 Anesthesia Stop: 1616    Procedure: LAPAROSCOPIC RIGHT HEMICOLECTOMY Diagnosis:       Malignant neoplasm of ascending colon (Multi)      (Malignant neoplasm of ascending colon (Multi) [C18.2])    Surgeons: Sunny Dunn MD Responsible Provider: Antoni Zazueta MD    Anesthesia Type: general ASA Status: 3            Anesthesia Type: general    Vitals Value Taken Time   /55 05/01/25 16:56   Temp 36.5 °C (97.7 °F) 05/01/25 16:11   Pulse 55 05/01/25 16:56   Resp 17 05/01/25 16:56   SpO2 98 % 05/01/25 16:56       Anesthesia Post Evaluation    Patient location during evaluation: PACU  Patient participation: complete - patient participated  Level of consciousness: awake  Pain score: 2  Pain management: adequate  Multimodal analgesia pain management approach  Airway patency: patent  Two or more strategies used to mitigate risk of obstructive sleep apnea  Cardiovascular status: acceptable  Respiratory status: acceptable  Hydration status: acceptable  Postoperative Nausea and Vomiting: none        No notable events documented.

## 2025-05-01 NOTE — ANESTHESIA PREPROCEDURE EVALUATION
Patient: Michael Szymanski    Procedure Information       Date/Time: 25 1135    Procedures:       LAPAROSCOPIC RIGHT HEMICOLECTOMY      COLONOSCOPY    Location: [facility] OR 06 / [facility]    Surgeons: Sunny Dunn MD            Relevant Problems   GI   (+) Malignant neoplasm of ascending colon (Multi)      /Renal   (+) Prostate cancer (Multi)      Liver   (+) Malignant neoplasm of ascending colon (Multi)      Endocrine   (+) Diabetic renal disease (Multi)   (+) Hyperparathyroidism due to renal insufficiency (Multi)   (+) Type 2 diabetes mellitus with nephropathy (Multi)      Hematology   (+) Anemia   (+) Anemia of chronic renal failure      Musculoskeletal   (+) Chronic bilateral low back pain with left-sided sciatica   (+) Degenerative disc disease, lumbar       Clinical information reviewed:    Allergies    Med Hx             NPO Detail:  NPO/Void Status  Date of Last Liquid: 25  Date of Last Solid: 25         Physical Exam    Airway  Mallampati: III  TM distance: >3 FB  Neck ROM: full  Mouth openin finger widths     Cardiovascular - normal exam   Dental    Pulmonary - normal exam   Abdominal - normal exam           Anesthesia Plan    History of general anesthesia?: yes  History of complications of general anesthesia?: no    ASA 3     general     The patient is not a current smoker.    Anesthetic plan and risks discussed with patient.    Plan discussed with CRNA and attending.

## 2025-05-01 NOTE — OP NOTE
LAPAROSCOPIC RIGHT HEMICOLECTOMY     Operative Date: 05/01/25  Patient's Name: Michael Szymanski  Patient's YOB: 1945  Patient's MRN: [id number]  Patient's Age: 79 y.o.  Operating Room Location: [facility]  Patient's ASA: ASA status not filed in the log.  Patient's Estimated Blood Loss: 15 mL        PREOPERATIVE DIAGNOSIS:   Ascending colon cancer  Fat-containing umbilical hernia        POSTOPERATIVE DIAGNOSIS:   Ascending colon cancer  Fat-containing umbilical hernia        OPERATION/PROCEDURE:   Laparoscopic right hemicolectomy with ileocolic anastomosis  Primary umbilical hernia repair, 2 cm        SURGEON:   Sunny Dunn MD.         ASSISTANT:   Scrub assist.     No surgical resident was available to assist.  Hospital-employed assistant was available to assist.  The SA assisted with opening, exposure, dissection, and closure.           INDICATIONS:   This is a 79 y.o. male who presented with an ascending colon cancer and an approximately 2 cm umbilical hernia.  He had had a colonoscopy about 7 years ago with a polyp removed.  He followed up for another colonoscopy, and polyps were found in a similar position in the ascending colon.  These were removed, but unfortunately came back as moderately differentiated adenocarcinoma.  We therefore discussed a laparoscopic right hemicolectomy to be the most thorough with cure for the cancer.  We also noted a fat-containing umbilical hernia, with plans to repair it primarily.        OPERATION:   The reasons for, benefits to, and risks of surgery were discussed with the patient.  The risks included, but not limited to, bleeding, infection, persistent pain, injury to surrounding structures, anastomotic leak, and postoperative abscess.  The patient appeared to understand and consented for surgery.  He was therefore brought back to the operating room and placed on the operating room table in the supine position.  His abdomen was  prepped and draped in a standard fashion.       We started with a 12 mm incision just to the right and superior to the umbilicus.  We dissected down to the anterior sheath and opened it longitudinally.  We then split the rectus muscle, and opened the posterior sheath.  We placed our 12 mm Capps port intraperitoneally and insufflated.  We then performed a bilateral transversus abdominis plane block, instilling 15 mL of half percent Marcaine into each transversus abdominis plane under direct visualization.  We then placed our 5 mm ports.  We placed 1 suprapubically, 1 in the left lower quadrant, and 1 in the left upper quadrant.  We then placed the patient in Trendelenburg positioning with right side up.  We then    Elevated the cecum and found the peritoneal reflection over the ileocolic artery.  We traced this down towards its root, and opened up the peritoneum.  We then bluntly dissected out the vessels.  We then used a 5 mm clip to clip across the vessels on the staying side.  We then used our LigaSure to transect the colon side.  We then began dissecting out the right colon in the standard medial to lateral dissection.  We completely dissected out the cecum and ascending colon and then around to the mid transverse colon.  We were able to keep the retroperitoneum as well as the duodenum down and out of the way.  After dissecting out almost the entire right colon in this medial to lateral manner, we went back up to the transverse colon and  the omentum off of the transverse colon.  We then began dissecting out the last of the peritoneal attachments of the transverse colon working from the mid transverse colon around the hepatic flexure.  We then went back to the terminal ileum and dissected the remainder of the ascending colon attachments along the white line of Toldt.  We then went back to our mesentery and used our LigaSure to dissect through the transverse mesocolon up to our resection point on the  transverse colon.  We then did the same maneuver on the small bowel mesentery using the LigaSure to dissect through it up to our transection point on the terminal ileum.  We then used a blue load laparoscopic stapler to staple across the terminal ileum.    We then upsized our periumbilical 12 mm port.  We used a midline incision.  We then used a small Isiah wound protector to hold the wound open.  We then exteriorized the transverse colon.  We used a blue load stapler to staple across the transverse colon at our planned resection point.  This was at least 5 cm distal to the tumor.  We then brought up our new terminal ileum and used a blue load staples to make a stapled side-to-side functional end-to-end ileocolic anastomosis in the standard fashion.  We then checked our staple line, and then used a blue TA stapler to staple across the common channel.  We used 3-0 Vicryl sutures in the crotch as our crotch stitches.  We then oversewed our common channel staple line with a 3-0 Vicryl suture.  We then dunked this back inside.      We then inspected the umbilicus, and felt that we could easily close it primarily.  We were able to turn the abdominal wall inside out and bring the umbilicus up to our small opening in the abdomen.  We then used the electrocautery to dissect the peritoneum off of the fascial edge of the hernia defect.  We then used 2 separate figure-of-eight 0 Prolene sutures to close the fascia.  We then closed the fascia of our small midline incision with a running 0 PDS suture.  We then reinsufflated the abdomen and inspected our anastomosis.  It appeared healthy and well-vascularized.  We could see the mesentery of both the transverse colon and the ileum as broad-based without any obvious twisting.  We then removed our remaining 5 mm ports under direct visualization.  We irrigated out the periumbilical wound, and then closed the deeper tissues with 3-0 Vicryl sutures in layers.  We then closed the skin  of all of our incisions with subcuticular 4-0 Vicryl suture.  A Prineo dressing was applied over top.        DISPOSITION:   The patient tolerated the procedure well.  There were no immediate complications.  He will be brought to the postanesthesia care unit. From there, he will be admitted for ongoing care.        I was present and scrubbed in for the entire procedure except for closure of the epidermis.      CPT Code:  92229   67355      Operating Room Staff:  Anesthesiologist: Antoni Zazueta MD  CRNA: NORIS Garcia; NORIS Daniel  Circulator: Rosetta Kaufman RN  Relief Circulator: Pham Toribio RN; Graciela Eng RN  Relief Scrub: Pearl Palomo  Scrub Person: Leah Berardinelli Christopher Bohac, MD  General Surgery  Office: [phone]  Fax:     [phone]  4:03 PM  05/01/25

## 2025-05-01 NOTE — ANESTHESIA PROCEDURE NOTES
Airway  Date/Time: 5/1/2025 1:23 PM  Reason: elective    Airway not difficult    Staffing  Performed: CRNA   Authorized by: Antoni Zazueta MD    Performed by: NORIS Garcia  Patient location during procedure: OR    Patient Condition  Indications for airway management: anesthesia and airway protection  Patient position: sniffing  Planned trial extubation  Sedation level: deep     Final Airway Details   Preoxygenated: yes  Final airway type: endotracheal airway  Successful airway: ETT  Cuffed: yes   Successful intubation technique: video laryngoscopy  Endotracheal tube insertion site: oral  Blade size: #4  ETT size (mm): 7.5  Cormack-Lehane Classification: grade I - full view of glottis  Placement verified by: chest auscultation, capnometry and palpation of cuff   Measured from: lips  ETT to lips (cm): 22  Number of attempts at approach: 1    Additional Comments  Intubated with Cedillo. Lips and teeth in preinduction condition.

## 2025-05-02 ENCOUNTER — HOME HEALTH ADMISSION (OUTPATIENT)
Dept: HOME HEALTH SERVICES | Facility: HOME HEALTH | Age: 80
End: 2025-05-02
Payer: MEDICARE

## 2025-05-02 ENCOUNTER — APPOINTMENT (OUTPATIENT)
Dept: CARDIOLOGY | Facility: HOSPITAL | Age: 80
End: 2025-05-02
Payer: MEDICARE

## 2025-05-02 ENCOUNTER — APPOINTMENT (OUTPATIENT)
Dept: PHYSICAL THERAPY | Facility: CLINIC | Age: 80
End: 2025-05-02
Payer: MEDICARE

## 2025-05-02 ENCOUNTER — PHARMACY VISIT (OUTPATIENT)
Dept: PHARMACY | Facility: CLINIC | Age: 80
End: 2025-05-02
Payer: COMMERCIAL

## 2025-05-02 ENCOUNTER — DOCUMENTATION (OUTPATIENT)
Dept: HOME HEALTH SERVICES | Facility: HOME HEALTH | Age: 80
End: 2025-05-02
Payer: MEDICARE

## 2025-05-02 VITALS
SYSTOLIC BLOOD PRESSURE: 133 MMHG | RESPIRATION RATE: 18 BRPM | HEIGHT: 67 IN | HEART RATE: 53 BPM | WEIGHT: 228.62 LBS | OXYGEN SATURATION: 96 % | TEMPERATURE: 97.7 F | BODY MASS INDEX: 35.88 KG/M2 | DIASTOLIC BLOOD PRESSURE: 66 MMHG

## 2025-05-02 LAB
ALBUMIN SERPL BCP-MCNC: 3.2 G/DL (ref 3.4–5)
ANION GAP SERPL CALC-SCNC: 14 MMOL/L (ref 10–20)
ATRIAL RATE: 50 BPM
BUN SERPL-MCNC: 44 MG/DL (ref 6–23)
CALCIUM SERPL-MCNC: 7.9 MG/DL (ref 8.6–10.3)
CHLORIDE SERPL-SCNC: 104 MMOL/L (ref 98–107)
CO2 SERPL-SCNC: 21 MMOL/L (ref 21–32)
CREAT SERPL-MCNC: 3.11 MG/DL (ref 0.5–1.3)
EGFRCR SERPLBLD CKD-EPI 2021: 20 ML/MIN/1.73M*2
ERYTHROCYTE [DISTWIDTH] IN BLOOD BY AUTOMATED COUNT: 13.5 % (ref 11.5–14.5)
GLUCOSE BLD MANUAL STRIP-MCNC: 289 MG/DL (ref 74–99)
GLUCOSE BLD MANUAL STRIP-MCNC: 340 MG/DL (ref 74–99)
GLUCOSE SERPL-MCNC: 324 MG/DL (ref 74–99)
HCT VFR BLD AUTO: 36.9 % (ref 41–52)
HGB BLD-MCNC: 11.5 G/DL (ref 13.5–17.5)
MAGNESIUM SERPL-MCNC: 1.79 MG/DL (ref 1.6–2.4)
MCH RBC QN AUTO: 30.3 PG (ref 26–34)
MCHC RBC AUTO-ENTMCNC: 31.2 G/DL (ref 32–36)
MCV RBC AUTO: 97 FL (ref 80–100)
NRBC BLD-RTO: 0 /100 WBCS (ref 0–0)
P AXIS: 20 DEGREES
P OFFSET: 148 MS
P ONSET: 117 MS
PHOSPHATE SERPL-MCNC: 4.9 MG/DL (ref 2.5–4.9)
PLATELET # BLD AUTO: 196 X10*3/UL (ref 150–450)
POTASSIUM SERPL-SCNC: 4.9 MMOL/L (ref 3.5–5.3)
POTASSIUM SERPL-SCNC: 6.3 MMOL/L (ref 3.5–5.3)
PR INTERVAL: 198 MS
Q ONSET: 216 MS
QRS COUNT: 8 BEATS
QRS DURATION: 86 MS
QT INTERVAL: 432 MS
QTC CALCULATION(BAZETT): 393 MS
QTC FREDERICIA: 406 MS
R AXIS: -46 DEGREES
RBC # BLD AUTO: 3.79 X10*6/UL (ref 4.5–5.9)
SODIUM SERPL-SCNC: 133 MMOL/L (ref 136–145)
T AXIS: 52 DEGREES
T OFFSET: 432 MS
VENTRICULAR RATE: 50 BPM
WBC # BLD AUTO: 6.5 X10*3/UL (ref 4.4–11.3)

## 2025-05-02 PROCEDURE — 83735 ASSAY OF MAGNESIUM: CPT | Performed by: SURGERY

## 2025-05-02 PROCEDURE — 36415 COLL VENOUS BLD VENIPUNCTURE: CPT | Performed by: SURGERY

## 2025-05-02 PROCEDURE — 84132 ASSAY OF SERUM POTASSIUM: CPT | Performed by: PHYSICIAN ASSISTANT

## 2025-05-02 PROCEDURE — 93005 ELECTROCARDIOGRAM TRACING: CPT

## 2025-05-02 PROCEDURE — 2500000004 HC RX 250 GENERAL PHARMACY W/ HCPCS (ALT 636 FOR OP/ED): Mod: JZ | Performed by: PHYSICIAN ASSISTANT

## 2025-05-02 PROCEDURE — 2500000004 HC RX 250 GENERAL PHARMACY W/ HCPCS (ALT 636 FOR OP/ED): Performed by: SURGERY

## 2025-05-02 PROCEDURE — 36415 COLL VENOUS BLD VENIPUNCTURE: CPT | Performed by: PHYSICIAN ASSISTANT

## 2025-05-02 PROCEDURE — RXMED WILLOW AMBULATORY MEDICATION CHARGE

## 2025-05-02 PROCEDURE — 93010 ELECTROCARDIOGRAM REPORT: CPT | Performed by: INTERNAL MEDICINE

## 2025-05-02 PROCEDURE — 82947 ASSAY GLUCOSE BLOOD QUANT: CPT

## 2025-05-02 PROCEDURE — 2500000001 HC RX 250 WO HCPCS SELF ADMINISTERED DRUGS (ALT 637 FOR MEDICARE OP): Performed by: SURGERY

## 2025-05-02 PROCEDURE — 85027 COMPLETE CBC AUTOMATED: CPT | Performed by: SURGERY

## 2025-05-02 PROCEDURE — 2500000001 HC RX 250 WO HCPCS SELF ADMINISTERED DRUGS (ALT 637 FOR MEDICARE OP): Performed by: PHYSICIAN ASSISTANT

## 2025-05-02 PROCEDURE — 80069 RENAL FUNCTION PANEL: CPT | Performed by: SURGERY

## 2025-05-02 PROCEDURE — 2500000002 HC RX 250 W HCPCS SELF ADMINISTERED DRUGS (ALT 637 FOR MEDICARE OP, ALT 636 FOR OP/ED): Performed by: SURGERY

## 2025-05-02 RX ORDER — ONDANSETRON 4 MG/1
4 TABLET, FILM COATED ORAL EVERY 8 HOURS PRN
Qty: 20 TABLET | Refills: 0 | Status: SHIPPED | OUTPATIENT
Start: 2025-05-02

## 2025-05-02 RX ORDER — TORSEMIDE 20 MG/1
20 TABLET ORAL ONCE
Status: COMPLETED | OUTPATIENT
Start: 2025-05-02 | End: 2025-05-02

## 2025-05-02 RX ORDER — ACETAMINOPHEN 325 MG/1
650 TABLET ORAL EVERY 6 HOURS
Qty: 30 TABLET | Refills: 0 | Status: SHIPPED | OUTPATIENT
Start: 2025-05-02

## 2025-05-02 RX ORDER — OXYCODONE HYDROCHLORIDE 5 MG/1
5 TABLET ORAL EVERY 6 HOURS PRN
Qty: 15 TABLET | Refills: 0 | Status: SHIPPED | OUTPATIENT
Start: 2025-05-02

## 2025-05-02 RX ORDER — POLYETHYLENE GLYCOL 3350 17 G/17G
17 POWDER, FOR SOLUTION ORAL DAILY
Qty: 238 G | Refills: 0 | Status: SHIPPED | OUTPATIENT
Start: 2025-05-03

## 2025-05-02 RX ORDER — SODIUM CHLORIDE 9 MG/ML
75 INJECTION, SOLUTION INTRAVENOUS CONTINUOUS
Status: DISCONTINUED | OUTPATIENT
Start: 2025-05-02 | End: 2025-05-02 | Stop reason: HOSPADM

## 2025-05-02 RX ORDER — CYCLOBENZAPRINE HCL 5 MG
5 TABLET ORAL 3 TIMES DAILY PRN
Qty: 30 TABLET | Refills: 0 | Status: SHIPPED | OUTPATIENT
Start: 2025-05-02

## 2025-05-02 RX ORDER — GABAPENTIN 100 MG/1
100 CAPSULE ORAL 2 TIMES DAILY
Qty: 10 CAPSULE | Refills: 0 | Status: SHIPPED | OUTPATIENT
Start: 2025-05-02 | End: 2025-06-14 | Stop reason: ALTCHOICE

## 2025-05-02 RX ADMIN — CYCLOBENZAPRINE HYDROCHLORIDE 5 MG: 5 TABLET, FILM COATED ORAL at 08:27

## 2025-05-02 RX ADMIN — IBUPROFEN 600 MG: 600 TABLET ORAL at 11:57

## 2025-05-02 RX ADMIN — CYCLOBENZAPRINE HYDROCHLORIDE 5 MG: 5 TABLET, FILM COATED ORAL at 15:24

## 2025-05-02 RX ADMIN — ENOXAPARIN SODIUM 40 MG: 40 INJECTION SUBCUTANEOUS at 08:28

## 2025-05-02 RX ADMIN — METRONIDAZOLE 500 MG: 500 INJECTION, SOLUTION INTRAVENOUS at 12:35

## 2025-05-02 RX ADMIN — CEFAZOLIN SODIUM 2 G: 2 INJECTION, SOLUTION INTRAVENOUS at 04:20

## 2025-05-02 RX ADMIN — INSULIN LISPRO 6 UNITS: 100 INJECTION, SOLUTION INTRAVENOUS; SUBCUTANEOUS at 08:25

## 2025-05-02 RX ADMIN — POLYETHYLENE GLYCOL 3350 17 G: 17 POWDER, FOR SOLUTION ORAL at 08:27

## 2025-05-02 RX ADMIN — METOPROLOL SUCCINATE 50 MG: 50 TABLET, EXTENDED RELEASE ORAL at 08:27

## 2025-05-02 RX ADMIN — TORSEMIDE 20 MG: 20 TABLET ORAL at 10:32

## 2025-05-02 RX ADMIN — ACETAMINOPHEN 650 MG: 325 TABLET, FILM COATED ORAL at 11:57

## 2025-05-02 RX ADMIN — ACETAMINOPHEN 650 MG: 325 TABLET, FILM COATED ORAL at 05:59

## 2025-05-02 RX ADMIN — SODIUM CHLORIDE 75 ML/HR: 0.9 INJECTION, SOLUTION INTRAVENOUS at 10:32

## 2025-05-02 RX ADMIN — AMLODIPINE BESYLATE 10 MG: 10 TABLET ORAL at 08:27

## 2025-05-02 RX ADMIN — INSULIN LISPRO 8 UNITS: 100 INJECTION, SOLUTION INTRAVENOUS; SUBCUTANEOUS at 11:56

## 2025-05-02 RX ADMIN — ASPIRIN 81 MG: 81 TABLET, CHEWABLE ORAL at 08:28

## 2025-05-02 RX ADMIN — METRONIDAZOLE 500 MG: 500 INJECTION, SOLUTION INTRAVENOUS at 05:59

## 2025-05-02 RX ADMIN — IBUPROFEN 600 MG: 600 TABLET ORAL at 05:59

## 2025-05-02 RX ADMIN — CEFAZOLIN SODIUM 2 G: 2 INJECTION, SOLUTION INTRAVENOUS at 10:31

## 2025-05-02 ASSESSMENT — COGNITIVE AND FUNCTIONAL STATUS - GENERAL
DAILY ACTIVITIY SCORE: 22
STANDING UP FROM CHAIR USING ARMS: A LITTLE
WALKING IN HOSPITAL ROOM: A LITTLE
TOILETING: A LITTLE
CLIMB 3 TO 5 STEPS WITH RAILING: A LITTLE
STANDING UP FROM CHAIR USING ARMS: A LITTLE
MOVING TO AND FROM BED TO CHAIR: A LITTLE
MOBILITY SCORE: 19
MOBILITY SCORE: 19
HELP NEEDED FOR BATHING: A LITTLE
DAILY ACTIVITIY SCORE: 22
TOILETING: A LITTLE
CLIMB 3 TO 5 STEPS WITH RAILING: A LITTLE
MOVING TO AND FROM BED TO CHAIR: A LITTLE
TURNING FROM BACK TO SIDE WHILE IN FLAT BAD: A LITTLE
TURNING FROM BACK TO SIDE WHILE IN FLAT BAD: A LITTLE
HELP NEEDED FOR BATHING: A LITTLE
WALKING IN HOSPITAL ROOM: A LITTLE

## 2025-05-02 ASSESSMENT — PAIN SCALES - GENERAL
PAINLEVEL_OUTOF10: 0 - NO PAIN
PAINLEVEL_OUTOF10: 0 - NO PAIN

## 2025-05-02 ASSESSMENT — ACTIVITIES OF DAILY LIVING (ADL): LACK_OF_TRANSPORTATION: NO

## 2025-05-02 NOTE — PROGRESS NOTES
"General/Trauma Surgery Daily Progress Note    Subjective   No complaints this morning. Denies nausea, vomiting, increasing abdominal pain or distention. Only ordering full liquids so far, does not feel ready for soft foods.        Objective   Last Recorded Vitals:  Blood pressure 133/66, pulse 53, temperature 36.5 °C (97.7 °F), temperature source Temporal, resp. rate 18, height 1.702 m (5' 7\"), weight 104 kg (228 lb 9.9 oz), SpO2 96%.    Intake/Output last 3 Shifts:  I/O last 3 completed shifts:  In: 170 (1.6 mL/kg) [P.O.:120; I.V.:50 (0.5 mL/kg)]  Out: 550 (5.3 mL/kg) [Urine:550 (0.1 mL/kg/hr)]  Weight: 103.7 kg     Pain Score:  0-10 (Numeric) Pain Score: 0 - No pain     Physical Exam:  Constitutional: No acute distress, sitting up in bed.  Neuro: Alert, oriented. Follows commands.   Eyes: EOMI. No scleral icterus. Conjunctiva pink.  ENT: MMM.   Heart: Regular rate.  Respiratory: No increased work of breathing or audible wheeze.  Abdomen: Soft, nondistended. Appropriately tender. Incisions clean, dry, intact. Prineo.  MSK: Moves all extremities.  Vascular: Palpable pulses throughout, no pitting edema.  Skin: No rashes.   Psychological: Appropriate mood and behavior.            Relevant Results  Laboratory Results:  CBC:   Lab Results   Component Value Date    WBC 6.5 05/02/2025    RBC 3.79 (L) 05/02/2025    HGB 11.5 (L) 05/02/2025    HCT 36.9 (L) 05/02/2025     05/02/2025       RFP:   Lab Results   Component Value Date     (L) 05/02/2025    K 6.3 (HH) 05/02/2025     05/02/2025    CO2 21 05/02/2025    BUN 44 (H) 05/02/2025    CREATININE 3.11 (H) 05/02/2025    CALCIUM 7.9 (L) 05/02/2025    MG 1.79 05/02/2025    PHOS 4.9 05/02/2025        LFTs:   Lab Results   Component Value Date    ALBUMIN 3.2 (L) 05/02/2025         Imaging:  No results found.    Cardiology, Vascular, and Other Imaging  No other imaging results found for the past 2 days           Assessment/Plan   This is a 79 y.o. male who is " admitted post operative day #1 for care following a right laparoscopic hemicolectomy for colon adenocarcinoma. Hyperkalemia today, 6.3.       Plan:   -- EKG, one dose torsemide, NS 75ml/hr  -- Multimodal pain regimen as ordered  -- Ambulate  -- Soft diet  -- DVT chemoprophylaxis and SCDs  -- Protein supplementation, multivitamin             Seen and discussed with Dr. Dunn who is in agreement with plan. Please secure chat with questions.    Patti Valentin PA-C

## 2025-05-02 NOTE — DISCHARGE INSTRUCTIONS
PATIENT INSTRUCTIONS  RIGHT HEMICOLECTOMY    FOLLOW-UP: Please call the office after being discharged to make a follow up appointment in 2-3 weeks. Call the office if experiencing fevers greater than 101 degrees Fahrenheit, vomiting, increased abdominal pain, etc.     WOUND CARE INSTRUCTIONS: Incisions are closed with absorbable sutures and covered with glue. Glue will fall off in about 2 weeks. You are welcome to shower the day after surgery. Wash abdomen with warm, soapy water using your hand or gentle wash cloth. Pat dry. Do not submerge incisions in a bath tub or swimming pool for 2 weeks following surgery.    DIET: You may eat a soft, low residue diet for 2 weeks following surgery. We recommend following a bland, easily digestible diet for the first few days after surgery until your appetite improves. Take Miralax daily if experiencing constipation after surgery until stools are a pudding like consistency and easy to pass.     ACTIVITY:  You should not lift more than 10 pounds for 6 weeks after surgery as it could put you at increased risk for a post-operative hernia. We encourage frequent ambulation and getting out of bed as much as possible while you recover to improve your recovery process. Avoid strenuous activity for 6 weeks, which includes exercise that increases the heart rate, breathing rate, or makes you break a sweat.     MEDICATIONS: Plan to take Tylenol and Ibuprofen (if your physician approves) every 6 hours for the first week after surgery. Alternatively, you can alternate these two medications every three hours for the first week. You will be provided a short course of a narcotic medication to take for breakthrough pain as needed. You should not drive, make important decisions, or operate machinery when taking narcotic pain medication.    QUESTIONS: Please feel free to call your physician's office for any questions or concerns following surgery. Our office number is [phone].

## 2025-05-02 NOTE — DISCHARGE SUMMARY
Discharge Diagnosis  Malignant neoplasm of ascending colon (Multi)    Issues Requiring Follow-Up  Follow up 2 weeks    Test Results Pending At Discharge  Pending Labs       Order Current Status    Potassium In process    Surgical Pathology Exam In process            Hospital Course  Patient admitted for post operative care following a laparoscopic right hemicolectomy for ascending adenocarcinoma. He did have hyperkalemia POD1 at 6.3, he was started on normal saline, EKG obtained that had no acute arrhythmia and was given a dose of his home torsemide. Repeat Potassium was noted. Post operative course was uneventful otherwise. Patient was ambulatory, pain well controlled, tolerated up to a soft, carb controlled diet, voiding well and had no nausea. Patient did have bowel movement prior to discharge. Will discharge home with no drains and follow up in 2-3 weeks.       Pertinent Physical Exam At Time of Discharge  Physical Exam    Home Medications     Medication List      START taking these medications     cyclobenzaprine 5 mg tablet; Commonly known as: Flexeril; Take 1 tablet   (5 mg) by mouth 3 times a day as needed for muscle spasms.   gabapentin 100 mg capsule; Commonly known as: Neurontin; Take 1 capsule   (100 mg) by mouth 2 times a day for 5 days.   ondansetron 4 mg tablet; Commonly known as: Zofran; Take 1 tablet (4 mg)   by mouth every 8 hours if needed for nausea or vomiting.   oxyCODONE 5 mg immediate release tablet; Commonly known as: Roxicodone;   Take 1 tablet (5 mg) by mouth every 6 hours if needed for severe pain (7 -   10).   polyethylene glycol 17 gram packet; Commonly known as: Glycolax,   Miralax; Take 17 g by mouth once daily.; Start taking on: May 3, 2025     CHANGE how you take these medications     acetaminophen 325 mg tablet; Commonly known as: Tylenol; Take 2 tablets   (650 mg) by mouth every 6 hours.; What changed: how much to take, how to   take this, when to take this, reasons to take this    "  CONTINUE taking these medications     amLODIPine 10 mg tablet; Commonly known as: Norvasc; Take 1 tablet (10   mg) by mouth once every 24 hours.   aspirin 81 mg chewable tablet   calcitriol 0.5 mcg capsule; Commonly known as: Rocaltrol   Dexcom G7  misc; Generic drug: blood-glucose,,cont; For   continuous glucose monitoring.   Dexcom G7 Sensor device; Generic drug: blood-glucose sensor; For   continuous glucose monitoring.  Change every 10 days.   Fish OiL 1,200 (144-216) mg capsule; Generic drug: omega 3-dha-epa-fish   oil   FreeStyle Lite Strips; Generic drug: blood sugar diagnostic; For glucose   testing 3 times daily   HumaLOG U-100 Insulin 100 unit/mL injection; Generic drug: insulin   lispro; Inject 6-8 Units under the skin 3 times a day after meals. Take as   directed per insulin instructions.   Lantus U-100 Insulin 100 unit/mL injection; Generic drug: insulin   glargine; Inject 60 Units under the skin once daily at bedtime.   metoprolol succinate XL 50 mg 24 hr tablet; Commonly known as:   Toprol-XL; TAKE 1 TABLET BY MOUTH ONCE DAILY   pen needle, diabetic 32 gauge x 1/4\" needle; Commonly known as: Novofine   32; 3-4 times daily   pravastatin 20 mg tablet; Commonly known as: Pravachol; TAKE ONE TABLET   BY MOUTH ONCE EVERY 24 HOURS.   testosterone 50 mg/5 gram (1 %) gel; Commonly known as: Androgel; Place   1 packet (50 mg) on the skin once daily.   torsemide 20 mg tablet; Commonly known as: Demadex     STOP taking these medications     metroNIDAZOLE 500 mg tablet; Commonly known as: Flagyl   neomycin 500 mg tablet; Commonly known as: Mycifradin       Outpatient Follow-Up  Future Appointments   Date Time Provider Department Center   5/22/2025  2:45 PM Chantelle Clemens PT GEABYPT [facility]   6/12/2025  4:00 PM Robinson Ackerman DO [id number] [facility]   6/26/2025  4:20 PM Tye Brizuela MD [id number] [facility]       Patti Valentin PA-C  "

## 2025-05-02 NOTE — HH CARE COORDINATION
Home Care received a Referral for Nursing and Physical Therapy. We have processed the referral for a Start of Care on 5/4/2025.     If you have any questions or concerns, please feel free to contact us at [phone]. Follow the prompts, enter your five digit zip code, and you will be directed to your care team on [facility] 2.

## 2025-05-02 NOTE — PROGRESS NOTES
05/02/25 0801   Discharge Planning   Living Arrangements Alone   Support Systems Other (Comment)  (Patient is a  that lives above [facility]. Has a staff of 2 to assist)   Assistance Needed A&OX4; independent with ADLs with cane; drives; room air baseline and currently room air; PCP Dr Robinson Ackerman   Type of Residence Private residence   Number of Stairs to Enter Residence 12   Number of Stairs Within Residence 0   Do you have animals or pets at home? Yes   Type of Animals or Pets 2 cats (staff watching)   Who is requesting discharge planning? Provider   Expected Discharge Disposition Home  (DC dispo is pending PT JOHN Grider. Likely home no needs)   Does the patient need discharge transport arranged? No   Financial Resource Strain   How hard is it for you to pay for the very basics like food, housing, medical care, and heating? Not hard   Housing Stability   In the last 12 months, was there a time when you were not able to pay the mortgage or rent on time? N   In the past 12 months, how many times have you moved where you were living? 0   At any time in the past 12 months, were you homeless or living in a shelter (including now)? N   Transportation Needs   In the past 12 months, has lack of transportation kept you from medical appointments or from getting medications? no   In the past 12 months, has lack of transportation kept you from meetings, work, or from getting things needed for daily living? No        05/02/25 1300   Discharge Planning   Expected Discharge Disposition Home H  (Patient requesting new  Home Health for SN and PT. Message sent to team to request home care referral to be sent. Pt is a dc today and is arranging a ride home)

## 2025-05-05 NOTE — SIGNIFICANT EVENT
Follow Up Phone Call    Outgoing phone call    Spoke to: Michael Szymanski Relationship:self   Phone number: [phone]      Outcome: I left a message on answering machine   No chief complaint on file.         Diagnosis:Not applicable

## 2025-05-06 ENCOUNTER — HOME CARE VISIT (OUTPATIENT)
Dept: HOME HEALTH SERVICES | Facility: HOME HEALTH | Age: 80
End: 2025-05-06

## 2025-05-07 ENCOUNTER — APPOINTMENT (OUTPATIENT)
Dept: PHYSICAL THERAPY | Facility: CLINIC | Age: 80
End: 2025-05-07
Payer: MEDICARE

## 2025-05-08 ENCOUNTER — HOME CARE VISIT (OUTPATIENT)
Dept: HOME HEALTH SERVICES | Facility: HOME HEALTH | Age: 80
End: 2025-05-08
Payer: MEDICARE

## 2025-05-08 VITALS
WEIGHT: 234 LBS | SYSTOLIC BLOOD PRESSURE: 140 MMHG | OXYGEN SATURATION: 97 % | DIASTOLIC BLOOD PRESSURE: 80 MMHG | BODY MASS INDEX: 37.61 KG/M2 | RESPIRATION RATE: 16 BRPM | HEIGHT: 66 IN | TEMPERATURE: 97.2 F | HEART RATE: 68 BPM

## 2025-05-08 DIAGNOSIS — I10 HYPERTENSION, UNSPECIFIED TYPE: ICD-10-CM

## 2025-05-08 PROCEDURE — G0299 HHS/HOSPICE OF RN EA 15 MIN: HCPCS

## 2025-05-08 RX ORDER — METOPROLOL SUCCINATE 50 MG/1
50 TABLET, EXTENDED RELEASE ORAL DAILY
Qty: 180 TABLET | Refills: 1 | Status: SHIPPED | OUTPATIENT
Start: 2025-05-08

## 2025-05-08 ASSESSMENT — ACTIVITIES OF DAILY LIVING (ADL)
CURRENT_FUNCTION: STAND BY ASSIST
ENTERING_EXITING_HOME: STAND BY ASSIST
OASIS_M1830: 05
AMBULATION ASSISTANCE: STAND BY ASSIST

## 2025-05-08 ASSESSMENT — ENCOUNTER SYMPTOMS
LOWEST PAIN SEVERITY IN PAST 24 HOURS: 1/10
PAIN LOCATION - PAIN SEVERITY: 4/10
LAST BOWEL MOVEMENT: 67333
DIARRHEA: 1
LOWER EXTREMITY EDEMA: 1
PAIN SEVERITY GOAL: 0/10
FATIGUES EASILY: 1
PAIN LOCATION - RELIEVING FACTORS: PAIN MEDS
STOOL FREQUENCY: DAILY
CHANGE IN APPETITE: UNCHANGED
ABDOMINAL PAIN: 1
PERSON REPORTING PAIN: PATIENT
DYSPNEA ON EXERTION: 1
PAIN: 1
PAIN LOCATION - PAIN QUALITY: DULL
SUBJECTIVE PAIN PROGRESSION: UNCHANGED
HIGHEST PAIN SEVERITY IN PAST 24 HOURS: 4/10
PAIN LOCATION - PAIN FREQUENCY: INTERMITTENT
PAIN LOCATION: ABDOMEN
PAIN LOCATION - EXACERBATING FACTORS: MOVEMENT
APPETITE LEVEL: FAIR

## 2025-05-09 ENCOUNTER — APPOINTMENT (OUTPATIENT)
Dept: PHYSICAL THERAPY | Facility: CLINIC | Age: 80
End: 2025-05-09
Payer: MEDICARE

## 2025-05-09 NOTE — CASE COMMUNICATION
patient is a hoarder  food left out bugs ect , very dirty apt   patient reporting blood sugars around 90 when he goes to eat so he doesnt  take insulin then blood sugar is 300  he is on a low residue diet at present    Subjective:   Primary Reason for Home Care: post hospital assessment  Skilled Needs: comprehensiv nursing assessment medication instruction diabetic instruction home safety instruction   Precautions:   Instruction provide d:reviewed all meds schedule and purpose and potential side effects diabwetic instruction  instructed on incisional care and sigsn of infection  Patient and Caregiver response to instruction: verbalized understanding   Patient and Caregiver instructed on plan of care and visit frequency.   Patient and Caregiver in agreement with plan of care and visit frequency.    Discipline Communication: md pt  Plan for next visit:     Medication Rec onciliation:    Demonstrates Adherence : Yes  Issues/Concerns: Yes, describe:above  Provider Notified of Issues/Concerns: Yes  Caregiver Notified of Issues/Concerns: Yes  patient  response to instructions Verbalized Understanding  Pill bottles visualized during treatment Yes

## 2025-05-14 ENCOUNTER — HOME CARE VISIT (OUTPATIENT)
Dept: HOME HEALTH SERVICES | Facility: HOME HEALTH | Age: 80
End: 2025-05-14
Payer: MEDICARE

## 2025-05-14 ENCOUNTER — TELEPHONE (OUTPATIENT)
Dept: SURGERY | Facility: CLINIC | Age: 80
End: 2025-05-14
Payer: MEDICARE

## 2025-05-14 VITALS
SYSTOLIC BLOOD PRESSURE: 112 MMHG | OXYGEN SATURATION: 96 % | DIASTOLIC BLOOD PRESSURE: 61 MMHG | TEMPERATURE: 96.2 F | HEART RATE: 52 BPM | RESPIRATION RATE: 16 BRPM

## 2025-05-14 PROCEDURE — G0299 HHS/HOSPICE OF RN EA 15 MIN: HCPCS

## 2025-05-14 RX ORDER — METOPROLOL SUCCINATE 50 MG/1
25 TABLET, EXTENDED RELEASE ORAL DAILY
Start: 2025-05-14

## 2025-05-14 SDOH — ECONOMIC STABILITY: FOOD INSECURITY: MEALS PER DAY: 2

## 2025-05-14 ASSESSMENT — ACTIVITIES OF DAILY LIVING (ADL)
CURRENT_FUNCTION: STAND BY ASSIST
AMBULATION ASSISTANCE: STAND BY ASSIST

## 2025-05-14 ASSESSMENT — ENCOUNTER SYMPTOMS
DENIES PAIN: 1
DYSPNEA ON EXERTION: 1
SHORTNESS OF BREATH: 1
PAIN SEVERITY GOAL: 0/10
STOOL FREQUENCY: DAILY
CHANGE IN APPETITE: UNCHANGED
SUBJECTIVE PAIN PROGRESSION: RESOLVED
BOWEL PATTERN NORMAL: 1
DYSPNEA ACTIVITY LEVEL: AFTER AMBULATING 10 - 20 FT
APPETITE LEVEL: FAIR
PERSON REPORTING PAIN: PATIENT
LAST BOWEL MOVEMENT: 67338

## 2025-05-14 NOTE — CASE COMMUNICATION
Patient  hr 52  notified dr whitney who is cutting metoprolol dose in half  Subjective: Patient states that his sugar levels have ranged from  in the last 24 hours. Stated that he is following insulin instructions from doctor. He stated that he eats when his sugar level requires it, generally eats twice a day. Incisions have no pain, but just general irritation. The incision on the lower left side is bothering him the most as it is a t his waistband. It appears red. notified dr haro he is reviewing picture in media    Primary Reason for Home Care: post hospital stay, diabetes management wound assessment and management  Skilled Needs: comprehensive nursing assessment, diabetes management, medication instruction   Precautions: universal  Instruction provided:metoprolol dose cut in half  encouraged to take insulin with meal instead of waiting  2 hours   Patient respon se to instruction: verbalized understanding  Patient instructed on plan of care and visit frequency.   Patient in agreement with plan of care and visit frequency.    Discipline Communication: md  Plan for next visit:general assessment medication instruction diabetic instruction    Medication Reconciliation:    Demonstrates Adherence : yes  Issues/Concerns: Yes, describe:   Provider Notified of Issues/Concerns: Yes  Caregiver Notified of  Issues/Concerns: Yes  patient  response to instructions Verbalized Understanding  Pill bottles visualized during treatment Yes

## 2025-05-14 NOTE — TELEPHONE ENCOUNTER
Received message from home health care and reviewed pictures with Dr. Dunn--This looks great- the coverings on the incision sites all need to be pealed off carefully as these are able to be removed after 2 weeks. That is more than likely cause the skin irritation.     I have left a voice mail for the patient with these directions stated above.     Thank you, Shelia  Phone for office [phone]

## 2025-05-15 ENCOUNTER — HOME CARE VISIT (OUTPATIENT)
Dept: HOME HEALTH SERVICES | Facility: HOME HEALTH | Age: 80
End: 2025-05-15
Payer: MEDICARE

## 2025-05-15 VITALS
OXYGEN SATURATION: 97 % | HEART RATE: 60 BPM | DIASTOLIC BLOOD PRESSURE: 60 MMHG | RESPIRATION RATE: 18 BRPM | TEMPERATURE: 97.7 F | SYSTOLIC BLOOD PRESSURE: 126 MMHG

## 2025-05-15 PROCEDURE — G0151 HHCP-SERV OF PT,EA 15 MIN: HCPCS

## 2025-05-15 SDOH — HEALTH STABILITY: PHYSICAL HEALTH
EXERCISE COMMENTS: BILAT. LE EXER.S IN SITTING AND STANDING POS., 10 REPS X 1 SET EA EXER. WITH INSTR. FOR PROPER PACE AND FULL AVAILABLE ROM FOR EACH EXERCISE.

## 2025-05-15 ASSESSMENT — ENCOUNTER SYMPTOMS
PAIN SEVERITY GOAL: 2/10
PERSON REPORTING PAIN: PATIENT
HIGHEST PAIN SEVERITY IN PAST 24 HOURS: 3/10
LOWEST PAIN SEVERITY IN PAST 24 HOURS: 0/10
PAIN LOCATION: ABDOMEN
OCCASIONAL FEELINGS OF UNSTEADINESS: 0
MUSCLE WEAKNESS: 1
PAIN: 1
SUBJECTIVE PAIN PROGRESSION: GRADUALLY IMPROVING

## 2025-05-15 ASSESSMENT — ACTIVITIES OF DAILY LIVING (ADL)
AMBULATION ASSISTANCE: ONE PERSON
AMBULATION ASSISTANCE: 1
AMBULATION_DISTANCE/DURATION_TOLERATED: 100 FEET X 2
CURRENT_FUNCTION: ONE PERSON
AMBULATION ASSISTANCE: SUPERVISION
PHYSICAL TRANSFERS ASSESSED: 1
CURRENT_FUNCTION: SUPERVISION
AMBULATION ASSISTANCE ON FLAT SURFACES: 1

## 2025-05-16 NOTE — CASE COMMUNICATION
Michael has been having more difficulty with his blood sugars since surgery    he will have a lower blood sugar 60  to 90 then he doesnt take his humalog with his meal then his blood sugar shoots up to 300 . he is taking lantus 60 units at nite   he states he is eating 2 meals a day

## 2025-05-21 ENCOUNTER — HOME CARE VISIT (OUTPATIENT)
Dept: HOME HEALTH SERVICES | Facility: HOME HEALTH | Age: 80
End: 2025-05-21
Payer: MEDICARE

## 2025-05-21 ENCOUNTER — APPOINTMENT (OUTPATIENT)
Dept: SURGERY | Facility: CLINIC | Age: 80
End: 2025-05-21
Payer: MEDICARE

## 2025-05-21 VITALS
TEMPERATURE: 97.3 F | SYSTOLIC BLOOD PRESSURE: 140 MMHG | HEART RATE: 64 BPM | RESPIRATION RATE: 16 BRPM | OXYGEN SATURATION: 95 % | DIASTOLIC BLOOD PRESSURE: 60 MMHG

## 2025-05-21 DIAGNOSIS — C18.2 MALIGNANT NEOPLASM OF ASCENDING COLON (MULTI): Primary | ICD-10-CM

## 2025-05-21 PROCEDURE — G0299 HHS/HOSPICE OF RN EA 15 MIN: HCPCS

## 2025-05-21 PROCEDURE — 99024 POSTOP FOLLOW-UP VISIT: CPT | Performed by: SURGERY

## 2025-05-21 PROCEDURE — 1111F DSCHRG MED/CURRENT MED MERGE: CPT | Performed by: SURGERY

## 2025-05-21 SDOH — ECONOMIC STABILITY: FOOD INSECURITY: MEALS PER DAY: 2

## 2025-05-21 ASSESSMENT — ACTIVITIES OF DAILY LIVING (ADL)
AMBULATION ASSISTANCE: STAND BY ASSIST
CURRENT_FUNCTION: STAND BY ASSIST

## 2025-05-21 ASSESSMENT — ENCOUNTER SYMPTOMS
DENIES PAIN: 1
LAST BOWEL MOVEMENT: 67344
STOOL FREQUENCY: LESS THAN DAILY
CHANGE IN APPETITE: UNCHANGED
DYSPNEA ON EXERTION: 1
BOWEL PATTERN NORMAL: 1
APPETITE LEVEL: FAIR
PERSON REPORTING PAIN: PATIENT

## 2025-05-21 NOTE — PROGRESS NOTES
General Surgery Colectomy Follow-Up Note    Referring Provider:   Robinson Ackerman DO  No ref. provider found      Chief Complaint:  Follow up from colectomy      History of Present Illness:  This is a 79 y.o. male who presents for a laparoscopic right hemicolectomy.  He was last seen 3 weeks ago.  He has been doing well since surgery.  He has been tolerating a diet.  He reports normal bowel movements.  He denies any significant pain.  He denies any nausea or vomiting.      Vitals:   There were no vitals filed for this visit.      Physical Exam:  General: No acute distress. Sitting up in bed.   Neuro: Alert and oriented ×3. Follows commands.  Head: Atraumatic  Eyes: Pupils equal reactive to light. Extraocular motions intact.  Ears: Hears normal speaking voice.  Mouth, Nose, Throat: Mucous membranes moist.  Normal dentition.  Neck: Supple. No appreciable masses.  Chest: No crepitus.  No appreciable scars.  Heart: Regular rate and rhythm.  Lung: Clear to auscultation bilaterally.  Vascular: No carotid bruits.  Palpable radial pulses bilaterally.  Abdomen: Soft. Nondistended. Nontender.  Well-healed laparoscopic port sites.  Musculoskeletal: Moves all extremities.  Normal range of motion.  Lymphatic: No palpable lymph nodes.  Skin: No rashes or lesions.  Psychological: Normal affect    Labs:  CBC:   Lab Results   Component Value Date    WBC 6.5 05/02/2025    RBC 3.79 (L) 05/02/2025    HGB 11.5 (L) 05/02/2025    HCT 36.9 (L) 05/02/2025     05/02/2025       RFP:   Lab Results   Component Value Date     (L) 05/02/2025    K 4.9 05/02/2025     05/02/2025    CO2 21 05/02/2025    BUN 44 (H) 05/02/2025    BUN 55 (H) 04/08/2025    CREATININE 3.11 (H) 05/02/2025    CREATININE 2.84 (H) 04/08/2025    CALCIUM 7.9 (L) 05/02/2025    MG 1.79 05/02/2025    PHOS 4.9 05/02/2025        LFTs:   Lab Results   Component Value Date    PROT 6.9 04/17/2025    ALBUMIN 3.2 (L) 05/02/2025    BILITOT 0.5 04/17/2025    BILIDIR 0.1  11/28/2017    ALKPHOS 84 04/17/2025    AST 13 04/17/2025    ALT 13 04/17/2025            Imaging:   No new images.      Assessment:  This is a 79 y.o.-year-old male who presents for follow up of a laparoscopic right colectomy for colon cancer.  Overall he is doing very well.  We are still awaiting pathology to return.      Plan:  -- Advance diet to normal as tolerated.  -- OK to swim  -- Avoid lifting anything more than 10 pounds / exercising until at 6 weeks postoperative.  -- Repeat colonoscopy in 1 years.  -- Follow up with CEA and CT chest abdomen pelvis in 6 months.    Carlos Dunn MD  General Surgery  Office: [phone]  Fax: [phone]  3:30 PM  05/21/25        Past Medical History:  Medical History[1]     Past Surgical History:  Surgical History[2]     Medications:  Current Outpatient Medications   Medication Instructions    acetaminophen (TYLENOL) 650 mg, oral, Every 6 hours    amLODIPine (NORVASC) 10 mg, oral, Every 24 hours    aspirin 81 mg chewable tablet 1 tablet, Daily    calcitriol (Rocaltrol) 0.5 mcg capsule 1 capsule, oral, Every 24 hours    cyclobenzaprine (FLEXERIL) 5 mg, oral, 3 times daily PRN    Dexcom G4 platinum  (Dexcom G7 ) misc For continuous glucose monitoring.    Dexcom G7 Sensor device For continuous glucose monitoring.  Change every 10 days.    FreeStyle Lite Strips strip For glucose testing 3 times daily    gabapentin (NEURONTIN) 100 mg, oral, 2 times daily    HumaLOG U-100 Insulin 6-8 Units, subcutaneous, 3 times daily after meals, Take as directed per insulin instructions.    Lantus U-100 Insulin 60 Units, subcutaneous, Nightly    metoprolol succinate XL (TOPROL-XL) 25 mg, oral, Daily    omega 3-dha-epa-fish oil (Fish OiL) 1,200 (144-216) mg capsule Fish Oil 1200 MG Oral Capsule  Refills: 0  Start: 6-May-2021    omega 3-dha-epa-fish oil 360 mg-108 mg- 180 mg-1,200 mg capsule 1 capsule, oral, Daily    ondansetron (ZOFRAN) 4 mg, oral, Every 8 hours PRN     "oxyCODONE (ROXICODONE) 5 mg, oral, Every 6 hours PRN    pen needle, diabetic (Novofine 32) 32 gauge x 1/4\" needle 3-4 times daily    polyethylene glycol (Glycolax, Miralax) 17 gram/dose powder Take 17 g by mouth once daily.    pravastatin (Pravachol) 20 mg tablet TAKE ONE TABLET BY MOUTH ONCE EVERY 24 HOURS.    testosterone (ANDROGEL) 50 mg, transdermal, Daily    torsemide (Demadex) 20 mg tablet 1 tablet, Daily        Allergies:  RX Allergies[3]     Family History:  Family History[4]     Social History:  Social History[5]     Review of Systems:  A complete 12 point review of systems was performed and is negative except as noted in the history of present illness.          [1]   Past Medical History:  Diagnosis Date    Acute pansinusitis, unspecified 08/06/2019    Acute non-recurrent pansinusitis    Benign prostatic hyperplasia without lower urinary tract symptoms     BPH (benign prostatic hypertrophy)    Cellulitis of left lower limb 10/08/2021    Cellulitis of left lower extremity    Chronic kidney disease, stage 4 (severe) (Multi) 04/09/2025    Class 2 obesity with body mass index (BMI) of 37.0 to 37.9 in adult 02/27/2025    Encounter for immunization 03/21/2017    Encounter for administration of vaccine    Encounter for screening for colorectal cancer in high risk patient     Hyperlipidemia, unspecified 12/10/2015    Hypertensive chronic kidney disease with stage 1 through stage 4 chronic kidney disease, or unspecified chronic kidney disease 12/09/2021    Benign hypertension with CKD (chronic kidney disease) stage III    Klinefelter syndrome, unspecified ([facility])     History of Klinefelter syndrome    Localized edema 09/29/2021    Edema of extremity    Low back pain, unspecified 04/25/2018    Low back pain with radiation    Malignant neoplasm of ascending colon (Multi)     Morbid (severe) obesity due to excess calories (Multi) 05/14/2020    Obesity, Class III, BMI 40-49.9 (morbid obesity)    Morbid (severe) " obesity due to excess calories (Multi) 12/11/2021    Morbid obesity with BMI of 40.0-44.9, adult    Obesity, unspecified 08/07/2019    Obesity, Class II, BMI 35-39.9    Other abnormal glucose     Hemoglobin A1c greater than 9.0%    Other conditions influencing health status 03/12/2022    Uncontrolled diabetes mellitus with diabetic nephropathy    Other conditions influencing health status 03/12/2022    Uncontrolled diabetes mellitus with diabetic nephropathy    Other specified abnormal findings of blood chemistry 02/13/2020    Low vitamin D level    Other specified symptoms and signs involving the circulatory and respiratory systems 12/31/2018    Prolonged capillary refill time    Other symptoms and signs involving the musculoskeletal system 04/25/2018    Weakness of lower extremity    Other tear of medial meniscus, current injury, unspecified knee, initial encounter     MMT (medial meniscus tear)    Personal history of diseases of the skin and subcutaneous tissue 08/07/2019    History of partial thickness sunburn    Personal history of other diseases of male genital organs     History of prostatitis    Personal history of other diseases of the circulatory system     History of hypertension    Personal history of other diseases of the musculoskeletal system and connective tissue     History of osteoarthritis    Personal history of other diseases of the musculoskeletal system and connective tissue     History of bursitis    Personal history of other diseases of the nervous system and sense organs     History of cataract    Personal history of other diseases of the respiratory system 06/25/2018    History of acute bronchitis    Personal history of other diseases of the respiratory system     History of bronchitis    Personal history of other diseases of urinary system 04/19/2017    History of hematuria    Personal history of other endocrine, nutritional and metabolic disease     History of hyperlipidemia    Personal  history of other endocrine, nutritional and metabolic disease     History of obesity    Personal history of other endocrine, nutritional and metabolic disease     History of diabetic retinopathy    Personal history of other medical treatment     History of stress test    Personal history of other specified conditions 05/14/2020    History of edema    Personal history of other specified conditions 10/21/2021    History of fatigue    Personal history of other specified conditions 06/19/2013    History of elevated prostate specific antigen (PSA)    Personal history of urinary (tract) infections 01/13/2022    History of urinary tract infection    Preoperative clearance 04/09/2025    Dr. Gonzalez [facility], [facility]    Proteinuria, unspecified     Proteinuria    Spondylosis without myelopathy or radiculopathy, cervical region     Degenerative joint disease of cervical spine    Sprain of ribs, initial encounter 09/09/2021    Sprain of ribs    Sprain of ribs, initial encounter 09/09/2021    Sprain of costal cartilage, initial encounter    Strain of muscle, fascia and tendon of lower back, subsequent encounter 09/10/2019    Lumbosacral strain, subsequent encounter    Type 2 diabetes mellitus with mild nonproliferative diabetic retinopathy without macular edema, bilateral 12/09/2021    Type 2 diabetes mellitus with mild nonproliferative diabetic retinopathy without macular edema, bilateral    Varicose veins of unspecified lower extremity with ulcer of unspecified site (CODE) 10/08/2021    Venous stasis ulcer with varicose veins of lower extremity   [2]   Past Surgical History:  Procedure Laterality Date    CATARACT EXTRACTION  06/19/2013    Cataract Surgery    CHOLECYSTECTOMY  02/06/2017    Cholecystectomy    COLONOSCOPY  02/06/2017    Colonoscopy    COLONOSCOPY  02/27/2025    CYSTOSCOPY  02/06/2017    Diagnostic Cystoscopy    ESOPHAGOGASTRODUODENOSCOPY  02/06/2017    Esophagogastroduodenoscopy With Biopsy     GALLBLADDER SURGERY  06/19/2013    Gallbladder Surgery    KNEE SURGERY  02/06/2017    Knee Surgery Left    KNEE SURGERY  02/06/2017    Knee Surgery Right    PROSTATECTOMY  06/19/2013    Prostatectomy Perineal Radical    TONSILLECTOMY  05/29/2013    Tonsillectomy   [3]   Allergies  Allergen Reactions    Cortisone Unknown    Penicillins Rash and Unknown   [4]   Family History  Problem Relation Name Age of Onset    Pancreatic cancer Mother      Diabetes Mother      Heart disease Mother      Kidney disease Father      Hearing loss Brother      Other (elevated psa) Brother          serum    Other (cardiac disorder) Maternal Grandmother      Other (cardiac disorder) Maternal Grandfather      Other (cardiac disorder) Paternal Grandmother      Other (cardiac disorder) Paternal Grandfather     [5]   Social History  Socioeconomic History    Marital status: Single   Tobacco Use    Smoking status: Never     Passive exposure: Never    Smokeless tobacco: Never   Vaping Use    Vaping status: Never Used   Substance and Sexual Activity    Alcohol use: Never    Drug use: Never    Sexual activity: Defer     Social Drivers of Health     Financial Resource Strain: Low Risk  (5/2/2025)    Overall Financial Resource Strain (CARDIA)     Difficulty of Paying Living Expenses: Not hard at all   Food Insecurity: No Food Insecurity (5/1/2025)    Hunger Vital Sign     Worried About Running Out of Food in the Last Year: Never true     Ran Out of Food in the Last Year: Never true   Transportation Needs: No Transportation Needs (5/8/2025)    OASIS : Transportation     Lack of Transportation (Medical): No     Lack of Transportation (Non-Medical): No     Patient Unable or Declines to Respond: No   Social Connections: Feeling Somewhat Isolated (5/8/2025)    OASIS : Social Isolation     Frequency of experiencing loneliness or isolation: Sometimes   Intimate Partner Violence: Not At Risk (5/1/2025)    Humiliation, Afraid, Rape, and Kick  questionnaire     Fear of Current or Ex-Partner: No     Emotionally Abused: No     Physically Abused: No     Sexually Abused: No   Housing Stability: Low Risk  (5/2/2025)    Housing Stability Vital Sign     Unable to Pay for Housing in the Last Year: No     Number of Times Moved in the Last Year: 0     Homeless in the Last Year: No

## 2025-05-21 NOTE — CASE COMMUNICATION
Patient  Subjective: Patient presents with blood pressure of 140/60 but states that he has not taken his meds yet today. Stated that his highest blood sugar this week was 312  moslty ranging 150 to 200 no low blood sugars this week. Otherwise, incisions are healing and he seems to be progressing towards stated goals.    Primary Reason for Home Care: post hospital stay  Skilled Needs: comprehensive nursing assessment, wound care, diabete s management   Precautions: universal  Instruction provided: medication education, wound care,diabetic instruction  Patient response to instruction: verbalized understanding  Patient instructed on plan of care and visit frequency.   Patient in agreement with plan of care and visit frequency.    Discipline Communication: md   Plan for next visit: comprehensive nursing assessment, wound assessment, diabetes instruction    Medication Recon ciliation:    Demonstrates Adherence : Yes  Issues/Concerns: Yes, describe: see above  Provider Notified of Issues/Concerns: Yes  Caregiver Notified of Issues/Concerns: Yes  patient  response to instructions Verbalized Understanding  Pill bottles visualized during treatment Yes

## 2025-05-22 ENCOUNTER — HOME CARE VISIT (OUTPATIENT)
Dept: HOME HEALTH SERVICES | Facility: HOME HEALTH | Age: 80
End: 2025-05-22
Payer: MEDICARE

## 2025-05-22 ENCOUNTER — APPOINTMENT (OUTPATIENT)
Dept: PHYSICAL THERAPY | Facility: CLINIC | Age: 80
End: 2025-05-22
Payer: MEDICARE

## 2025-05-22 VITALS
OXYGEN SATURATION: 98 % | TEMPERATURE: 97.8 F | DIASTOLIC BLOOD PRESSURE: 60 MMHG | HEART RATE: 60 BPM | SYSTOLIC BLOOD PRESSURE: 122 MMHG | RESPIRATION RATE: 18 BRPM

## 2025-05-22 PROCEDURE — G0151 HHCP-SERV OF PT,EA 15 MIN: HCPCS

## 2025-05-22 ASSESSMENT — ACTIVITIES OF DAILY LIVING (ADL): ADLS_COMMENTS: PT. INDEP. AT THIS TIME.

## 2025-05-22 ASSESSMENT — ENCOUNTER SYMPTOMS
PAIN SEVERITY GOAL: 3/10
OCCASIONAL FEELINGS OF UNSTEADINESS: 0
PAIN LOCATION: ABDOMEN
LOWEST PAIN SEVERITY IN PAST 24 HOURS: 0/10
PAIN: 1
HIGHEST PAIN SEVERITY IN PAST 24 HOURS: 2/10
PERSON REPORTING PAIN: PATIENT
SUBJECTIVE PAIN PROGRESSION: GRADUALLY IMPROVING

## 2025-05-28 ENCOUNTER — HOME CARE VISIT (OUTPATIENT)
Dept: HOME HEALTH SERVICES | Facility: HOME HEALTH | Age: 80
End: 2025-05-28
Payer: MEDICARE

## 2025-05-28 VITALS
BODY MASS INDEX: 36.16 KG/M2 | DIASTOLIC BLOOD PRESSURE: 72 MMHG | WEIGHT: 225 LBS | TEMPERATURE: 97.3 F | HEIGHT: 66 IN | RESPIRATION RATE: 16 BRPM | HEART RATE: 60 BPM | OXYGEN SATURATION: 96 % | SYSTOLIC BLOOD PRESSURE: 130 MMHG

## 2025-05-28 PROCEDURE — G0299 HHS/HOSPICE OF RN EA 15 MIN: HCPCS

## 2025-05-28 ASSESSMENT — ENCOUNTER SYMPTOMS
PAIN: 1
PAIN LOCATION: BACK
PAIN LOCATION - RELIEVING FACTORS: REST
PERSON REPORTING PAIN: PATIENT
PAIN LOCATION - EXACERBATING FACTORS: MOVEMENT
HIGHEST PAIN SEVERITY IN PAST 24 HOURS: 1/10
PAIN LOCATION - PAIN SEVERITY: 1/10
PAIN SEVERITY GOAL: 0/10
SUBJECTIVE PAIN PROGRESSION: GRADUALLY IMPROVING
LOWEST PAIN SEVERITY IN PAST 24 HOURS: 0/10
PAIN LOCATION - PAIN QUALITY: DULL
PAIN LOCATION - PAIN FREQUENCY: INTERMITTENT

## 2025-05-28 ASSESSMENT — ACTIVITIES OF DAILY LIVING (ADL)
OASIS_M1830: 00
HOME_HEALTH_OASIS: 00

## 2025-05-28 NOTE — CASE COMMUNICATION
DISCHARGE SUMMARY:    DISCIPLINE: Nursing  DATE OF DISCIPLINE DISCHARGE: 5/28/2025  REASON FOR DISCHARGE: GOALS MET  COORDINATION NOTE: md  EVALUATION OF GOALS: goals met  SUMMARY OF CARE PROVIDED: comprehensive nursing assessment, wound care, diabetes assessment and instruction  DISCHARGE INSTRUCTIONS GIVEN: follow up with pcp  SERVICES REMAINING: none  NOMNC OBTAINED: yes

## 2025-05-29 ENCOUNTER — APPOINTMENT (OUTPATIENT)
Dept: PRIMARY CARE | Facility: CLINIC | Age: 80
End: 2025-05-29
Payer: MEDICARE

## 2025-05-29 VITALS
HEART RATE: 60 BPM | HEIGHT: 66 IN | WEIGHT: 227 LBS | OXYGEN SATURATION: 92 % | BODY MASS INDEX: 36.48 KG/M2 | DIASTOLIC BLOOD PRESSURE: 74 MMHG | SYSTOLIC BLOOD PRESSURE: 146 MMHG

## 2025-05-29 DIAGNOSIS — M54.42 CHRONIC BILATERAL LOW BACK PAIN WITH LEFT-SIDED SCIATICA: ICD-10-CM

## 2025-05-29 DIAGNOSIS — N18.4 CHRONIC KIDNEY DISEASE, STAGE 4 (SEVERE) (MULTI): ICD-10-CM

## 2025-05-29 DIAGNOSIS — G89.29 CHRONIC BILATERAL LOW BACK PAIN WITH LEFT-SIDED SCIATICA: ICD-10-CM

## 2025-05-29 DIAGNOSIS — Z79.4 TYPE 2 DIABETES MELLITUS WITH STAGE 4 CHRONIC KIDNEY DISEASE, WITH LONG-TERM CURRENT USE OF INSULIN (MULTI): ICD-10-CM

## 2025-05-29 DIAGNOSIS — E11.21 DIABETIC NEPHROPATHY ASSOCIATED WITH TYPE 2 DIABETES MELLITUS: ICD-10-CM

## 2025-05-29 DIAGNOSIS — C18.9 COLON ADENOCARCINOMA: ICD-10-CM

## 2025-05-29 DIAGNOSIS — E11.22 TYPE 2 DIABETES MELLITUS WITH STAGE 4 CHRONIC KIDNEY DISEASE, WITH LONG-TERM CURRENT USE OF INSULIN (MULTI): ICD-10-CM

## 2025-05-29 DIAGNOSIS — E11.319 DIABETIC RETINOPATHY ASSOCIATED WITH TYPE 2 DIABETES MELLITUS, MACULAR EDEMA PRESENCE UNSPECIFIED, UNSPECIFIED LATERALITY, UNSPECIFIED RETINOPATHY SEVERITY: ICD-10-CM

## 2025-05-29 DIAGNOSIS — Z00.00 ROUTINE GENERAL MEDICAL EXAMINATION AT HEALTH CARE FACILITY: Primary | ICD-10-CM

## 2025-05-29 DIAGNOSIS — N18.4 TYPE 2 DIABETES MELLITUS WITH STAGE 4 CHRONIC KIDNEY DISEASE, WITH LONG-TERM CURRENT USE OF INSULIN (MULTI): ICD-10-CM

## 2025-05-29 LAB
LABORATORY COMMENT REPORT: NORMAL
PATH REPORT.COMMENTS IMP SPEC: NORMAL
PATH REPORT.FINAL DX SPEC: NORMAL
PATH REPORT.GROSS SPEC: NORMAL
PATH REPORT.RELEVANT HX SPEC: NORMAL
PATH REPORT.TOTAL CANCER: NORMAL

## 2025-05-29 PROCEDURE — 99214 OFFICE O/P EST MOD 30 MIN: CPT | Performed by: INTERNAL MEDICINE

## 2025-05-29 PROCEDURE — 1159F MED LIST DOCD IN RCRD: CPT | Performed by: INTERNAL MEDICINE

## 2025-05-29 PROCEDURE — 1158F ADVNC CARE PLAN TLK DOCD: CPT | Performed by: INTERNAL MEDICINE

## 2025-05-29 PROCEDURE — 3077F SYST BP >= 140 MM HG: CPT | Performed by: INTERNAL MEDICINE

## 2025-05-29 PROCEDURE — 3078F DIAST BP <80 MM HG: CPT | Performed by: INTERNAL MEDICINE

## 2025-05-29 PROCEDURE — 1170F FXNL STATUS ASSESSED: CPT | Performed by: INTERNAL MEDICINE

## 2025-05-29 PROCEDURE — 1036F TOBACCO NON-USER: CPT | Performed by: INTERNAL MEDICINE

## 2025-05-29 PROCEDURE — 1111F DSCHRG MED/CURRENT MED MERGE: CPT | Performed by: INTERNAL MEDICINE

## 2025-05-29 PROCEDURE — 1123F ACP DISCUSS/DSCN MKR DOCD: CPT | Performed by: INTERNAL MEDICINE

## 2025-05-29 PROCEDURE — G0439 PPPS, SUBSEQ VISIT: HCPCS | Performed by: INTERNAL MEDICINE

## 2025-05-29 ASSESSMENT — ACTIVITIES OF DAILY LIVING (ADL)
TAKING_MEDICATION: INDEPENDENT
BATHING: INDEPENDENT
DRESSING: INDEPENDENT
DOING_HOUSEWORK: INDEPENDENT
MANAGING_FINANCES: INDEPENDENT
GROCERY_SHOPPING: INDEPENDENT

## 2025-05-29 ASSESSMENT — ENCOUNTER SYMPTOMS
DEPRESSION: 0
OCCASIONAL FEELINGS OF UNSTEADINESS: 1
LOSS OF SENSATION IN FEET: 0

## 2025-05-29 ASSESSMENT — PATIENT HEALTH QUESTIONNAIRE - PHQ9
SUM OF ALL RESPONSES TO PHQ9 QUESTIONS 1 AND 2: 0
1. LITTLE INTEREST OR PLEASURE IN DOING THINGS: NOT AT ALL
2. FEELING DOWN, DEPRESSED OR HOPELESS: NOT AT ALL

## 2025-05-29 ASSESSMENT — COLUMBIA-SUICIDE SEVERITY RATING SCALE - C-SSRS
6. HAVE YOU EVER DONE ANYTHING, STARTED TO DO ANYTHING, OR PREPARED TO DO ANYTHING TO END YOUR LIFE?: NO
2. HAVE YOU ACTUALLY HAD ANY THOUGHTS OF KILLING YOURSELF?: NO
1. IN THE PAST MONTH, HAVE YOU WISHED YOU WERE DEAD OR WISHED YOU COULD GO TO SLEEP AND NOT WAKE UP?: NO

## 2025-05-29 NOTE — PROGRESS NOTES
"Subjective   Michael Szymanski is a 79 y.o. male with a PMH of HTN, HLD, insulin dependent T2DM, CKD stage 4, Klinefelter's, prostate cancer, and colon cancer s/p laparoscopic right hemicolectomy on 5/1 who is here for a post hospital follow up visit after his recent procedure. Patient was also found to be hyperkalemic prior to procedure, however this resolved. He later followed up with surgery on 5/21, happy with his progress, recommended repeat colonoscopy in 1 year and CEA/CT chest/abdomen/pelvis in 6 months    Currently is asymptomatic and feeling fine. Tolerating diet well, no abdominal pains. No nausea and vomiting, bowel movements are regular. No blood in stools.    Chief Complaint:  Hospital Follow-up and Medicare Annual Wellness Visit Subsequent    Review of Systems   All other systems reviewed and are negative.      Objective   Vitals reviewed.   Constitutional:       Appearance: Not in distress.   Cardiovascular:      Normal rate. Regular rhythm.      Murmurs: There is no murmur.   Abdominal:      General: There is no distension.      Palpations: Abdomen is soft.      Tenderness: There is no abdominal tenderness.   Neurological:      General: No focal deficit present.      Mental Status: Alert and oriented to person, place and time.         Lab Review:   Lab Results   Component Value Date     (L) 05/02/2025    K 4.9 05/02/2025     05/02/2025    CO2 21 05/02/2025    BUN 44 (H) 05/02/2025    BUN 55 (H) 04/08/2025    CREATININE 3.11 (H) 05/02/2025    CREATININE 2.84 (H) 04/08/2025    GLUCOSE 324 (H) 05/02/2025    CALCIUM 7.9 (L) 05/02/2025     No results found for: \"CKTOTAL\", \"CKMB\", \"CKMBINDEX\", \"TROPONINI\"  Lab Results   Component Value Date    WBC 6.5 05/02/2025    HGB 11.5 (L) 05/02/2025    HCT 36.9 (L) 05/02/2025    MCV 97 05/02/2025     05/02/2025     Lab Results   Component Value Date    CHOL 88 02/26/2024    TRIG 126 02/26/2024    HDL 34.4 02/26/2024       Assessment/Plan "   #Adenocarcinoma of colon  - S/p laparoscopic right hemicolectomy on 5/1  - Biopsies taken from that procedure were negative for malignancy or other acute pathology  - Per general surgery recs, patient to undergo colonoscopy in 1 year, and to undergo a CEA check and repeat CT chest/abdomen/pelvis in 6 months post-op  - No heavy lifting until June 12 (which is 6 weeks post-op)    Follow up in clinic on 6/12    Tamela Omer MD  PGY-2 IM resident

## 2025-05-29 NOTE — LETTER
May 29, 2025     Patient: Michael Szymanski   YOB: 1945   Date of Visit: 5/29/2025       To Whom It May Concern:    Michael Szymanski was seen in my clinic on 5/29/2025 at 2:15 pm. Patient able to return to PT on June 16, 2025 with no restrictions.     If you have any questions or concerns, please don't hesitate to call.         Sincerely,         Robinson Ackerman,         CC: No Recipients

## 2025-06-08 LAB
ATRIAL RATE: 50 BPM
ATRIAL RATE: 55 BPM
P AXIS: 20 DEGREES
P AXIS: 33 DEGREES
P OFFSET: 148 MS
P OFFSET: 170 MS
P ONSET: 109 MS
P ONSET: 117 MS
PR INTERVAL: 198 MS
PR INTERVAL: 208 MS
Q ONSET: 213 MS
Q ONSET: 216 MS
QRS COUNT: 8 BEATS
QRS COUNT: 9 BEATS
QRS DURATION: 100 MS
QRS DURATION: 86 MS
QT INTERVAL: 398 MS
QT INTERVAL: 432 MS
QTC CALCULATION(BAZETT): 380 MS
QTC CALCULATION(BAZETT): 393 MS
QTC FREDERICIA: 386 MS
QTC FREDERICIA: 406 MS
R AXIS: -46 DEGREES
R AXIS: -49 DEGREES
T AXIS: 48 DEGREES
T AXIS: 52 DEGREES
T OFFSET: 412 MS
T OFFSET: 432 MS
VENTRICULAR RATE: 50 BPM
VENTRICULAR RATE: 55 BPM

## 2025-06-12 ENCOUNTER — APPOINTMENT (OUTPATIENT)
Dept: PRIMARY CARE | Facility: CLINIC | Age: 80
End: 2025-06-12
Payer: MEDICARE

## 2025-06-12 VITALS
HEIGHT: 66 IN | SYSTOLIC BLOOD PRESSURE: 152 MMHG | WEIGHT: 226 LBS | HEART RATE: 60 BPM | BODY MASS INDEX: 36.32 KG/M2 | DIASTOLIC BLOOD PRESSURE: 78 MMHG | OXYGEN SATURATION: 91 %

## 2025-06-12 DIAGNOSIS — E78.00 ELEVATED LDL CHOLESTEROL LEVEL: ICD-10-CM

## 2025-06-12 DIAGNOSIS — Z79.4 TYPE 2 DIABETES MELLITUS WITH STAGE 4 CHRONIC KIDNEY DISEASE, WITH LONG-TERM CURRENT USE OF INSULIN (MULTI): ICD-10-CM

## 2025-06-12 DIAGNOSIS — N18.4 TYPE 2 DIABETES MELLITUS WITH STAGE 4 CHRONIC KIDNEY DISEASE, WITH LONG-TERM CURRENT USE OF INSULIN (MULTI): ICD-10-CM

## 2025-06-12 DIAGNOSIS — I10 HYPERTENSION, UNSPECIFIED TYPE: ICD-10-CM

## 2025-06-12 DIAGNOSIS — E11.22 TYPE 2 DIABETES MELLITUS WITH STAGE 4 CHRONIC KIDNEY DISEASE, WITH LONG-TERM CURRENT USE OF INSULIN (MULTI): ICD-10-CM

## 2025-06-12 DIAGNOSIS — Q98.4 KLINEFELTER'S SYNDROME (HHS-HCC): Primary | ICD-10-CM

## 2025-06-12 PROCEDURE — G2211 COMPLEX E/M VISIT ADD ON: HCPCS | Performed by: INTERNAL MEDICINE

## 2025-06-12 PROCEDURE — 99214 OFFICE O/P EST MOD 30 MIN: CPT | Performed by: INTERNAL MEDICINE

## 2025-06-12 PROCEDURE — 1159F MED LIST DOCD IN RCRD: CPT | Performed by: INTERNAL MEDICINE

## 2025-06-12 PROCEDURE — 3077F SYST BP >= 140 MM HG: CPT | Performed by: INTERNAL MEDICINE

## 2025-06-12 PROCEDURE — 3078F DIAST BP <80 MM HG: CPT | Performed by: INTERNAL MEDICINE

## 2025-06-12 RX ORDER — METOPROLOL SUCCINATE 25 MG/1
25 TABLET, EXTENDED RELEASE ORAL DAILY
Qty: 90 TABLET | Refills: 1 | Status: SHIPPED | OUTPATIENT
Start: 2025-06-12

## 2025-06-12 NOTE — PROGRESS NOTES
"Subjective   Patient ID: Michael Szymanski is a 79 y.o. male who presents for Follow-up.    HPI     Overall doing well  No specific concerns   No CP, SOB, DRISCOLL or LE edema       Review of Systems   All other systems reviewed and are negative.      Objective   /78   Pulse 60   Ht 1.676 m (5' 6\")   Wt 103 kg (226 lb)   SpO2 91%   BMI 36.48 kg/m²     Physical Exam  Constitutional:       Appearance: Normal appearance.   HENT:      Head: Normocephalic and atraumatic.   Cardiovascular:      Rate and Rhythm: Normal rate and regular rhythm.      Heart sounds: Normal heart sounds. No murmur heard.     No gallop.   Pulmonary:      Effort: Pulmonary effort is normal. No respiratory distress.      Breath sounds: No wheezing or rales.   Skin:     General: Skin is warm and dry.      Findings: No rash.   Neurological:      Mental Status: He is alert and oriented to person, place, and time. Mental status is at baseline.   Psychiatric:         Mood and Affect: Mood normal.         Behavior: Behavior normal.         Assessment/Plan       #Colon ca  -s/p right hemicolectomy. Following with Dr. Dunn     #HTN  -Well controlled. Today . Cont amlodipine, metoprolol and torsemide  -No ACE/ARB due to borderline hyperkalemia     #HLD  -Cont pravastatin     #DM  -Follows with Dr. Brizuela. Cont Lantus and SSI      #CKD stage 4  -Follows with Dr. Gonzalez. No ACE/ARB due to borderline hyperkalemia  -Cont calcitriol     #Klinefelter's syndrome, low testosterone, h/o prostate ca   -Follows with urology. cont testosterone supplementation   -Check test level and PSA      Colonoscopy: colon ca  PSA: 11/2024     RTC 6 mo       "

## 2025-06-13 LAB
CHOLEST SERPL-MCNC: 156 MG/DL
CHOLEST/HDLC SERPL: 3.6 (CALC)
HDLC SERPL-MCNC: 43 MG/DL
LDLC SERPL CALC-MCNC: 87 MG/DL (CALC)
NONHDLC SERPL-MCNC: 113 MG/DL (CALC)
PSA SERPL-MCNC: 0.04 NG/ML
TESTOST SERPL-MCNC: 30 NG/DL (ref 250–827)
TRIGL SERPL-MCNC: 162 MG/DL

## 2025-06-17 ENCOUNTER — TELEPHONE (OUTPATIENT)
Dept: PRIMARY CARE | Facility: CLINIC | Age: 80
End: 2025-06-17
Payer: MEDICARE

## 2025-06-17 NOTE — PROGRESS NOTES
Subjective   Patient ID: Michael Szymanski is a 79 y.o. male    HPI  Today's visit was done virtually after appropriate consent from the patient.    79 y.o. male who presents for a follow-up visit with prostate cancer and low testosterone. Patient has a case of testosterone deficiency on testosterone placement therapy in the form of testosterone 50 mg per 5 g 1% transdermal gel 2 packet daily. Most recent labs in September 2023 PSA <0.10, testosterone 1036, hematocrit 42%. Patient has history of prostate cancer status post radical prostatectomy 15 years prior to presentation while PSA was less than 0.1. As per patient margins were negative. Extent to him the TRT might put him at high risk of disease recurrence.    Today, he reports not having any refills left of TRT gel prior to his most recent T level.     The most recent PSA, conducted on 06/13/2025, revealed:  0.04 ng/mL    The most recent Testosterone, conducted on 06/13/2025, revealed:  30 ng/dL Low       Review of Systems    All systems were reviewed. Anything negative was noted in the HPI.    Objective   Physical Exam  Constitutional:       Appearance: Normal appearance.   HENT:      Head: Normocephalic and atraumatic.      Nose: Nose normal.   Pulmonary:      Effort: No respiratory distress.   Abdominal:      General: There is no distension.   Neurological:      Mental Status: He is alert.               Medical History[1]      Surgical History[2]      Assessment/Plan   Low testosterone    79 y.o. male who presents for the above condition, We had a long and extensive discussion with the patient regarding hypogonadism I explained to him that since the patient had to low level below the reference range of testosterone that he might have hypogonadism. We had an extensive explanation about the pathophysiology, risk factor, differential diagnosis freezing and management of hypogonadism. We discussed testosterone replacement therapy I explained at length to the  patient the risks of testosterone replacement therapy. We discussed the correlation of testosterone replacement therapy with prostate cancer explained to him that he might be at high risk of detecting prostate cancer and this is need to follow-up closely his PSA and always make sure that is not increasing or having a high doubling time. Patient does not have any family history of prostate cancer. We also discussed cardiac events with TRT including MRIs, heart failure, PEs. Discussed with the patient that many studies so far reporting a small high risk of cardiac events with TRT. We discussed the black box warning of TRT. Also discussed the risks of polycythemia and the increased risk for stroke in case his hematocrit was above 52. Patient verbalized understanding of all the risk and benefit of this on physical therapy and he wants to proceed. We discussed the different formulation including testosterone gel, testosterone transdermal patches, IM injection and oral testosterone. Informed him that oral testosterone is a relatively new medication on the market. We discussed Jatenzo as an oral formulation of testosterone. Explained all the risk benefit potential complication of the medication and the black box warning of hypertension. However I explained to the patient that his result of total testosterone is low normal and not deficient. Explained to him that he needs to repeat his total testosterone level and observe the trend of the value. If the value is still trending downwards below the reference range 10 he will be considered for TRT treatment. However if not the risk might outweigh the benefit.     Plan:  - Follow-up in 4 months with:      - PSA      - H&H      - Testosterone free and total (advised pt to complete work-up early in the morning 1 week before next appointment)      E&M visit today is associated with current or anticipated ongoing medical care services related to a patient's single, serious condition  or a complex condition.     6/18/2025    Scribe Attestation  By signing my name below, I, William Duckworth attest that this documentation has been prepared under the direction and in the presence of Dr. Jorge Vidal.          [1]   Past Medical History:  Diagnosis Date    Acute pansinusitis, unspecified 08/06/2019    Acute non-recurrent pansinusitis    Benign prostatic hyperplasia without lower urinary tract symptoms     BPH (benign prostatic hypertrophy)    Cellulitis of left lower limb 10/08/2021    Cellulitis of left lower extremity    Chronic kidney disease, stage 4 (severe) (Multi) 04/09/2025    Class 2 obesity with body mass index (BMI) of 37.0 to 37.9 in adult 02/27/2025    Encounter for immunization 03/21/2017    Encounter for administration of vaccine    Encounter for screening for colorectal cancer in high risk patient     Hyperlipidemia, unspecified 12/10/2015    Hypertensive chronic kidney disease with stage 1 through stage 4 chronic kidney disease, or unspecified chronic kidney disease 12/09/2021    Benign hypertension with CKD (chronic kidney disease) stage III    Klinefelter syndrome, unspecified ([facility])     History of Klinefelter syndrome    Localized edema 09/29/2021    Edema of extremity    Low back pain, unspecified 04/25/2018    Low back pain with radiation    Malignant neoplasm of ascending colon (Multi)     Morbid (severe) obesity due to excess calories (Multi) 05/14/2020    Obesity, Class III, BMI 40-49.9 (morbid obesity)    Morbid (severe) obesity due to excess calories (Multi) 12/11/2021    Morbid obesity with BMI of 40.0-44.9, adult    Obesity, unspecified 08/07/2019    Obesity, Class II, BMI 35-39.9    Other abnormal glucose     Hemoglobin A1c greater than 9.0%    Other conditions influencing health status 03/12/2022    Uncontrolled diabetes mellitus with diabetic nephropathy    Other conditions influencing health status 03/12/2022    Uncontrolled diabetes mellitus with  diabetic nephropathy    Other specified abnormal findings of blood chemistry 02/13/2020    Low vitamin D level    Other specified symptoms and signs involving the circulatory and respiratory systems 12/31/2018    Prolonged capillary refill time    Other symptoms and signs involving the musculoskeletal system 04/25/2018    Weakness of lower extremity    Other tear of medial meniscus, current injury, unspecified knee, initial encounter     MMT (medial meniscus tear)    Personal history of diseases of the skin and subcutaneous tissue 08/07/2019    History of partial thickness sunburn    Personal history of other diseases of male genital organs     History of prostatitis    Personal history of other diseases of the circulatory system     History of hypertension    Personal history of other diseases of the musculoskeletal system and connective tissue     History of osteoarthritis    Personal history of other diseases of the musculoskeletal system and connective tissue     History of bursitis    Personal history of other diseases of the nervous system and sense organs     History of cataract    Personal history of other diseases of the respiratory system 06/25/2018    History of acute bronchitis    Personal history of other diseases of the respiratory system     History of bronchitis    Personal history of other diseases of urinary system 04/19/2017    History of hematuria    Personal history of other endocrine, nutritional and metabolic disease     History of hyperlipidemia    Personal history of other endocrine, nutritional and metabolic disease     History of obesity    Personal history of other endocrine, nutritional and metabolic disease     History of diabetic retinopathy    Personal history of other medical treatment     History of stress test    Personal history of other specified conditions 05/14/2020    History of edema    Personal history of other specified conditions 10/21/2021    History of fatigue     Personal history of other specified conditions 06/19/2013    History of elevated prostate specific antigen (PSA)    Personal history of urinary (tract) infections 01/13/2022    History of urinary tract infection    Preoperative clearance 04/09/2025    Dr. [facility], [facility]    Proteinuria, unspecified     Proteinuria    Spondylosis without myelopathy or radiculopathy, cervical region     Degenerative joint disease of cervical spine    Sprain of ribs, initial encounter 09/09/2021    Sprain of ribs    Sprain of ribs, initial encounter 09/09/2021    Sprain of costal cartilage, initial encounter    Strain of muscle, fascia and tendon of lower back, subsequent encounter 09/10/2019    Lumbosacral strain, subsequent encounter    Type 2 diabetes mellitus with mild nonproliferative diabetic retinopathy without macular edema, bilateral 12/09/2021    Type 2 diabetes mellitus with mild nonproliferative diabetic retinopathy without macular edema, bilateral    Varicose veins of unspecified lower extremity with ulcer of unspecified site (CODE) 10/08/2021    Venous stasis ulcer with varicose veins of lower extremity   [2]   Past Surgical History:  Procedure Laterality Date    CATARACT EXTRACTION  06/19/2013    Cataract Surgery    CHOLECYSTECTOMY  02/06/2017    Cholecystectomy    COLONOSCOPY  02/06/2017    Colonoscopy    COLONOSCOPY  02/27/2025    CYSTOSCOPY  02/06/2017    Diagnostic Cystoscopy    ESOPHAGOGASTRODUODENOSCOPY  02/06/2017    Esophagogastroduodenoscopy With Biopsy    GALLBLADDER SURGERY  06/19/2013    Gallbladder Surgery    KNEE SURGERY  02/06/2017    Knee Surgery Left    KNEE SURGERY  02/06/2017    Knee Surgery Right    PROSTATECTOMY  06/19/2013    Prostatectomy Perineal Radical    TONSILLECTOMY  05/29/2013    Tonsillectomy

## 2025-06-17 NOTE — TELEPHONE ENCOUNTER
----- Message from Robinson Ackerman sent at 6/14/2025  1:08 PM EDT -----  Call pt,     Cholestgerol is fine . He will see Dr. Vidal for his test level and PSA   ----- Message -----  From: Hilda [facility] Results In  Sent: 6/13/2025  10:36 PM EDT  To: Robinson Ackerman, DO

## 2025-06-18 ENCOUNTER — APPOINTMENT (OUTPATIENT)
Dept: UROLOGY | Facility: HOSPITAL | Age: 80
End: 2025-06-18
Payer: MEDICARE

## 2025-06-18 DIAGNOSIS — E29.1 HYPOGONADISM IN MALE: Primary | ICD-10-CM

## 2025-06-18 DIAGNOSIS — N42.9 DISORDER OF PROSTATE, UNSPECIFIED: ICD-10-CM

## 2025-06-18 PROCEDURE — 99214 OFFICE O/P EST MOD 30 MIN: CPT | Performed by: STUDENT IN AN ORGANIZED HEALTH CARE EDUCATION/TRAINING PROGRAM

## 2025-06-18 PROCEDURE — G2211 COMPLEX E/M VISIT ADD ON: HCPCS | Performed by: STUDENT IN AN ORGANIZED HEALTH CARE EDUCATION/TRAINING PROGRAM

## 2025-06-18 RX ORDER — TESTOSTERONE GEL, 1% 10 MG/G
5000 GEL TRANSDERMAL DAILY
Qty: 30 PACKET | Refills: 5 | Status: SHIPPED | OUTPATIENT
Start: 2025-06-18 | End: 2025-07-18

## 2025-06-26 ENCOUNTER — APPOINTMENT (OUTPATIENT)
Dept: ENDOCRINOLOGY | Facility: CLINIC | Age: 80
End: 2025-06-26
Payer: MEDICARE

## 2025-06-26 VITALS — HEART RATE: 54 BPM | DIASTOLIC BLOOD PRESSURE: 74 MMHG | SYSTOLIC BLOOD PRESSURE: 131 MMHG

## 2025-06-26 DIAGNOSIS — E11.9 TYPE 2 DIABETES MELLITUS WITHOUT COMPLICATION, WITH LONG-TERM CURRENT USE OF INSULIN: ICD-10-CM

## 2025-06-26 DIAGNOSIS — Z79.4 TYPE 2 DIABETES MELLITUS WITHOUT COMPLICATION, WITH LONG-TERM CURRENT USE OF INSULIN: ICD-10-CM

## 2025-06-26 LAB — POC HEMOGLOBIN A1C: 7 % (ref 4.2–6.5)

## 2025-06-26 PROCEDURE — 83036 HEMOGLOBIN GLYCOSYLATED A1C: CPT | Performed by: INTERNAL MEDICINE

## 2025-06-26 PROCEDURE — 1159F MED LIST DOCD IN RCRD: CPT | Performed by: INTERNAL MEDICINE

## 2025-06-26 PROCEDURE — 3078F DIAST BP <80 MM HG: CPT | Performed by: INTERNAL MEDICINE

## 2025-06-26 PROCEDURE — 3075F SYST BP GE 130 - 139MM HG: CPT | Performed by: INTERNAL MEDICINE

## 2025-06-26 PROCEDURE — G2211 COMPLEX E/M VISIT ADD ON: HCPCS | Performed by: INTERNAL MEDICINE

## 2025-06-26 PROCEDURE — 1160F RVW MEDS BY RX/DR IN RCRD: CPT | Performed by: INTERNAL MEDICINE

## 2025-06-26 PROCEDURE — 95251 CONT GLUC MNTR ANALYSIS I&R: CPT | Performed by: INTERNAL MEDICINE

## 2025-06-26 PROCEDURE — 99214 OFFICE O/P EST MOD 30 MIN: CPT | Performed by: INTERNAL MEDICINE

## 2025-06-26 RX ORDER — INSULIN LISPRO 100 [IU]/ML
8-10 INJECTION, SOLUTION INTRAVENOUS; SUBCUTANEOUS
Qty: 30 ML | Refills: 3 | Status: SHIPPED | OUTPATIENT
Start: 2025-06-26

## 2025-06-26 RX ORDER — PEN NEEDLE, DIABETIC 29 G X1/2"
NEEDLE, DISPOSABLE MISCELLANEOUS
Qty: 100 EACH | Refills: 11 | Status: SHIPPED | OUTPATIENT
Start: 2025-06-26 | End: 2026-06-26

## 2025-06-26 ASSESSMENT — ENCOUNTER SYMPTOMS
FEVER: 0
BACK PAIN: 1
SHORTNESS OF BREATH: 0
POLYDIPSIA: 0
DIARRHEA: 0
FREQUENCY: 0
NECK PAIN: 1
ARTHRALGIAS: 0
APPETITE CHANGE: 0
CONSTIPATION: 0
NAUSEA: 0
HEADACHES: 0
VOMITING: 0
ABDOMINAL PAIN: 0
SORE THROAT: 0
COUGH: 0
NERVOUS/ANXIOUS: 0

## 2025-06-26 ASSESSMENT — COLUMBIA-SUICIDE SEVERITY RATING SCALE - C-SSRS
1. IN THE PAST MONTH, HAVE YOU WISHED YOU WERE DEAD OR WISHED YOU COULD GO TO SLEEP AND NOT WAKE UP?: NO
6. HAVE YOU EVER DONE ANYTHING, STARTED TO DO ANYTHING, OR PREPARED TO DO ANYTHING TO END YOUR LIFE?: NO
2. HAVE YOU ACTUALLY HAD ANY THOUGHTS OF KILLING YOURSELF?: NO

## 2025-06-26 NOTE — PROGRESS NOTES
History Of Present Illness  Michael Szymanski is a 79 y.o. male     Duration of type 2 diabetes mellitus:  25 years  Complications:  Chronic kidney disease     Interval right hemicolectomy for colon cancer.   No other treatment required.      Lantus 60 units at bedtime     Humalog still taking after meals, 8-10 units  He has difficulty choosing a dose before eating     DexCom G7  Patient is testing glucose 1440 times daily   Records reviewed, on file     Last eye exam:  8/6/24    Past Medical History  He has a past medical history of Acute pansinusitis, unspecified (08/06/2019), Benign prostatic hyperplasia without lower urinary tract symptoms, Cellulitis of left lower limb (10/08/2021), Chronic kidney disease, stage 4 (severe) (Multi) (04/09/2025), Class 2 obesity with body mass index (BMI) of 37.0 to 37.9 in adult (02/27/2025), Encounter for immunization (03/21/2017), Encounter for screening for colorectal cancer in high risk patient, Hyperlipidemia, unspecified (12/10/2015), Hypertensive chronic kidney disease with stage 1 through stage 4 chronic kidney disease, or unspecified chronic kidney disease (12/09/2021), Klinefelter syndrome, unspecified ([facility]), Localized edema (09/29/2021), Low back pain, unspecified (04/25/2018), Malignant neoplasm of ascending colon (Multi), Morbid (severe) obesity due to excess calories (Multi) (05/14/2020), Morbid (severe) obesity due to excess calories (Multi) (12/11/2021), Obesity, unspecified (08/07/2019), Other abnormal glucose, Other conditions influencing health status (03/12/2022), Other conditions influencing health status (03/12/2022), Other specified abnormal findings of blood chemistry (02/13/2020), Other specified symptoms and signs involving the circulatory and respiratory systems (12/31/2018), Other symptoms and signs involving the musculoskeletal system (04/25/2018), Other tear of medial meniscus, current injury, unspecified knee, initial encounter, Personal history  of diseases of the skin and subcutaneous tissue (08/07/2019), Personal history of other diseases of male genital organs, Personal history of other diseases of the circulatory system, Personal history of other diseases of the musculoskeletal system and connective tissue, Personal history of other diseases of the musculoskeletal system and connective tissue, Personal history of other diseases of the nervous system and sense organs, Personal history of other diseases of the respiratory system (06/25/2018), Personal history of other diseases of the respiratory system, Personal history of other diseases of urinary system (04/19/2017), Personal history of other endocrine, nutritional and metabolic disease, Personal history of other endocrine, nutritional and metabolic disease, Personal history of other endocrine, nutritional and metabolic disease, Personal history of other medical treatment, Personal history of other specified conditions (05/14/2020), Personal history of other specified conditions (10/21/2021), Personal history of other specified conditions (06/19/2013), Personal history of urinary (tract) infections (01/13/2022), Preoperative clearance (04/09/2025), Proteinuria, unspecified, Spondylosis without myelopathy or radiculopathy, cervical region, Sprain of ribs, initial encounter (09/09/2021), Sprain of ribs, initial encounter (09/09/2021), Strain of muscle, fascia and tendon of lower back, subsequent encounter (09/10/2019), Type 2 diabetes mellitus with mild nonproliferative diabetic retinopathy without macular edema, bilateral (12/09/2021), and 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    Surgical History  He has a past surgical history that includes Tonsillectomy (05/29/2013); Gallbladder surgery (06/19/2013); Prostatectomy (06/19/2013); Cataract extraction (06/19/2013); Colonoscopy (02/06/2017); Knee surgery (02/06/2017); Esophagogastroduodenoscopy  "(02/06/2017); Cystoscopy (02/06/2017); Cholecystectomy (02/06/2017); Knee surgery (02/06/2017); and Colonoscopy (02/27/2025).     Social History  He reports that he has never smoked. He has never been exposed to tobacco smoke. He has never used smokeless tobacco. He reports that he does not drink alcohol and does not use drugs.    Family History  Family History[1]    Medications  Current Outpatient Medications   Medication Instructions    acetaminophen (TYLENOL) 650 mg, oral, Every 6 hours    amLODIPine (NORVASC) 10 mg, oral, Every 24 hours    aspirin 81 mg chewable tablet 1 tablet, Daily    calcitriol (Rocaltrol) 0.5 mcg capsule 1 capsule, Every 24 hours    Dexcom G4 platinum  (Dexcom G7 ) misc For continuous glucose monitoring.    Dexcom G7 Sensor device For continuous glucose monitoring.  Change every 10 days.    FreeStyle Lite Strips strip For glucose testing 3 times daily    HumaLOG U-100 Insulin 6-8 Units, subcutaneous, 3 times daily after meals, Take as directed per insulin instructions.    Lantus U-100 Insulin 60 Units, subcutaneous, Nightly    metoprolol succinate XL (TOPROL-XL) 25 mg, oral, Daily    omega 3-dha-epa-fish oil (Fish OiL) 1,200 (144-216) mg capsule Fish Oil 1200 MG Oral Capsule  Refills: 0  Start: 6-May-2021    omega 3-dha-epa-fish oil 360 mg-108 mg- 180 mg-1,200 mg capsule 1 capsule, Daily    pen needle, diabetic (Novofine 32) 32 gauge x 1/4\" needle 3-4 times daily    pravastatin (Pravachol) 20 mg tablet TAKE ONE TABLET BY MOUTH ONCE EVERY 24 HOURS.    sodium bicarbonate 1,300 mg, 2 times daily    testosterone (ANDROGEL) 5 g, transdermal, Daily    torsemide (Demadex) 20 mg tablet 1 tablet, Daily       Allergies  Cortisone and Penicillins    Review of Systems   Constitutional:  Negative for appetite change and fever.   HENT:  Positive for dental problem. Negative for sore throat.         Denies dry mouth   Eyes:  Negative for visual disturbance.   Respiratory:  Negative for " cough and shortness of breath.    Cardiovascular:  Negative for chest pain.   Gastrointestinal:  Negative for abdominal pain, constipation, diarrhea, nausea and vomiting.   Endocrine: Negative for polydipsia and polyuria.   Genitourinary:  Negative for frequency.        Erectile dysfunction   Musculoskeletal:  Positive for back pain and neck pain. Negative for arthralgias.   Skin:  Negative for rash.   Neurological:  Negative for headaches.   Psychiatric/Behavioral:  The patient is not nervous/anxious.          Last Recorded Vitals  Blood pressure 131/74, pulse 54.    Physical Exam  Constitutional:       General: He is not in acute distress.  HENT:      Head: Normocephalic.      Mouth/Throat:      Mouth: Mucous membranes are moist.   Eyes:      Extraocular Movements: Extraocular movements intact.   Neck:      Thyroid: No thyroid mass or thyromegaly.   Cardiovascular:      Pulses:           Radial pulses are 2+ on the right side and 2+ on the left side.   Lymphadenopathy:      Cervical: No cervical adenopathy.   Neurological:      Mental Status: He is alert.      Motor: No tremor.   Psychiatric:         Mood and Affect: Affect normal.          Relevant Results  Glucose (mg/dL)   Date Value   05/02/2025 324 (H)   04/17/2025 76   11/19/2024 79     POC HEMOGLOBIN A1c (%)   Date Value   06/26/2025 7.0 (A)   03/19/2025 7.2 (A)   12/04/2024 7.9 (A)     Bicarbonate (mmol/L)   Date Value   05/02/2025 21   04/17/2025 26   11/19/2024 23     UREA NITROGEN (BUN) (mg/dL)   Date Value   04/08/2025 55 (H)     Urea Nitrogen (mg/dL)   Date Value   05/02/2025 44 (H)   04/17/2025 43 (H)   11/19/2024 43 (H)     Creatinine (mg/dL)   Date Value   05/02/2025 3.11 (H)   04/17/2025 3.12 (H)   11/19/2024 3.36 (H)     CREATININE (mg/dL)   Date Value   04/08/2025 2.84 (H)     Lab Results   Component Value Date    CHOL 156 06/13/2025    CHOL 88 02/26/2024    CHOL 161 01/03/2023     Lab Results   Component Value Date    HDL 43 06/13/2025    HDL  34.4 02/26/2024    HDL 40.8 01/03/2023     Lab Results   Component Value Date    LDLCALC 87 06/13/2025    LDLCALC 28 02/26/2024     Lab Results   Component Value Date    TRIG 162 (H) 06/13/2025    TRIG 126 02/26/2024    TRIG 160 (H) 01/03/2023       CGM INTERPRETATION:  Patient is adhering to and benefitting from continuous glucose monitoring.   Model: DexCom G7  Period reviewed:  14 days  Testing glucose 1440 times daily    Average blood sugar 180 mg/dL, glucose management indicator 7.6%    Glucose less than 70 mg/dL equals 0% of time worn  Glucose ranging between 70 to 180 mg/dL represented by 58% of time worn  Glucose ranging greater than 180 mg/dL represented by 42% of time worn    >72 hours of data reviewed in order to inform diabetes treatment plan decision making, patient currently at risk for recurrent hypoglycemia safety concerns      IMPRESSION  TYPE 2 DIABETES MELLITUS  LONG TERM CURRENT INSULIN USE  Complicated by CKD  Rapid A1c 7%  Glucose well controlled with marked decrease in frequency of hypoglycemia      RECOMMENDATIONS  Continue current program  Follow up 3-4 months           [1]   Family History  Problem Relation Name Age of Onset    Pancreatic cancer Mother      Diabetes Mother      Heart disease Mother      Kidney disease Father      Hearing loss Brother      Other (elevated psa) Brother          serum    Other (cardiac disorder) Maternal Grandmother      Other (cardiac disorder) Maternal Grandfather      Other (cardiac disorder) Paternal Grandmother      Other (cardiac disorder) Paternal Grandfather

## 2025-06-26 NOTE — LETTER
July 6, 2025     Robinson Ackerman DO  [id number] [facility]  [address]  [facility] [address]    Patient: Michael Szymanski   YOB: 1945   Date of Visit: 6/26/2025       Dear Dr. Robinson Ackerman DO:    Thank you for referring Michael Szymanski to me for evaluation. Below are my notes for this consultation.  If you have questions, please do not hesitate to call me. I look forward to following your patient along with you.       Sincerely,     Tye Brizuela MD      CC: No Recipients  ______________________________________________________________________________________    History Of Present Illness  Michael Szymanski is a 79 y.o. male     Duration of type 2 diabetes mellitus:  25 years  Complications:  Chronic kidney disease     Interval right hemicolectomy for colon cancer.   No other treatment required.      Lantus 60 units at bedtime     Humalog still taking after meals, 8-10 units  He has difficulty choosing a dose before eating     DexCom G7  Patient is testing glucose 1440 times daily   Records reviewed, on file     Last eye exam:  8/6/24    Past Medical History  He has a past medical history of Acute pansinusitis, unspecified (08/06/2019), Benign prostatic hyperplasia without lower urinary tract symptoms, Cellulitis of left lower limb (10/08/2021), Chronic kidney disease, stage 4 (severe) (Multi) (04/09/2025), Class 2 obesity with body mass index (BMI) of 37.0 to 37.9 in adult (02/27/2025), Encounter for immunization (03/21/2017), Encounter for screening for colorectal cancer in high risk patient, Hyperlipidemia, unspecified (12/10/2015), Hypertensive chronic kidney disease with stage 1 through stage 4 chronic kidney disease, or unspecified chronic kidney disease (12/09/2021), Klinefelter syndrome, unspecified ([facility]), Localized edema (09/29/2021), Low back pain, unspecified (04/25/2018), Malignant neoplasm of ascending colon (Multi), Morbid (severe) obesity due to excess calories (Multi) (05/14/2020), Morbid (severe)  obesity due to excess calories (Multi) (12/11/2021), Obesity, unspecified (08/07/2019), Other abnormal glucose, Other conditions influencing health status (03/12/2022), Other conditions influencing health status (03/12/2022), Other specified abnormal findings of blood chemistry (02/13/2020), Other specified symptoms and signs involving the circulatory and respiratory systems (12/31/2018), Other symptoms and signs involving the musculoskeletal system (04/25/2018), Other tear of medial meniscus, current injury, unspecified knee, initial encounter, Personal history of diseases of the skin and subcutaneous tissue (08/07/2019), Personal history of other diseases of male genital organs, Personal history of other diseases of the circulatory system, Personal history of other diseases of the musculoskeletal system and connective tissue, Personal history of other diseases of the musculoskeletal system and connective tissue, Personal history of other diseases of the nervous system and sense organs, Personal history of other diseases of the respiratory system (06/25/2018), Personal history of other diseases of the respiratory system, Personal history of other diseases of urinary system (04/19/2017), Personal history of other endocrine, nutritional and metabolic disease, Personal history of other endocrine, nutritional and metabolic disease, Personal history of other endocrine, nutritional and metabolic disease, Personal history of other medical treatment, Personal history of other specified conditions (05/14/2020), Personal history of other specified conditions (10/21/2021), Personal history of other specified conditions (06/19/2013), Personal history of urinary (tract) infections (01/13/2022), Preoperative clearance (04/09/2025), Proteinuria, unspecified, Spondylosis without myelopathy or radiculopathy, cervical region, Sprain of ribs, initial encounter (09/09/2021), Sprain of ribs, initial encounter (09/09/2021), Strain of  muscle, fascia and tendon of lower back, subsequent encounter (09/10/2019), Type 2 diabetes mellitus with mild nonproliferative diabetic retinopathy without macular edema, bilateral (12/09/2021), and 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    Surgical History  He has a past surgical history that includes Tonsillectomy (05/29/2013); Gallbladder surgery (06/19/2013); Prostatectomy (06/19/2013); Cataract extraction (06/19/2013); Colonoscopy (02/06/2017); Knee surgery (02/06/2017); Esophagogastroduodenoscopy (02/06/2017); Cystoscopy (02/06/2017); Cholecystectomy (02/06/2017); Knee surgery (02/06/2017); and Colonoscopy (02/27/2025).     Social History  He reports that he has never smoked. He has never been exposed to tobacco smoke. He has never used smokeless tobacco. He reports that he does not drink alcohol and does not use drugs.    Family History  Family History[1]    Medications  Current Outpatient Medications   Medication Instructions   • acetaminophen (TYLENOL) 650 mg, oral, Every 6 hours   • amLODIPine (NORVASC) 10 mg, oral, Every 24 hours   • aspirin 81 mg chewable tablet 1 tablet, Daily   • calcitriol (Rocaltrol) 0.5 mcg capsule 1 capsule, Every 24 hours   • Dexcom G4 platinum  (Dexcom G7 ) misc For continuous glucose monitoring.   • Dexcom G7 Sensor device For continuous glucose monitoring.  Change every 10 days.   • FreeStyle Lite Strips strip For glucose testing 3 times daily   • HumaLOG U-100 Insulin 6-8 Units, subcutaneous, 3 times daily after meals, Take as directed per insulin instructions.   • Lantus U-100 Insulin 60 Units, subcutaneous, Nightly   • metoprolol succinate XL (TOPROL-XL) 25 mg, oral, Daily   • omega 3-dha-epa-fish oil (Fish OiL) 1,200 (144-216) mg capsule Fish Oil 1200 MG Oral Capsule  Refills: 0  Start: 6-May-2021   • omega 3-dha-epa-fish oil 360 mg-108 mg- 180 mg-1,200 mg capsule 1 capsule, Daily   • pen needle,  "diabetic (Novofine 32) 32 gauge x 1/4\" needle 3-4 times daily   • pravastatin (Pravachol) 20 mg tablet TAKE ONE TABLET BY MOUTH ONCE EVERY 24 HOURS.   • sodium bicarbonate 1,300 mg, 2 times daily   • testosterone (ANDROGEL) 5 g, transdermal, Daily   • torsemide (Demadex) 20 mg tablet 1 tablet, Daily       Allergies  Cortisone and Penicillins    Review of Systems   Constitutional:  Negative for appetite change and fever.   HENT:  Positive for dental problem. Negative for sore throat.         Denies dry mouth   Eyes:  Negative for visual disturbance.   Respiratory:  Negative for cough and shortness of breath.    Cardiovascular:  Negative for chest pain.   Gastrointestinal:  Negative for abdominal pain, constipation, diarrhea, nausea and vomiting.   Endocrine: Negative for polydipsia and polyuria.   Genitourinary:  Negative for frequency.        Erectile dysfunction   Musculoskeletal:  Positive for back pain and neck pain. Negative for arthralgias.   Skin:  Negative for rash.   Neurological:  Negative for headaches.   Psychiatric/Behavioral:  The patient is not nervous/anxious.          Last Recorded Vitals  Blood pressure 131/74, pulse 54.    Physical Exam  Constitutional:       General: He is not in acute distress.  HENT:      Head: Normocephalic.      Mouth/Throat:      Mouth: Mucous membranes are moist.   Eyes:      Extraocular Movements: Extraocular movements intact.   Neck:      Thyroid: No thyroid mass or thyromegaly.   Cardiovascular:      Pulses:           Radial pulses are 2+ on the right side and 2+ on the left side.   Lymphadenopathy:      Cervical: No cervical adenopathy.   Neurological:      Mental Status: He is alert.      Motor: No tremor.   Psychiatric:         Mood and Affect: Affect normal.          Relevant Results  Glucose (mg/dL)   Date Value   05/02/2025 324 (H)   04/17/2025 76   11/19/2024 79     POC HEMOGLOBIN A1c (%)   Date Value   06/26/2025 7.0 (A)   03/19/2025 7.2 (A)   12/04/2024 7.9 (A) "     Bicarbonate (mmol/L)   Date Value   05/02/2025 21   04/17/2025 26   11/19/2024 23     UREA NITROGEN (BUN) (mg/dL)   Date Value   04/08/2025 55 (H)     Urea Nitrogen (mg/dL)   Date Value   05/02/2025 44 (H)   04/17/2025 43 (H)   11/19/2024 43 (H)     Creatinine (mg/dL)   Date Value   05/02/2025 3.11 (H)   04/17/2025 3.12 (H)   11/19/2024 3.36 (H)     CREATININE (mg/dL)   Date Value   04/08/2025 2.84 (H)     Lab Results   Component Value Date    CHOL 156 06/13/2025    CHOL 88 02/26/2024    CHOL 161 01/03/2023     Lab Results   Component Value Date    HDL 43 06/13/2025    HDL 34.4 02/26/2024    HDL 40.8 01/03/2023     Lab Results   Component Value Date    LDLCALC 87 06/13/2025    LDLCALC 28 02/26/2024     Lab Results   Component Value Date    TRIG 162 (H) 06/13/2025    TRIG 126 02/26/2024    TRIG 160 (H) 01/03/2023       CGM INTERPRETATION:  Patient is adhering to and benefitting from continuous glucose monitoring.   Model: DexCom G7  Period reviewed:  14 days  Testing glucose 1440 times daily    Average blood sugar 180 mg/dL, glucose management indicator 7.6%    Glucose less than 70 mg/dL equals 0% of time worn  Glucose ranging between 70 to 180 mg/dL represented by 58% of time worn  Glucose ranging greater than 180 mg/dL represented by 42% of time worn    >72 hours of data reviewed in order to inform diabetes treatment plan decision making, patient currently at risk for recurrent hypoglycemia safety concerns      IMPRESSION  TYPE 2 DIABETES MELLITUS  LONG TERM CURRENT INSULIN USE  Complicated by CKD  Rapid A1c 7%  Glucose well controlled with marked decrease in frequency of hypoglycemia      RECOMMENDATIONS  Continue current program  Follow up 3-4 months              [1]  Family History  Problem Relation Name Age of Onset   • Pancreatic cancer Mother     • Diabetes Mother     • Heart disease Mother     • Kidney disease Father     • Hearing loss Brother     • Other (elevated psa) Brother          serum   • Other  (cardiac disorder) Maternal Grandmother     • Other (cardiac disorder) Maternal Grandfather     • Other (cardiac disorder) Paternal Grandmother     • Other (cardiac disorder) Paternal Grandfather          [1]  Family History  Problem Relation Name Age of Onset   • Pancreatic cancer Mother     • Diabetes Mother     • Heart disease Mother     • Kidney disease Father     • Hearing loss Brother     • Other (elevated psa) Brother          serum   • Other (cardiac disorder) Maternal Grandmother     • Other (cardiac disorder) Maternal Grandfather     • Other (cardiac disorder) Paternal Grandmother     • Other (cardiac disorder) Paternal Grandfather

## 2025-10-08 ENCOUNTER — APPOINTMENT (OUTPATIENT)
Dept: ENDOCRINOLOGY | Facility: CLINIC | Age: 80
End: 2025-10-08
Payer: MEDICARE

## 2025-12-10 ENCOUNTER — APPOINTMENT (OUTPATIENT)
Dept: PRIMARY CARE | Facility: CLINIC | Age: 80
End: 2025-12-10
Payer: MEDICARE

## (undated) DEVICE — DRAPE, INSTRUMENT, W/POUCH, STERI DRAPE, 9 5/8 X 18 LONG

## (undated) DEVICE — ACCESS SYS, KII SHIELDED BLADED, Z-THREAD, 5X100CM

## (undated) DEVICE — STAPLER, LINEAR ENDO RELOAD, 60MM, BLUE, DISP

## (undated) DEVICE — SCISSOR, MINI ENDO CUT, TIPS, DISP

## (undated) DEVICE — CAUTERY, PENCIL, PUSH BUTTON, SMOKE EVAC, 70MM

## (undated) DEVICE — SUTURE, PROLENE, 0, 30 IN, CT1, BLUE

## (undated) DEVICE — CUTTER, PROX LINEAR, 75MM, REG TISSUE, W/ SAFETY LOCK OUT

## (undated) DEVICE — ENDO, CLIP, 5MM, M/L

## (undated) DEVICE — STAPLER, ECHELON FLEX GST, POWERED PLUS, 60MM X 34CM, STANDARD

## (undated) DEVICE — TUBE SET, PNEUMOCLEAR, SMOKE EVACU, HIGH-FLOW

## (undated) DEVICE — KIT, MINOR, DOUBLE BASIN

## (undated) DEVICE — ASSEMBLY, STRYKER FLOW 2, SUCTION IRRIGATOR, WITH TIP

## (undated) DEVICE — CARE KIT, LAPAROSCOPIC, ADVANCED

## (undated) DEVICE — CABLE, ELECTROSURGICAL, MONOPOLAR, LAPAROSCOPIC, 10 FT, LF

## (undated) DEVICE — DRAPE PACK, LAPAROSCOPIC CHOLECYSTECTOMY, CUSTOM, GEAUGA

## (undated) DEVICE — SUTURE, VICRYL, 2-0, 27 IN, BR/SH 27, VIOLET

## (undated) DEVICE — SUTURE, VICRYL, 4-0, 18 IN, PS2, UNDYED

## (undated) DEVICE — SUTURE, VICRYL, 3-0, 27 IN, SH, VIOLET

## (undated) DEVICE — LIGASURE, V SEALER/DIVIDER  5MM BLUNT TIP

## (undated) DEVICE — NEEDLE, SAFETY, 21 G X 1.5 IN

## (undated) DEVICE — Device

## (undated) DEVICE — TIP, SUCTION, YANKAUER, BULB, ADULT

## (undated) DEVICE — TROCAR SYSTEM, BALLOON, KII GELPORT, 12 X 100MM

## (undated) DEVICE — COVER HANDLE LIGHT, STERIS, BLUE, STERILE

## (undated) DEVICE — NEEDLE, SAFETY, 22 G X 1.5 IN

## (undated) DEVICE — APPLICATOR, CHLORAPREP, W/ORANGE TINT, 26ML

## (undated) DEVICE — SUTURE, VICRYL, 3-0, 27 IN, CT-2, UNDYED

## (undated) DEVICE — ELECTRODE, ELECTROSURGICAL, BLADE, INSULATED, ENT/IMA, STERILE

## (undated) DEVICE — SUTURE, VICRYL, 0, 27 IN, UR-6, VIOLET

## (undated) DEVICE — CLOSURE SYSTEM, DERMABOND, PRINEO, 22CM, STERILE

## (undated) DEVICE — CLIP, ENDO, CLINCH II, W/RATCHET, ON/OFF, CLINCH II, 5 MM

## (undated) DEVICE — SUTURE, PDS II, 0, 27 IN, CT-2, VIOLET

## (undated) DEVICE — CUTTER, PROXIMATE LINEAR RELOAD, 75MM, BLUE

## (undated) DEVICE — STAPLER, LINEAR, 3.5 60MM, RELOADABLE, BLUE

## (undated) DEVICE — TOWEL PACK, STERILE, 4/PACK, BLUE

## (undated) DEVICE — TRAY, FOLEY, URINE METER, SURESTEP, 16FR, W/STATLOCK

## (undated) DEVICE — GOWN, ASTOUND, L

## (undated) DEVICE — HANDPIECE, POOLE SUCTION, W/O TUBING